# Patient Record
Sex: FEMALE | Race: BLACK OR AFRICAN AMERICAN | NOT HISPANIC OR LATINO | Employment: OTHER | URBAN - METROPOLITAN AREA
[De-identification: names, ages, dates, MRNs, and addresses within clinical notes are randomized per-mention and may not be internally consistent; named-entity substitution may affect disease eponyms.]

---

## 2019-02-15 ENCOUNTER — LAB REQUISITION (OUTPATIENT)
Dept: LAB | Facility: HOSPITAL | Age: 74
End: 2019-02-15

## 2019-02-15 DIAGNOSIS — Z00.00 ROUTINE GENERAL MEDICAL EXAMINATION AT A HEALTH CARE FACILITY: ICD-10-CM

## 2019-02-15 LAB
AMORPH URATE CRY URNS QL MICRO: ABNORMAL /HPF
ANION GAP SERPL CALCULATED.3IONS-SCNC: 2 MMOL/L (ref 3–11)
BACTERIA UR QL AUTO: ABNORMAL /HPF
BASOPHILS # BLD AUTO: 0.02 10*3/MM3 (ref 0–0.2)
BASOPHILS NFR BLD AUTO: 0.2 % (ref 0–1)
BILIRUB UR QL STRIP: NEGATIVE
BUN BLD-MCNC: 16 MG/DL (ref 9–23)
BUN/CREAT SERPL: 18.2 (ref 7–25)
CALCIUM SPEC-SCNC: 9.3 MG/DL (ref 8.7–10.4)
CHLORIDE SERPL-SCNC: 104 MMOL/L (ref 99–109)
CLARITY UR: ABNORMAL
CO2 SERPL-SCNC: 31 MMOL/L (ref 20–31)
COLOR UR: YELLOW
CREAT BLD-MCNC: 0.88 MG/DL (ref 0.6–1.3)
DEPRECATED RDW RBC AUTO: 56.9 FL (ref 37–54)
EOSINOPHIL # BLD AUTO: 0.5 10*3/MM3 (ref 0–0.3)
EOSINOPHIL NFR BLD AUTO: 5.1 % (ref 0–3)
ERYTHROCYTE [DISTWIDTH] IN BLOOD BY AUTOMATED COUNT: 15.7 % (ref 11.3–14.5)
GFR SERPL CREATININE-BSD FRML MDRD: 62 ML/MIN/1.73
GFR SERPL CREATININE-BSD FRML MDRD: 76 ML/MIN/1.73
GLUCOSE BLD-MCNC: 87 MG/DL (ref 70–100)
GLUCOSE UR STRIP-MCNC: NEGATIVE MG/DL
HCT VFR BLD AUTO: 40.8 % (ref 34.5–44)
HGB BLD-MCNC: 12.8 G/DL (ref 11.5–15.5)
HGB UR QL STRIP.AUTO: ABNORMAL
HYALINE CASTS UR QL AUTO: ABNORMAL /LPF
IMM GRANULOCYTES # BLD AUTO: 0.07 10*3/MM3 (ref 0–0.05)
IMM GRANULOCYTES NFR BLD AUTO: 0.7 % (ref 0–0.6)
KETONES UR QL STRIP: NEGATIVE
LEUKOCYTE ESTERASE UR QL STRIP.AUTO: ABNORMAL
LYMPHOCYTES # BLD AUTO: 3.31 10*3/MM3 (ref 0.6–4.8)
LYMPHOCYTES NFR BLD AUTO: 33.8 % (ref 24–44)
MCH RBC QN AUTO: 30.9 PG (ref 27–31)
MCHC RBC AUTO-ENTMCNC: 31.4 G/DL (ref 32–36)
MCV RBC AUTO: 98.6 FL (ref 80–99)
MONOCYTES # BLD AUTO: 0.74 10*3/MM3 (ref 0–1)
MONOCYTES NFR BLD AUTO: 7.6 % (ref 0–12)
NEUTROPHILS # BLD AUTO: 5.22 10*3/MM3 (ref 1.5–8.3)
NEUTROPHILS NFR BLD AUTO: 53.3 % (ref 41–71)
NITRITE UR QL STRIP: POSITIVE
PH UR STRIP.AUTO: <=5 [PH] (ref 5–8)
PLATELET # BLD AUTO: 209 10*3/MM3 (ref 150–450)
PMV BLD AUTO: 11.1 FL (ref 6–12)
POTASSIUM BLD-SCNC: 4.9 MMOL/L (ref 3.5–5.5)
PROT UR QL STRIP: NEGATIVE
RBC # BLD AUTO: 4.14 10*6/MM3 (ref 3.89–5.14)
RBC # UR: ABNORMAL /HPF
REF LAB TEST METHOD: ABNORMAL
SODIUM BLD-SCNC: 137 MMOL/L (ref 132–146)
SP GR UR STRIP: 1.02 (ref 1–1.03)
SQUAMOUS #/AREA URNS HPF: ABNORMAL /HPF
UROBILINOGEN UR QL STRIP: ABNORMAL
WBC NRBC COR # BLD: 9.79 10*3/MM3 (ref 3.5–10.8)
WBC UR QL AUTO: ABNORMAL /HPF

## 2019-02-15 PROCEDURE — 81001 URINALYSIS AUTO W/SCOPE: CPT

## 2019-02-15 PROCEDURE — 85025 COMPLETE CBC W/AUTO DIFF WBC: CPT

## 2019-02-15 PROCEDURE — 80048 BASIC METABOLIC PNL TOTAL CA: CPT

## 2019-02-17 ENCOUNTER — LAB REQUISITION (OUTPATIENT)
Dept: LAB | Facility: HOSPITAL | Age: 74
End: 2019-02-17

## 2019-02-17 DIAGNOSIS — Z00.00 ROUTINE GENERAL MEDICAL EXAMINATION AT A HEALTH CARE FACILITY: ICD-10-CM

## 2019-02-17 LAB
BACTERIA UR QL AUTO: ABNORMAL /HPF
BILIRUB UR QL STRIP: NEGATIVE
CLARITY UR: ABNORMAL
COLOR UR: YELLOW
GLUCOSE UR STRIP-MCNC: NEGATIVE MG/DL
HGB UR QL STRIP.AUTO: ABNORMAL
HYALINE CASTS UR QL AUTO: ABNORMAL /LPF
KETONES UR QL STRIP: NEGATIVE
LEUKOCYTE ESTERASE UR QL STRIP.AUTO: ABNORMAL
MUCOUS THREADS URNS QL MICRO: ABNORMAL /HPF
NITRITE UR QL STRIP: NEGATIVE
PH UR STRIP.AUTO: <=5 [PH] (ref 5–8)
PROT UR QL STRIP: NEGATIVE
RBC # UR: ABNORMAL /HPF
REF LAB TEST METHOD: ABNORMAL
SP GR UR STRIP: 1.02 (ref 1–1.03)
SQUAMOUS #/AREA URNS HPF: ABNORMAL /HPF
UROBILINOGEN UR QL STRIP: ABNORMAL
WBC CLUMPS # UR AUTO: ABNORMAL /HPF
WBC UR QL AUTO: ABNORMAL /HPF

## 2019-02-17 PROCEDURE — 81001 URINALYSIS AUTO W/SCOPE: CPT

## 2020-03-15 ENCOUNTER — LAB REQUISITION (OUTPATIENT)
Dept: LAB | Facility: HOSPITAL | Age: 75
End: 2020-03-15

## 2020-03-15 DIAGNOSIS — Z00.00 ROUTINE GENERAL MEDICAL EXAMINATION AT A HEALTH CARE FACILITY: ICD-10-CM

## 2020-03-15 LAB
ANION GAP SERPL CALCULATED.3IONS-SCNC: 10 MMOL/L (ref 5–15)
BASOPHILS # BLD AUTO: 0.04 10*3/MM3 (ref 0–0.2)
BASOPHILS NFR BLD AUTO: 0.2 % (ref 0–1.5)
BUN BLD-MCNC: 12 MG/DL (ref 8–23)
BUN/CREAT SERPL: 16.7 (ref 7–25)
CALCIUM SPEC-SCNC: 8.8 MG/DL (ref 8.6–10.5)
CHLORIDE SERPL-SCNC: 102 MMOL/L (ref 98–107)
CO2 SERPL-SCNC: 29 MMOL/L (ref 22–29)
CREAT BLD-MCNC: 0.72 MG/DL (ref 0.57–1)
DEPRECATED RDW RBC AUTO: 58.5 FL (ref 37–54)
EOSINOPHIL # BLD AUTO: 0.31 10*3/MM3 (ref 0–0.4)
EOSINOPHIL NFR BLD AUTO: 1.9 % (ref 0.3–6.2)
ERYTHROCYTE [DISTWIDTH] IN BLOOD BY AUTOMATED COUNT: 16.5 % (ref 12.3–15.4)
GFR SERPL CREATININE-BSD FRML MDRD: 79 ML/MIN/1.73
GFR SERPL CREATININE-BSD FRML MDRD: 95 ML/MIN/1.73
GLUCOSE BLD-MCNC: 64 MG/DL (ref 65–99)
HCT VFR BLD AUTO: 39 % (ref 34–46.6)
HGB BLD-MCNC: 12.3 G/DL (ref 12–15.9)
IMM GRANULOCYTES # BLD AUTO: 0.08 10*3/MM3 (ref 0–0.05)
IMM GRANULOCYTES NFR BLD AUTO: 0.5 % (ref 0–0.5)
LYMPHOCYTES # BLD AUTO: 4.06 10*3/MM3 (ref 0.7–3.1)
LYMPHOCYTES NFR BLD AUTO: 24.8 % (ref 19.6–45.3)
MCH RBC QN AUTO: 30.3 PG (ref 26.6–33)
MCHC RBC AUTO-ENTMCNC: 31.5 G/DL (ref 31.5–35.7)
MCV RBC AUTO: 96.1 FL (ref 79–97)
MONOCYTES # BLD AUTO: 0.97 10*3/MM3 (ref 0.1–0.9)
MONOCYTES NFR BLD AUTO: 5.9 % (ref 5–12)
NEUTROPHILS # BLD AUTO: 10.92 10*3/MM3 (ref 1.7–7)
NEUTROPHILS NFR BLD AUTO: 66.7 % (ref 42.7–76)
NRBC BLD AUTO-RTO: 0 /100 WBC (ref 0–0.2)
PLATELET # BLD AUTO: 200 10*3/MM3 (ref 140–450)
PMV BLD AUTO: 10.3 FL (ref 6–12)
POTASSIUM BLD-SCNC: 4.1 MMOL/L (ref 3.5–5.2)
RBC # BLD AUTO: 4.06 10*6/MM3 (ref 3.77–5.28)
SODIUM BLD-SCNC: 141 MMOL/L (ref 136–145)
WBC NRBC COR # BLD: 16.38 10*3/MM3 (ref 3.4–10.8)

## 2020-03-15 PROCEDURE — 85025 COMPLETE CBC W/AUTO DIFF WBC: CPT

## 2020-03-15 PROCEDURE — 80048 BASIC METABOLIC PNL TOTAL CA: CPT

## 2022-04-03 ENCOUNTER — HOSPITAL ENCOUNTER (EMERGENCY)
Facility: HOSPITAL | Age: 77
Discharge: NURSING, REHAB OR LONG-TERM CARE | End: 2022-04-04
Attending: EMERGENCY MEDICINE | Admitting: EMERGENCY MEDICINE

## 2022-04-03 ENCOUNTER — APPOINTMENT (OUTPATIENT)
Dept: GENERAL RADIOLOGY | Facility: HOSPITAL | Age: 77
End: 2022-04-03

## 2022-04-03 DIAGNOSIS — R50.9 FEVER AND CHILLS: Primary | ICD-10-CM

## 2022-04-03 PROCEDURE — 71045 X-RAY EXAM CHEST 1 VIEW: CPT

## 2022-04-03 PROCEDURE — 99285 EMERGENCY DEPT VISIT HI MDM: CPT

## 2022-04-03 PROCEDURE — 93005 ELECTROCARDIOGRAM TRACING: CPT | Performed by: EMERGENCY MEDICINE

## 2022-04-03 RX ORDER — SODIUM CHLORIDE 0.9 % (FLUSH) 0.9 %
10 SYRINGE (ML) INJECTION AS NEEDED
Status: DISCONTINUED | OUTPATIENT
Start: 2022-04-03 | End: 2022-04-04 | Stop reason: HOSPADM

## 2022-04-04 VITALS
TEMPERATURE: 99.1 F | WEIGHT: 200 LBS | SYSTOLIC BLOOD PRESSURE: 112 MMHG | DIASTOLIC BLOOD PRESSURE: 65 MMHG | HEIGHT: 65 IN | BODY MASS INDEX: 33.32 KG/M2 | HEART RATE: 63 BPM | RESPIRATION RATE: 16 BRPM | OXYGEN SATURATION: 99 %

## 2022-04-04 LAB
ALBUMIN SERPL-MCNC: 2.4 G/DL (ref 3.5–5.2)
ALBUMIN/GLOB SERPL: 0.5 G/DL
ALP SERPL-CCNC: 81 U/L (ref 39–117)
ALT SERPL W P-5'-P-CCNC: 6 U/L (ref 1–33)
ANION GAP SERPL CALCULATED.3IONS-SCNC: 8 MMOL/L (ref 5–15)
AST SERPL-CCNC: 16 U/L (ref 1–32)
BASOPHILS # BLD AUTO: 0.04 10*3/MM3 (ref 0–0.2)
BASOPHILS NFR BLD AUTO: 0.4 % (ref 0–1.5)
BILIRUB SERPL-MCNC: 0.3 MG/DL (ref 0–1.2)
BUN SERPL-MCNC: 10 MG/DL (ref 8–23)
BUN/CREAT SERPL: 11 (ref 7–25)
CALCIUM SPEC-SCNC: 8.4 MG/DL (ref 8.6–10.5)
CHLORIDE SERPL-SCNC: 103 MMOL/L (ref 98–107)
CLUMPED PLATELETS: PRESENT
CO2 SERPL-SCNC: 27 MMOL/L (ref 22–29)
CREAT SERPL-MCNC: 0.91 MG/DL (ref 0.57–1)
D-LACTATE SERPL-SCNC: 1.9 MMOL/L (ref 0.5–2)
DEPRECATED RDW RBC AUTO: 59.9 FL (ref 37–54)
EGFRCR SERPLBLD CKD-EPI 2021: 64.3 ML/MIN/1.73
EOSINOPHIL # BLD AUTO: 0.13 10*3/MM3 (ref 0–0.4)
EOSINOPHIL NFR BLD AUTO: 1.4 % (ref 0.3–6.2)
ERYTHROCYTE [DISTWIDTH] IN BLOOD BY AUTOMATED COUNT: 17.9 % (ref 12.3–15.4)
FLUAV RNA RESP QL NAA+PROBE: NOT DETECTED
FLUBV RNA RESP QL NAA+PROBE: NOT DETECTED
GLOBULIN UR ELPH-MCNC: 4.5 GM/DL
GLUCOSE SERPL-MCNC: 95 MG/DL (ref 65–99)
HCT VFR BLD AUTO: 29.9 % (ref 34–46.6)
HGB BLD-MCNC: 9 G/DL (ref 12–15.9)
HOLD SPECIMEN: NORMAL
IMM GRANULOCYTES # BLD AUTO: 0.05 10*3/MM3 (ref 0–0.05)
IMM GRANULOCYTES NFR BLD AUTO: 0.6 % (ref 0–0.5)
LYMPHOCYTES # BLD AUTO: 1.93 10*3/MM3 (ref 0.7–3.1)
LYMPHOCYTES NFR BLD AUTO: 21.3 % (ref 19.6–45.3)
MAGNESIUM SERPL-MCNC: 1.8 MG/DL (ref 1.6–2.4)
MCH RBC QN AUTO: 27.4 PG (ref 26.6–33)
MCHC RBC AUTO-ENTMCNC: 30.1 G/DL (ref 31.5–35.7)
MCV RBC AUTO: 91.2 FL (ref 79–97)
MONOCYTES # BLD AUTO: 1.02 10*3/MM3 (ref 0.1–0.9)
MONOCYTES NFR BLD AUTO: 11.2 % (ref 5–12)
NEUTROPHILS NFR BLD AUTO: 5.9 10*3/MM3 (ref 1.7–7)
NEUTROPHILS NFR BLD AUTO: 65.1 % (ref 42.7–76)
NRBC BLD AUTO-RTO: 0 /100 WBC (ref 0–0.2)
PLATELET # BLD AUTO: 240 10*3/MM3 (ref 140–450)
PMV BLD AUTO: 9.7 FL (ref 6–12)
POTASSIUM SERPL-SCNC: 4.8 MMOL/L (ref 3.5–5.2)
PROT SERPL-MCNC: 6.9 G/DL (ref 6–8.5)
RBC # BLD AUTO: 3.28 10*6/MM3 (ref 3.77–5.28)
RBC MORPH BLD: NORMAL
SARS-COV-2 RNA RESP QL NAA+PROBE: NOT DETECTED
SODIUM SERPL-SCNC: 138 MMOL/L (ref 136–145)
TROPONIN T SERPL-MCNC: 0.03 NG/ML (ref 0–0.03)
WBC MORPH BLD: NORMAL
WBC NRBC COR # BLD: 9.07 10*3/MM3 (ref 3.4–10.8)
WHOLE BLOOD HOLD SPECIMEN: NORMAL
WHOLE BLOOD HOLD SPECIMEN: NORMAL

## 2022-04-04 PROCEDURE — 36415 COLL VENOUS BLD VENIPUNCTURE: CPT

## 2022-04-04 PROCEDURE — 87147 CULTURE TYPE IMMUNOLOGIC: CPT | Performed by: EMERGENCY MEDICINE

## 2022-04-04 PROCEDURE — 87185 SC STD ENZYME DETCJ PER NZM: CPT | Performed by: EMERGENCY MEDICINE

## 2022-04-04 PROCEDURE — 87040 BLOOD CULTURE FOR BACTERIA: CPT | Performed by: EMERGENCY MEDICINE

## 2022-04-04 PROCEDURE — 87150 DNA/RNA AMPLIFIED PROBE: CPT | Performed by: EMERGENCY MEDICINE

## 2022-04-04 PROCEDURE — 85025 COMPLETE CBC W/AUTO DIFF WBC: CPT | Performed by: EMERGENCY MEDICINE

## 2022-04-04 PROCEDURE — 84484 ASSAY OF TROPONIN QUANT: CPT | Performed by: EMERGENCY MEDICINE

## 2022-04-04 PROCEDURE — 85007 BL SMEAR W/DIFF WBC COUNT: CPT | Performed by: EMERGENCY MEDICINE

## 2022-04-04 PROCEDURE — 83735 ASSAY OF MAGNESIUM: CPT | Performed by: EMERGENCY MEDICINE

## 2022-04-04 PROCEDURE — 87077 CULTURE AEROBIC IDENTIFY: CPT | Performed by: EMERGENCY MEDICINE

## 2022-04-04 PROCEDURE — 80053 COMPREHEN METABOLIC PANEL: CPT | Performed by: EMERGENCY MEDICINE

## 2022-04-04 PROCEDURE — 83605 ASSAY OF LACTIC ACID: CPT | Performed by: EMERGENCY MEDICINE

## 2022-04-04 PROCEDURE — 96365 THER/PROPH/DIAG IV INF INIT: CPT

## 2022-04-04 PROCEDURE — 87636 SARSCOV2 & INF A&B AMP PRB: CPT | Performed by: EMERGENCY MEDICINE

## 2022-04-04 PROCEDURE — 25010000002 CEFTRIAXONE PER 250 MG: Performed by: EMERGENCY MEDICINE

## 2022-04-04 PROCEDURE — 87186 SC STD MICRODIL/AGAR DIL: CPT | Performed by: EMERGENCY MEDICINE

## 2022-04-04 PROCEDURE — 87076 CULTURE ANAEROBE IDENT EACH: CPT | Performed by: EMERGENCY MEDICINE

## 2022-04-04 RX ORDER — ACETAMINOPHEN 500 MG
1000 TABLET ORAL ONCE
Status: DISCONTINUED | OUTPATIENT
Start: 2022-04-04 | End: 2022-04-04

## 2022-04-04 RX ORDER — ACETAMINOPHEN 650 MG/1
650 SUPPOSITORY RECTAL ONCE
Status: COMPLETED | OUTPATIENT
Start: 2022-04-04 | End: 2022-04-04

## 2022-04-04 RX ORDER — CEFDINIR 250 MG/5ML
250 POWDER, FOR SUSPENSION ORAL 2 TIMES DAILY
Qty: 70 ML | Refills: 0 | Status: SHIPPED | OUTPATIENT
Start: 2022-04-04 | End: 2022-04-08 | Stop reason: HOSPADM

## 2022-04-04 RX ADMIN — SODIUM CHLORIDE 1000 ML: 9 INJECTION, SOLUTION INTRAVENOUS at 01:41

## 2022-04-04 RX ADMIN — ACETAMINOPHEN 650 MG: 650 SUPPOSITORY RECTAL at 00:49

## 2022-04-04 RX ADMIN — SODIUM CHLORIDE 2 G: 900 INJECTION INTRAVENOUS at 01:41

## 2022-04-05 ENCOUNTER — HOSPITAL ENCOUNTER (INPATIENT)
Facility: HOSPITAL | Age: 77
LOS: 2 days | Discharge: NURSING, REHAB OR LONG-TERM CARE | End: 2022-04-08
Attending: EMERGENCY MEDICINE | Admitting: INTERNAL MEDICINE

## 2022-04-05 ENCOUNTER — APPOINTMENT (OUTPATIENT)
Dept: CT IMAGING | Facility: HOSPITAL | Age: 77
End: 2022-04-05

## 2022-04-05 ENCOUNTER — APPOINTMENT (OUTPATIENT)
Dept: GENERAL RADIOLOGY | Facility: HOSPITAL | Age: 77
End: 2022-04-05

## 2022-04-05 ENCOUNTER — TELEPHONE (OUTPATIENT)
Dept: EMERGENCY DEPT | Facility: HOSPITAL | Age: 77
End: 2022-04-05

## 2022-04-05 DIAGNOSIS — R78.81 POSITIVE BLOOD CULTURE: Primary | ICD-10-CM

## 2022-04-05 DIAGNOSIS — N30.90 CYSTITIS: ICD-10-CM

## 2022-04-05 DIAGNOSIS — R13.11 ORAL PHASE DYSPHAGIA: ICD-10-CM

## 2022-04-05 DIAGNOSIS — R93.89 ABNORMAL CHEST X-RAY: ICD-10-CM

## 2022-04-05 PROBLEM — E78.5 HYPERLIPIDEMIA: Status: ACTIVE | Noted: 2022-04-05

## 2022-04-05 PROBLEM — I10 ESSENTIAL HYPERTENSION: Status: ACTIVE | Noted: 2022-04-05

## 2022-04-05 PROBLEM — D64.9 ANEMIA: Status: ACTIVE | Noted: 2022-04-05

## 2022-04-05 PROBLEM — F03.90 DEMENTIA (HCC): Status: ACTIVE | Noted: 2022-04-05

## 2022-04-05 PROBLEM — N39.0 ACUTE UTI (URINARY TRACT INFECTION): Status: ACTIVE | Noted: 2022-04-05

## 2022-04-05 PROBLEM — E83.51 HYPOCALCEMIA: Status: ACTIVE | Noted: 2022-04-05

## 2022-04-05 PROBLEM — S91.302A WOUND OF LEFT FOOT: Status: ACTIVE | Noted: 2022-04-05

## 2022-04-05 PROBLEM — J18.9 CAP (COMMUNITY ACQUIRED PNEUMONIA): Status: ACTIVE | Noted: 2022-04-05

## 2022-04-05 PROBLEM — E03.9 HYPOTHYROIDISM: Status: ACTIVE | Noted: 2022-04-05

## 2022-04-05 LAB
ALBUMIN SERPL-MCNC: 2.5 G/DL (ref 3.5–5.2)
ALBUMIN/GLOB SERPL: 0.6 G/DL
ALP SERPL-CCNC: 79 U/L (ref 39–117)
ALT SERPL W P-5'-P-CCNC: 5 U/L (ref 1–33)
ANION GAP SERPL CALCULATED.3IONS-SCNC: 6 MMOL/L (ref 5–15)
AST SERPL-CCNC: 13 U/L (ref 1–32)
BACTERIA BLD CULT: ABNORMAL
BACTERIA UR QL AUTO: ABNORMAL /HPF
BASOPHILS # BLD AUTO: 0.03 10*3/MM3 (ref 0–0.2)
BASOPHILS NFR BLD AUTO: 0.4 % (ref 0–1.5)
BILIRUB SERPL-MCNC: 0.2 MG/DL (ref 0–1.2)
BILIRUB UR QL STRIP: NEGATIVE
BUN SERPL-MCNC: 11 MG/DL (ref 8–23)
BUN/CREAT SERPL: 11.6 (ref 7–25)
CALCIUM SPEC-SCNC: 8.1 MG/DL (ref 8.6–10.5)
CHLORIDE SERPL-SCNC: 101 MMOL/L (ref 98–107)
CLARITY UR: ABNORMAL
CO2 SERPL-SCNC: 28 MMOL/L (ref 22–29)
COLOR UR: YELLOW
CREAT SERPL-MCNC: 0.95 MG/DL (ref 0.57–1)
D-LACTATE SERPL-SCNC: 1.6 MMOL/L (ref 0.5–2)
DEPRECATED RDW RBC AUTO: 61.4 FL (ref 37–54)
EGFRCR SERPLBLD CKD-EPI 2021: 61.1 ML/MIN/1.73
EOSINOPHIL # BLD AUTO: 0.73 10*3/MM3 (ref 0–0.4)
EOSINOPHIL NFR BLD AUTO: 10.9 % (ref 0.3–6.2)
ERYTHROCYTE [DISTWIDTH] IN BLOOD BY AUTOMATED COUNT: 17.7 % (ref 12.3–15.4)
FLUAV SUBTYP SPEC NAA+PROBE: NOT DETECTED
FLUBV RNA ISLT QL NAA+PROBE: NOT DETECTED
GLOBULIN UR ELPH-MCNC: 4.4 GM/DL
GLUCOSE SERPL-MCNC: 108 MG/DL (ref 65–99)
GLUCOSE UR STRIP-MCNC: NEGATIVE MG/DL
HCT VFR BLD AUTO: 29.2 % (ref 34–46.6)
HGB BLD-MCNC: 8.7 G/DL (ref 12–15.9)
HGB UR QL STRIP.AUTO: ABNORMAL
HOLD SPECIMEN: NORMAL
HYALINE CASTS UR QL AUTO: ABNORMAL /LPF
IMM GRANULOCYTES # BLD AUTO: 0.02 10*3/MM3 (ref 0–0.05)
IMM GRANULOCYTES NFR BLD AUTO: 0.3 % (ref 0–0.5)
INR PPP: 1.14 (ref 0.84–1.13)
KETONES UR QL STRIP: NEGATIVE
LEUKOCYTE ESTERASE UR QL STRIP.AUTO: ABNORMAL
LYMPHOCYTES # BLD AUTO: 2.98 10*3/MM3 (ref 0.7–3.1)
LYMPHOCYTES NFR BLD AUTO: 44.7 % (ref 19.6–45.3)
MCH RBC QN AUTO: 27.9 PG (ref 26.6–33)
MCHC RBC AUTO-ENTMCNC: 29.8 G/DL (ref 31.5–35.7)
MCV RBC AUTO: 93.6 FL (ref 79–97)
MONOCYTES # BLD AUTO: 0.7 10*3/MM3 (ref 0.1–0.9)
MONOCYTES NFR BLD AUTO: 10.5 % (ref 5–12)
NEUTROPHILS NFR BLD AUTO: 2.21 10*3/MM3 (ref 1.7–7)
NEUTROPHILS NFR BLD AUTO: 33.2 % (ref 42.7–76)
NITRITE UR QL STRIP: NEGATIVE
NRBC BLD AUTO-RTO: 0 /100 WBC (ref 0–0.2)
PH UR STRIP.AUTO: 5.5 [PH] (ref 5–8)
PLATELET # BLD AUTO: 212 10*3/MM3 (ref 140–450)
PMV BLD AUTO: 9.2 FL (ref 6–12)
POTASSIUM SERPL-SCNC: 4.2 MMOL/L (ref 3.5–5.2)
PROCALCITONIN SERPL-MCNC: <0.2 NG/ML (ref 0–0.25)
PROT SERPL-MCNC: 6.9 G/DL (ref 6–8.5)
PROT UR QL STRIP: ABNORMAL
PROTHROMBIN TIME: 14.6 SECONDS (ref 11.4–14.4)
RBC # BLD AUTO: 3.12 10*6/MM3 (ref 3.77–5.28)
RBC # UR STRIP: ABNORMAL /HPF
REF LAB TEST METHOD: ABNORMAL
SARS-COV-2 RNA PNL SPEC NAA+PROBE: NOT DETECTED
SODIUM SERPL-SCNC: 135 MMOL/L (ref 136–145)
SP GR UR STRIP: 1.02 (ref 1–1.03)
SQUAMOUS #/AREA URNS HPF: ABNORMAL /HPF
UROBILINOGEN UR QL STRIP: ABNORMAL
WBC # UR STRIP: ABNORMAL /HPF
WBC NRBC COR # BLD: 6.67 10*3/MM3 (ref 3.4–10.8)
WHOLE BLOOD HOLD SPECIMEN: NORMAL
WHOLE BLOOD HOLD SPECIMEN: NORMAL

## 2022-04-05 PROCEDURE — 99284 EMERGENCY DEPT VISIT MOD MDM: CPT

## 2022-04-05 PROCEDURE — 82728 ASSAY OF FERRITIN: CPT | Performed by: PHYSICIAN ASSISTANT

## 2022-04-05 PROCEDURE — 25010000002 CEFTRIAXONE PER 250 MG: Performed by: EMERGENCY MEDICINE

## 2022-04-05 PROCEDURE — 81001 URINALYSIS AUTO W/SCOPE: CPT | Performed by: EMERGENCY MEDICINE

## 2022-04-05 PROCEDURE — 85045 AUTOMATED RETICULOCYTE COUNT: CPT | Performed by: PHYSICIAN ASSISTANT

## 2022-04-05 PROCEDURE — 99223 1ST HOSP IP/OBS HIGH 75: CPT | Performed by: INTERNAL MEDICINE

## 2022-04-05 PROCEDURE — 83605 ASSAY OF LACTIC ACID: CPT | Performed by: EMERGENCY MEDICINE

## 2022-04-05 PROCEDURE — 85610 PROTHROMBIN TIME: CPT | Performed by: EMERGENCY MEDICINE

## 2022-04-05 PROCEDURE — G0378 HOSPITAL OBSERVATION PER HR: HCPCS

## 2022-04-05 PROCEDURE — 71260 CT THORAX DX C+: CPT

## 2022-04-05 PROCEDURE — 84439 ASSAY OF FREE THYROXINE: CPT | Performed by: PHYSICIAN ASSISTANT

## 2022-04-05 PROCEDURE — 87040 BLOOD CULTURE FOR BACTERIA: CPT | Performed by: EMERGENCY MEDICINE

## 2022-04-05 PROCEDURE — 84466 ASSAY OF TRANSFERRIN: CPT | Performed by: PHYSICIAN ASSISTANT

## 2022-04-05 PROCEDURE — 74177 CT ABD & PELVIS W/CONTRAST: CPT

## 2022-04-05 PROCEDURE — 25010000002 VANCOMYCIN 10 G RECONSTITUTED SOLUTION: Performed by: EMERGENCY MEDICINE

## 2022-04-05 PROCEDURE — 82607 VITAMIN B-12: CPT | Performed by: PHYSICIAN ASSISTANT

## 2022-04-05 PROCEDURE — 82746 ASSAY OF FOLIC ACID SERUM: CPT | Performed by: PHYSICIAN ASSISTANT

## 2022-04-05 PROCEDURE — 85025 COMPLETE CBC W/AUTO DIFF WBC: CPT | Performed by: EMERGENCY MEDICINE

## 2022-04-05 PROCEDURE — 80053 COMPREHEN METABOLIC PANEL: CPT | Performed by: EMERGENCY MEDICINE

## 2022-04-05 PROCEDURE — 25010000002 IOPAMIDOL 61 % SOLUTION: Performed by: INTERNAL MEDICINE

## 2022-04-05 PROCEDURE — 87636 SARSCOV2 & INF A&B AMP PRB: CPT | Performed by: EMERGENCY MEDICINE

## 2022-04-05 PROCEDURE — 84443 ASSAY THYROID STIM HORMONE: CPT | Performed by: PHYSICIAN ASSISTANT

## 2022-04-05 PROCEDURE — P9612 CATHETERIZE FOR URINE SPEC: HCPCS

## 2022-04-05 PROCEDURE — 83036 HEMOGLOBIN GLYCOSYLATED A1C: CPT | Performed by: PHYSICIAN ASSISTANT

## 2022-04-05 PROCEDURE — 71045 X-RAY EXAM CHEST 1 VIEW: CPT

## 2022-04-05 PROCEDURE — 83970 ASSAY OF PARATHORMONE: CPT | Performed by: PHYSICIAN ASSISTANT

## 2022-04-05 PROCEDURE — 84145 PROCALCITONIN (PCT): CPT | Performed by: EMERGENCY MEDICINE

## 2022-04-05 PROCEDURE — 83540 ASSAY OF IRON: CPT | Performed by: PHYSICIAN ASSISTANT

## 2022-04-05 PROCEDURE — 87086 URINE CULTURE/COLONY COUNT: CPT | Performed by: EMERGENCY MEDICINE

## 2022-04-05 RX ORDER — ACETAMINOPHEN 500 MG
500 TABLET ORAL EVERY 6 HOURS PRN
COMMUNITY

## 2022-04-05 RX ORDER — ASPIRIN 81 MG/1
81 TABLET, CHEWABLE ORAL EVERY MORNING
COMMUNITY

## 2022-04-05 RX ORDER — ERGOCALCIFEROL (VITAMIN D2) 200 MCG/ML
DROPS ORAL DAILY
COMMUNITY
End: 2022-07-17

## 2022-04-05 RX ORDER — MIRTAZAPINE 7.5 MG/1
7.5 TABLET, FILM COATED ORAL NIGHTLY
COMMUNITY

## 2022-04-05 RX ORDER — DOCUSATE SODIUM 100 MG/1
100 CAPSULE, LIQUID FILLED ORAL 2 TIMES DAILY
COMMUNITY

## 2022-04-05 RX ORDER — RISPERIDONE 0.25 MG/1
0.25 TABLET ORAL 2 TIMES DAILY
COMMUNITY

## 2022-04-05 RX ORDER — SULFAMETHOXAZOLE AND TRIMETHOPRIM 800; 160 MG/1; MG/1
TABLET ORAL 2 TIMES DAILY
COMMUNITY
End: 2022-04-08 | Stop reason: HOSPADM

## 2022-04-05 RX ORDER — ATORVASTATIN CALCIUM 40 MG/1
40 TABLET, FILM COATED ORAL EVERY MORNING
COMMUNITY

## 2022-04-05 RX ORDER — LEVOTHYROXINE SODIUM 0.05 MG/1
50 TABLET ORAL DAILY
COMMUNITY

## 2022-04-05 RX ORDER — NYSTATIN 100000 U/G
1 CREAM TOPICAL 2 TIMES DAILY
COMMUNITY
End: 2022-07-17

## 2022-04-05 RX ORDER — METOPROLOL TARTRATE 50 MG/1
50 TABLET, FILM COATED ORAL 2 TIMES DAILY
COMMUNITY

## 2022-04-05 RX ORDER — TRAMADOL HYDROCHLORIDE 50 MG/1
50 TABLET ORAL EVERY 6 HOURS PRN
COMMUNITY

## 2022-04-05 RX ORDER — SODIUM CHLORIDE 0.9 % (FLUSH) 0.9 %
10 SYRINGE (ML) INJECTION AS NEEDED
Status: DISCONTINUED | OUTPATIENT
Start: 2022-04-05 | End: 2022-04-08 | Stop reason: HOSPADM

## 2022-04-05 RX ADMIN — SODIUM CHLORIDE 2 G: 900 INJECTION INTRAVENOUS at 19:27

## 2022-04-05 RX ADMIN — VANCOMYCIN HYDROCHLORIDE 1750 MG: 10 INJECTION, POWDER, LYOPHILIZED, FOR SOLUTION INTRAVENOUS at 20:32

## 2022-04-05 RX ADMIN — IOPAMIDOL 100 ML: 612 INJECTION, SOLUTION INTRAVENOUS at 22:18

## 2022-04-05 RX ADMIN — SODIUM CHLORIDE 500 ML: 9 INJECTION, SOLUTION INTRAVENOUS at 19:27

## 2022-04-05 NOTE — TELEPHONE ENCOUNTER
I spoke to the nurse Yakelin at [facility] and she advised me the patient is on Augmentin for a UTI and she does not have a fever or cough.  I did advise her of the positive blood culture results.  She will take this information of pass this along to the attending physician there at [facility] otherwise she advised me the patient is doing pretty well but she is aware of my concern for sepsis bacteremia and will pass this information along to the attending physician in charge of the patient.

## 2022-04-06 ENCOUNTER — APPOINTMENT (OUTPATIENT)
Dept: GENERAL RADIOLOGY | Facility: HOSPITAL | Age: 77
End: 2022-04-06

## 2022-04-06 LAB
ANION GAP SERPL CALCULATED.3IONS-SCNC: 8 MMOL/L (ref 5–15)
BACTERIA SPEC AEROBE CULT: NO GROWTH
BASOPHILS # BLD AUTO: 0.04 10*3/MM3 (ref 0–0.2)
BASOPHILS NFR BLD AUTO: 0.6 % (ref 0–1.5)
BUN SERPL-MCNC: 9 MG/DL (ref 8–23)
BUN/CREAT SERPL: 10.8 (ref 7–25)
CA-I SERPL ISE-MCNC: 1.23 MMOL/L (ref 1.12–1.32)
CALCIUM SPEC-SCNC: 8.1 MG/DL (ref 8.6–10.5)
CHLORIDE SERPL-SCNC: 102 MMOL/L (ref 98–107)
CO2 SERPL-SCNC: 24 MMOL/L (ref 22–29)
CREAT SERPL-MCNC: 0.83 MG/DL (ref 0.57–1)
DEPRECATED RDW RBC AUTO: 61.7 FL (ref 37–54)
EGFRCR SERPLBLD CKD-EPI 2021: 71.8 ML/MIN/1.73
EOSINOPHIL # BLD AUTO: 0.47 10*3/MM3 (ref 0–0.4)
EOSINOPHIL NFR BLD AUTO: 7.4 % (ref 0.3–6.2)
ERYTHROCYTE [DISTWIDTH] IN BLOOD BY AUTOMATED COUNT: 17.8 % (ref 12.3–15.4)
FERRITIN SERPL-MCNC: 232 NG/ML (ref 13–150)
FOLATE SERPL-MCNC: 3.23 NG/ML (ref 4.78–24.2)
GLUCOSE SERPL-MCNC: 85 MG/DL (ref 65–99)
HBA1C MFR BLD: 4.9 % (ref 4.8–5.6)
HCT VFR BLD AUTO: 32.1 % (ref 34–46.6)
HGB BLD-MCNC: 9.4 G/DL (ref 12–15.9)
IMM GRANULOCYTES # BLD AUTO: 0.03 10*3/MM3 (ref 0–0.05)
IMM GRANULOCYTES NFR BLD AUTO: 0.5 % (ref 0–0.5)
IRON 24H UR-MRATE: 19 MCG/DL (ref 37–145)
IRON SATN MFR SERPL: 10 % (ref 20–50)
LYMPHOCYTES # BLD AUTO: 2.22 10*3/MM3 (ref 0.7–3.1)
LYMPHOCYTES NFR BLD AUTO: 35 % (ref 19.6–45.3)
MCH RBC QN AUTO: 27.6 PG (ref 26.6–33)
MCHC RBC AUTO-ENTMCNC: 29.3 G/DL (ref 31.5–35.7)
MCV RBC AUTO: 94.1 FL (ref 79–97)
MONOCYTES # BLD AUTO: 0.65 10*3/MM3 (ref 0.1–0.9)
MONOCYTES NFR BLD AUTO: 10.2 % (ref 5–12)
MRSA DNA SPEC QL NAA+PROBE: NEGATIVE
NEUTROPHILS NFR BLD AUTO: 2.94 10*3/MM3 (ref 1.7–7)
NEUTROPHILS NFR BLD AUTO: 46.3 % (ref 42.7–76)
NRBC BLD AUTO-RTO: 0 /100 WBC (ref 0–0.2)
PLATELET # BLD AUTO: 213 10*3/MM3 (ref 140–450)
PMV BLD AUTO: 9 FL (ref 6–12)
POTASSIUM SERPL-SCNC: 4.2 MMOL/L (ref 3.5–5.2)
PTH-INTACT SERPL-MCNC: 36 PG/ML (ref 15–65)
RBC # BLD AUTO: 3.41 10*6/MM3 (ref 3.77–5.28)
RETICS # AUTO: 0.03 10*6/MM3 (ref 0.02–0.13)
RETICS/RBC NFR AUTO: 1.02 % (ref 0.7–1.9)
SODIUM SERPL-SCNC: 134 MMOL/L (ref 136–145)
T4 FREE SERPL-MCNC: 1.06 NG/DL (ref 0.93–1.7)
TIBC SERPL-MCNC: 192 MCG/DL (ref 298–536)
TRANSFERRIN SERPL-MCNC: 129 MG/DL (ref 200–360)
TSH SERPL DL<=0.05 MIU/L-ACNC: 3.15 UIU/ML (ref 0.27–4.2)
TSH SERPL DL<=0.05 MIU/L-ACNC: 4.16 UIU/ML (ref 0.27–4.2)
VIT B12 BLD-MCNC: 565 PG/ML (ref 211–946)
WBC NRBC COR # BLD: 6.35 10*3/MM3 (ref 3.4–10.8)

## 2022-04-06 PROCEDURE — 80048 BASIC METABOLIC PNL TOTAL CA: CPT | Performed by: PHYSICIAN ASSISTANT

## 2022-04-06 PROCEDURE — 82330 ASSAY OF CALCIUM: CPT | Performed by: PHYSICIAN ASSISTANT

## 2022-04-06 PROCEDURE — 25010000002 PIPERACILLIN SOD-TAZOBACTAM PER 1 G: Performed by: PHYSICIAN ASSISTANT

## 2022-04-06 PROCEDURE — 92610 EVALUATE SWALLOWING FUNCTION: CPT

## 2022-04-06 PROCEDURE — 99233 SBSQ HOSP IP/OBS HIGH 50: CPT | Performed by: INTERNAL MEDICINE

## 2022-04-06 PROCEDURE — 85025 COMPLETE CBC W/AUTO DIFF WBC: CPT | Performed by: PHYSICIAN ASSISTANT

## 2022-04-06 PROCEDURE — 74230 X-RAY XM SWLNG FUNCJ C+: CPT

## 2022-04-06 PROCEDURE — 92611 MOTION FLUOROSCOPY/SWALLOW: CPT

## 2022-04-06 PROCEDURE — 87641 MR-STAPH DNA AMP PROBE: CPT

## 2022-04-06 PROCEDURE — 84443 ASSAY THYROID STIM HORMONE: CPT | Performed by: INTERNAL MEDICINE

## 2022-04-06 RX ORDER — POLYETHYLENE GLYCOL 3350 17 G/17G
17 POWDER, FOR SOLUTION ORAL DAILY PRN
Status: DISCONTINUED | OUTPATIENT
Start: 2022-04-06 | End: 2022-04-08 | Stop reason: HOSPADM

## 2022-04-06 RX ORDER — MIRTAZAPINE 15 MG/1
7.5 TABLET, FILM COATED ORAL NIGHTLY
Status: DISCONTINUED | OUTPATIENT
Start: 2022-04-06 | End: 2022-04-08 | Stop reason: HOSPADM

## 2022-04-06 RX ORDER — ACETAMINOPHEN 500 MG
500 TABLET ORAL EVERY 6 HOURS PRN
Status: DISCONTINUED | OUTPATIENT
Start: 2022-04-06 | End: 2022-04-08 | Stop reason: HOSPADM

## 2022-04-06 RX ORDER — BISACODYL 5 MG/1
5 TABLET, DELAYED RELEASE ORAL DAILY PRN
Status: DISCONTINUED | OUTPATIENT
Start: 2022-04-06 | End: 2022-04-08 | Stop reason: HOSPADM

## 2022-04-06 RX ORDER — ATORVASTATIN CALCIUM 40 MG/1
40 TABLET, FILM COATED ORAL DAILY
Status: DISCONTINUED | OUTPATIENT
Start: 2022-04-06 | End: 2022-04-08 | Stop reason: HOSPADM

## 2022-04-06 RX ORDER — AMOXICILLIN 250 MG
2 CAPSULE ORAL 2 TIMES DAILY
Status: DISCONTINUED | OUTPATIENT
Start: 2022-04-06 | End: 2022-04-08 | Stop reason: HOSPADM

## 2022-04-06 RX ORDER — METOPROLOL TARTRATE 50 MG/1
50 TABLET, FILM COATED ORAL 2 TIMES DAILY
Status: DISCONTINUED | OUTPATIENT
Start: 2022-04-06 | End: 2022-04-07

## 2022-04-06 RX ORDER — ONDANSETRON 2 MG/ML
4 INJECTION INTRAMUSCULAR; INTRAVENOUS EVERY 6 HOURS PRN
Status: DISCONTINUED | OUTPATIENT
Start: 2022-04-06 | End: 2022-04-08 | Stop reason: HOSPADM

## 2022-04-06 RX ORDER — RISPERIDONE 0.25 MG/1
0.25 TABLET ORAL 2 TIMES DAILY
Status: DISCONTINUED | OUTPATIENT
Start: 2022-04-06 | End: 2022-04-08 | Stop reason: HOSPADM

## 2022-04-06 RX ORDER — BISACODYL 10 MG
10 SUPPOSITORY, RECTAL RECTAL DAILY PRN
Status: DISCONTINUED | OUTPATIENT
Start: 2022-04-06 | End: 2022-04-08 | Stop reason: HOSPADM

## 2022-04-06 RX ORDER — CHOLECALCIFEROL (VITAMIN D3) 125 MCG
5 CAPSULE ORAL NIGHTLY PRN
Status: DISCONTINUED | OUTPATIENT
Start: 2022-04-06 | End: 2022-04-08 | Stop reason: HOSPADM

## 2022-04-06 RX ORDER — TRAMADOL HYDROCHLORIDE 50 MG/1
50 TABLET ORAL EVERY 6 HOURS PRN
Status: DISCONTINUED | OUTPATIENT
Start: 2022-04-06 | End: 2022-04-08 | Stop reason: HOSPADM

## 2022-04-06 RX ORDER — SODIUM CHLORIDE 9 MG/ML
50 INJECTION, SOLUTION INTRAVENOUS CONTINUOUS
Status: DISCONTINUED | OUTPATIENT
Start: 2022-04-06 | End: 2022-04-08 | Stop reason: HOSPADM

## 2022-04-06 RX ORDER — ASPIRIN 81 MG/1
81 TABLET, CHEWABLE ORAL DAILY
Status: DISCONTINUED | OUTPATIENT
Start: 2022-04-06 | End: 2022-04-08 | Stop reason: HOSPADM

## 2022-04-06 RX ORDER — LEVOTHYROXINE SODIUM 0.05 MG/1
50 TABLET ORAL
Status: DISCONTINUED | OUTPATIENT
Start: 2022-04-06 | End: 2022-04-08 | Stop reason: HOSPADM

## 2022-04-06 RX ORDER — SODIUM CHLORIDE 0.9 % (FLUSH) 0.9 %
10 SYRINGE (ML) INJECTION EVERY 12 HOURS SCHEDULED
Status: DISCONTINUED | OUTPATIENT
Start: 2022-04-06 | End: 2022-04-08 | Stop reason: HOSPADM

## 2022-04-06 RX ORDER — SODIUM CHLORIDE 0.9 % (FLUSH) 0.9 %
10 SYRINGE (ML) INJECTION AS NEEDED
Status: DISCONTINUED | OUTPATIENT
Start: 2022-04-06 | End: 2022-04-08 | Stop reason: HOSPADM

## 2022-04-06 RX ADMIN — SENNOSIDES AND DOCUSATE SODIUM 2 TABLET: 50; 8.6 TABLET ORAL at 21:31

## 2022-04-06 RX ADMIN — ATORVASTATIN CALCIUM 40 MG: 40 TABLET, FILM COATED ORAL at 09:28

## 2022-04-06 RX ADMIN — MIRTAZAPINE 7.5 MG: 15 TABLET, FILM COATED ORAL at 22:22

## 2022-04-06 RX ADMIN — RISPERIDONE 0.25 MG: 0.25 TABLET ORAL at 21:31

## 2022-04-06 RX ADMIN — Medication 10 ML: at 21:32

## 2022-04-06 RX ADMIN — TAZOBACTAM SODIUM AND PIPERACILLIN SODIUM 3.38 G: 375; 3 INJECTION, SOLUTION INTRAVENOUS at 15:05

## 2022-04-06 RX ADMIN — BARIUM SULFATE 20 ML: 400 PASTE ORAL at 14:01

## 2022-04-06 RX ADMIN — Medication 10 ML: at 13:22

## 2022-04-06 RX ADMIN — METOPROLOL TARTRATE 50 MG: 50 TABLET, FILM COATED ORAL at 09:28

## 2022-04-06 RX ADMIN — MIRTAZAPINE 7.5 MG: 15 TABLET, FILM COATED ORAL at 01:20

## 2022-04-06 RX ADMIN — TAZOBACTAM SODIUM AND PIPERACILLIN SODIUM 3.38 G: 375; 3 INJECTION, SOLUTION INTRAVENOUS at 01:21

## 2022-04-06 RX ADMIN — RISPERIDONE 0.25 MG: 0.25 TABLET ORAL at 09:28

## 2022-04-06 RX ADMIN — SODIUM CHLORIDE 50 ML/HR: 9 INJECTION, SOLUTION INTRAVENOUS at 01:21

## 2022-04-06 RX ADMIN — Medication 10 ML: at 01:21

## 2022-04-06 RX ADMIN — BARIUM SULFATE 100 ML: 0.81 POWDER, FOR SUSPENSION ORAL at 14:01

## 2022-04-06 RX ADMIN — ASPIRIN 81 MG 81 MG: 81 TABLET ORAL at 09:28

## 2022-04-06 RX ADMIN — TAZOBACTAM SODIUM AND PIPERACILLIN SODIUM 3.38 G: 375; 3 INJECTION, SOLUTION INTRAVENOUS at 06:07

## 2022-04-06 RX ADMIN — TAZOBACTAM SODIUM AND PIPERACILLIN SODIUM 3.38 G: 375; 3 INJECTION, SOLUTION INTRAVENOUS at 21:31

## 2022-04-06 RX ADMIN — SENNOSIDES AND DOCUSATE SODIUM 2 TABLET: 50; 8.6 TABLET ORAL at 09:28

## 2022-04-06 RX ADMIN — LEVOTHYROXINE SODIUM 50 MCG: 50 TABLET ORAL at 06:07

## 2022-04-06 RX ADMIN — METOPROLOL TARTRATE 50 MG: 50 TABLET, FILM COATED ORAL at 21:32

## 2022-04-07 ENCOUNTER — TELEPHONE (OUTPATIENT)
Dept: EMERGENCY DEPT | Facility: HOSPITAL | Age: 77
End: 2022-04-07

## 2022-04-07 LAB
ALBUMIN SERPL-MCNC: 2.4 G/DL (ref 3.5–5.2)
ALBUMIN/GLOB SERPL: 0.4 G/DL
ALP SERPL-CCNC: 72 U/L (ref 39–117)
ALT SERPL W P-5'-P-CCNC: 5 U/L (ref 1–33)
ANION GAP SERPL CALCULATED.3IONS-SCNC: 24 MMOL/L (ref 5–15)
AST SERPL-CCNC: 16 U/L (ref 1–32)
BASOPHILS # BLD AUTO: 0.02 10*3/MM3 (ref 0–0.2)
BASOPHILS NFR BLD AUTO: 0.5 % (ref 0–1.5)
BILIRUB SERPL-MCNC: 0.3 MG/DL (ref 0–1.2)
BUN SERPL-MCNC: 5 MG/DL (ref 8–23)
BUN/CREAT SERPL: 7.2 (ref 7–25)
BURR CELLS BLD QL SMEAR: NORMAL
CALCIUM SPEC-SCNC: 8 MG/DL (ref 8.6–10.5)
CHLORIDE SERPL-SCNC: 98 MMOL/L (ref 98–107)
CO2 SERPL-SCNC: 19 MMOL/L (ref 22–29)
CREAT SERPL-MCNC: 0.69 MG/DL (ref 0.57–1)
D-LACTATE SERPL-SCNC: 1.5 MMOL/L (ref 0.5–2)
DEPRECATED RDW RBC AUTO: 60.1 FL (ref 37–54)
EGFRCR SERPLBLD CKD-EPI 2021: 88.4 ML/MIN/1.73
EOSINOPHIL # BLD AUTO: 0.26 10*3/MM3 (ref 0–0.4)
EOSINOPHIL NFR BLD AUTO: 6 % (ref 0.3–6.2)
ERYTHROCYTE [DISTWIDTH] IN BLOOD BY AUTOMATED COUNT: 17.5 % (ref 12.3–15.4)
GLOBULIN UR ELPH-MCNC: 5.7 GM/DL
GLUCOSE SERPL-MCNC: 156 MG/DL (ref 65–99)
HCT VFR BLD AUTO: 32.8 % (ref 34–46.6)
HGB BLD-MCNC: 9.4 G/DL (ref 12–15.9)
IMM GRANULOCYTES # BLD AUTO: 0.01 10*3/MM3 (ref 0–0.05)
IMM GRANULOCYTES NFR BLD AUTO: 0.2 % (ref 0–0.5)
LYMPHOCYTES # BLD AUTO: 1.8 10*3/MM3 (ref 0.7–3.1)
LYMPHOCYTES NFR BLD AUTO: 41.7 % (ref 19.6–45.3)
MCH RBC QN AUTO: 26.5 PG (ref 26.6–33)
MCHC RBC AUTO-ENTMCNC: 28.7 G/DL (ref 31.5–35.7)
MCV RBC AUTO: 92.4 FL (ref 79–97)
MONOCYTES # BLD AUTO: 0.48 10*3/MM3 (ref 0.1–0.9)
MONOCYTES NFR BLD AUTO: 11.1 % (ref 5–12)
NEUTROPHILS NFR BLD AUTO: 1.75 10*3/MM3 (ref 1.7–7)
NEUTROPHILS NFR BLD AUTO: 40.5 % (ref 42.7–76)
NRBC BLD AUTO-RTO: 0 /100 WBC (ref 0–0.2)
PLAT MORPH BLD: NORMAL
PLATELET # BLD AUTO: 220 10*3/MM3 (ref 140–450)
PMV BLD AUTO: 9.3 FL (ref 6–12)
POTASSIUM SERPL-SCNC: 3.6 MMOL/L (ref 3.5–5.2)
PROCALCITONIN SERPL-MCNC: <0.2 NG/ML (ref 0–0.25)
PROT SERPL-MCNC: 8.1 G/DL (ref 6–8.5)
RBC # BLD AUTO: 3.55 10*6/MM3 (ref 3.77–5.28)
SODIUM SERPL-SCNC: 141 MMOL/L (ref 136–145)
WBC MORPH BLD: NORMAL
WBC NRBC COR # BLD: 4.32 10*3/MM3 (ref 3.4–10.8)

## 2022-04-07 PROCEDURE — 83605 ASSAY OF LACTIC ACID: CPT | Performed by: INTERNAL MEDICINE

## 2022-04-07 PROCEDURE — 25010000002 PIPERACILLIN SOD-TAZOBACTAM PER 1 G: Performed by: PHYSICIAN ASSISTANT

## 2022-04-07 PROCEDURE — 87899 AGENT NOS ASSAY W/OPTIC: CPT | Performed by: INTERNAL MEDICINE

## 2022-04-07 PROCEDURE — 85025 COMPLETE CBC W/AUTO DIFF WBC: CPT | Performed by: PHYSICIAN ASSISTANT

## 2022-04-07 PROCEDURE — 85007 BL SMEAR W/DIFF WBC COUNT: CPT | Performed by: PHYSICIAN ASSISTANT

## 2022-04-07 PROCEDURE — 84145 PROCALCITONIN (PCT): CPT | Performed by: INTERNAL MEDICINE

## 2022-04-07 PROCEDURE — 99232 SBSQ HOSP IP/OBS MODERATE 35: CPT | Performed by: INTERNAL MEDICINE

## 2022-04-07 PROCEDURE — 80053 COMPREHEN METABOLIC PANEL: CPT | Performed by: INTERNAL MEDICINE

## 2022-04-07 RX ORDER — METOPROLOL TARTRATE 100 MG/1
100 TABLET ORAL 2 TIMES DAILY
Status: DISCONTINUED | OUTPATIENT
Start: 2022-04-07 | End: 2022-04-08 | Stop reason: HOSPADM

## 2022-04-07 RX ORDER — HYDRALAZINE HYDROCHLORIDE 20 MG/ML
10 INJECTION INTRAMUSCULAR; INTRAVENOUS EVERY 6 HOURS PRN
Status: DISCONTINUED | OUTPATIENT
Start: 2022-04-07 | End: 2022-04-08 | Stop reason: HOSPADM

## 2022-04-07 RX ORDER — FOLIC ACID 1 MG/1
1 TABLET ORAL DAILY
Status: DISCONTINUED | OUTPATIENT
Start: 2022-04-07 | End: 2022-04-08 | Stop reason: HOSPADM

## 2022-04-07 RX ADMIN — RISPERIDONE 0.25 MG: 0.25 TABLET ORAL at 21:55

## 2022-04-07 RX ADMIN — TAZOBACTAM SODIUM AND PIPERACILLIN SODIUM 3.38 G: 375; 3 INJECTION, SOLUTION INTRAVENOUS at 21:55

## 2022-04-07 RX ADMIN — TAZOBACTAM SODIUM AND PIPERACILLIN SODIUM 3.38 G: 375; 3 INJECTION, SOLUTION INTRAVENOUS at 13:00

## 2022-04-07 RX ADMIN — LEVOTHYROXINE SODIUM 50 MCG: 50 TABLET ORAL at 05:57

## 2022-04-07 RX ADMIN — FOLIC ACID 1 MG: 1 TABLET ORAL at 12:11

## 2022-04-07 RX ADMIN — METOPROLOL TARTRATE 50 MG: 50 TABLET, FILM COATED ORAL at 08:27

## 2022-04-07 RX ADMIN — METOPROLOL TARTRATE 100 MG: 100 TABLET, FILM COATED ORAL at 22:10

## 2022-04-07 RX ADMIN — RISPERIDONE 0.25 MG: 0.25 TABLET ORAL at 08:27

## 2022-04-07 RX ADMIN — TAZOBACTAM SODIUM AND PIPERACILLIN SODIUM 3.38 G: 375; 3 INJECTION, SOLUTION INTRAVENOUS at 05:57

## 2022-04-07 RX ADMIN — ASPIRIN 81 MG 81 MG: 81 TABLET ORAL at 08:27

## 2022-04-07 RX ADMIN — ATORVASTATIN CALCIUM 40 MG: 40 TABLET, FILM COATED ORAL at 08:27

## 2022-04-07 RX ADMIN — Medication 10 ML: at 21:57

## 2022-04-07 RX ADMIN — MIRTAZAPINE 7.5 MG: 15 TABLET, FILM COATED ORAL at 21:55

## 2022-04-07 RX ADMIN — TRAMADOL HYDROCHLORIDE 50 MG: 50 TABLET, COATED ORAL at 22:02

## 2022-04-08 VITALS
SYSTOLIC BLOOD PRESSURE: 146 MMHG | RESPIRATION RATE: 17 BRPM | HEIGHT: 66 IN | WEIGHT: 209.9 LBS | DIASTOLIC BLOOD PRESSURE: 62 MMHG | TEMPERATURE: 98.3 F | HEART RATE: 65 BPM | BODY MASS INDEX: 33.73 KG/M2 | OXYGEN SATURATION: 96 %

## 2022-04-08 LAB
ANION GAP SERPL CALCULATED.3IONS-SCNC: 7 MMOL/L (ref 5–15)
BASOPHILS # BLD AUTO: 0.05 10*3/MM3 (ref 0–0.2)
BASOPHILS NFR BLD AUTO: 1.2 % (ref 0–1.5)
BUN SERPL-MCNC: 4 MG/DL (ref 8–23)
BUN/CREAT SERPL: 6.2 (ref 7–25)
CALCIUM SPEC-SCNC: 8.8 MG/DL (ref 8.6–10.5)
CHLORIDE SERPL-SCNC: 104 MMOL/L (ref 98–107)
CO2 SERPL-SCNC: 27 MMOL/L (ref 22–29)
CREAT SERPL-MCNC: 0.65 MG/DL (ref 0.57–1)
DACRYOCYTES BLD QL SMEAR: NORMAL
DEPRECATED RDW RBC AUTO: 55.6 FL (ref 37–54)
EGFRCR SERPLBLD CKD-EPI 2021: 89.7 ML/MIN/1.73
EOSINOPHIL # BLD AUTO: 0.52 10*3/MM3 (ref 0–0.4)
EOSINOPHIL NFR BLD AUTO: 12.6 % (ref 0.3–6.2)
ERYTHROCYTE [DISTWIDTH] IN BLOOD BY AUTOMATED COUNT: 17.6 % (ref 12.3–15.4)
GLUCOSE SERPL-MCNC: 124 MG/DL (ref 65–99)
HCT VFR BLD AUTO: 33.5 % (ref 34–46.6)
HGB BLD-MCNC: 10.6 G/DL (ref 12–15.9)
IMM GRANULOCYTES # BLD AUTO: 0.01 10*3/MM3 (ref 0–0.05)
IMM GRANULOCYTES NFR BLD AUTO: 0.2 % (ref 0–0.5)
L PNEUMO1 AG UR QL IA: NEGATIVE
LYMPHOCYTES # BLD AUTO: 2.07 10*3/MM3 (ref 0.7–3.1)
LYMPHOCYTES NFR BLD AUTO: 50.1 % (ref 19.6–45.3)
MCH RBC QN AUTO: 27.5 PG (ref 26.6–33)
MCHC RBC AUTO-ENTMCNC: 31.6 G/DL (ref 31.5–35.7)
MCV RBC AUTO: 87 FL (ref 79–97)
MONOCYTES # BLD AUTO: 0.37 10*3/MM3 (ref 0.1–0.9)
MONOCYTES NFR BLD AUTO: 9 % (ref 5–12)
NEUTROPHILS NFR BLD AUTO: 1.11 10*3/MM3 (ref 1.7–7)
NEUTROPHILS NFR BLD AUTO: 26.9 % (ref 42.7–76)
NRBC BLD AUTO-RTO: 0 /100 WBC (ref 0–0.2)
PLAT MORPH BLD: NORMAL
PLATELET # BLD AUTO: 203 10*3/MM3 (ref 140–450)
PMV BLD AUTO: 9.9 FL (ref 6–12)
POTASSIUM SERPL-SCNC: 3.9 MMOL/L (ref 3.5–5.2)
QT INTERVAL: 368 MS
QTC INTERVAL: 421 MS
RBC # BLD AUTO: 3.85 10*6/MM3 (ref 3.77–5.28)
S PNEUM AG SPEC QL LA: NEGATIVE
SODIUM SERPL-SCNC: 138 MMOL/L (ref 136–145)
WBC MORPH BLD: NORMAL
WBC NRBC COR # BLD: 4.13 10*3/MM3 (ref 3.4–10.8)

## 2022-04-08 PROCEDURE — 99239 HOSP IP/OBS DSCHRG MGMT >30: CPT | Performed by: INTERNAL MEDICINE

## 2022-04-08 PROCEDURE — 85025 COMPLETE CBC W/AUTO DIFF WBC: CPT | Performed by: PHYSICIAN ASSISTANT

## 2022-04-08 PROCEDURE — 80048 BASIC METABOLIC PNL TOTAL CA: CPT | Performed by: PHYSICIAN ASSISTANT

## 2022-04-08 PROCEDURE — 85007 BL SMEAR W/DIFF WBC COUNT: CPT | Performed by: PHYSICIAN ASSISTANT

## 2022-04-08 PROCEDURE — 25010000002 PIPERACILLIN SOD-TAZOBACTAM PER 1 G: Performed by: PHYSICIAN ASSISTANT

## 2022-04-08 RX ORDER — CEFUROXIME AXETIL 250 MG/1
500 TABLET ORAL EVERY 12 HOURS SCHEDULED
Status: DISCONTINUED | OUTPATIENT
Start: 2022-04-08 | End: 2022-04-08 | Stop reason: HOSPADM

## 2022-04-08 RX ORDER — GRANULES FOR ORAL 3 G/1
3 POWDER ORAL ONCE
Status: COMPLETED | OUTPATIENT
Start: 2022-04-08 | End: 2022-04-08

## 2022-04-08 RX ORDER — GRANULES FOR ORAL 3 G/1
3 POWDER ORAL ONCE
Qty: 3 G | Refills: 0
Start: 2022-04-08 | End: 2022-04-08 | Stop reason: HOSPADM

## 2022-04-08 RX ORDER — FOLIC ACID 1 MG/1
1 TABLET ORAL DAILY
Start: 2022-04-09

## 2022-04-08 RX ORDER — CEFUROXIME AXETIL 500 MG/1
500 TABLET ORAL 2 TIMES DAILY
Qty: 8 TABLET | Refills: 0
Start: 2022-04-08 | End: 2022-04-12

## 2022-04-08 RX ADMIN — TAZOBACTAM SODIUM AND PIPERACILLIN SODIUM 3.38 G: 375; 3 INJECTION, SOLUTION INTRAVENOUS at 05:55

## 2022-04-08 RX ADMIN — ATORVASTATIN CALCIUM 40 MG: 40 TABLET, FILM COATED ORAL at 09:14

## 2022-04-08 RX ADMIN — CEFUROXIME AXETIL 500 MG: 250 TABLET ORAL at 14:30

## 2022-04-08 RX ADMIN — ASPIRIN 81 MG 81 MG: 81 TABLET ORAL at 09:14

## 2022-04-08 RX ADMIN — SODIUM CHLORIDE 50 ML/HR: 9 INJECTION, SOLUTION INTRAVENOUS at 02:32

## 2022-04-08 RX ADMIN — FOLIC ACID 1 MG: 1 TABLET ORAL at 09:14

## 2022-04-08 RX ADMIN — RISPERIDONE 0.25 MG: 0.25 TABLET ORAL at 09:14

## 2022-04-08 RX ADMIN — LEVOTHYROXINE SODIUM 50 MCG: 50 TABLET ORAL at 05:56

## 2022-04-08 RX ADMIN — GRANULES FOR ORAL SOLUTION 3 G: 3 POWDER ORAL at 14:30

## 2022-04-10 LAB
BACTERIA SPEC AEROBE CULT: NORMAL
BACTERIA SPEC AEROBE CULT: NORMAL

## 2022-04-13 LAB
BACTERIA SPEC AEROBE CULT: ABNORMAL
GRAM STN SPEC: ABNORMAL
ISOLATED FROM: ABNORMAL
ISOLATED FROM: ABNORMAL

## 2022-07-17 ENCOUNTER — APPOINTMENT (OUTPATIENT)
Dept: GENERAL RADIOLOGY | Facility: HOSPITAL | Age: 77
End: 2022-07-17

## 2022-07-17 ENCOUNTER — HOSPITAL ENCOUNTER (OUTPATIENT)
Facility: HOSPITAL | Age: 77
Setting detail: OBSERVATION
Discharge: NURSING, REHAB OR LONG-TERM CARE | End: 2022-07-19
Attending: EMERGENCY MEDICINE | Admitting: INTERNAL MEDICINE

## 2022-07-17 ENCOUNTER — APPOINTMENT (OUTPATIENT)
Dept: CT IMAGING | Facility: HOSPITAL | Age: 77
End: 2022-07-17

## 2022-07-17 DIAGNOSIS — R13.10 DYSPHAGIA, UNSPECIFIED TYPE: ICD-10-CM

## 2022-07-17 DIAGNOSIS — Z74.09 IMPAIRED FUNCTIONAL MOBILITY, BALANCE, GAIT, AND ENDURANCE: ICD-10-CM

## 2022-07-17 DIAGNOSIS — N39.0 URINARY TRACT INFECTION WITHOUT HEMATURIA, SITE UNSPECIFIED: Primary | ICD-10-CM

## 2022-07-17 PROBLEM — R41.82 ALTERED MENTAL STATUS: Status: ACTIVE | Noted: 2022-07-17

## 2022-07-17 LAB
ALBUMIN SERPL-MCNC: 3 G/DL (ref 3.5–5.2)
ALBUMIN/GLOB SERPL: 0.6 G/DL
ALP SERPL-CCNC: 95 U/L (ref 39–117)
ALT SERPL W P-5'-P-CCNC: 16 U/L (ref 1–33)
ANION GAP SERPL CALCULATED.3IONS-SCNC: 6 MMOL/L (ref 5–15)
AST SERPL-CCNC: 23 U/L (ref 1–32)
BACTERIA UR QL AUTO: ABNORMAL /HPF
BASOPHILS # BLD AUTO: 0.04 10*3/MM3 (ref 0–0.2)
BASOPHILS NFR BLD AUTO: 0.5 % (ref 0–1.5)
BILIRUB SERPL-MCNC: 0.2 MG/DL (ref 0–1.2)
BILIRUB UR QL STRIP: NEGATIVE
BUN SERPL-MCNC: 15 MG/DL (ref 8–23)
BUN/CREAT SERPL: 18.1 (ref 7–25)
CALCIUM SPEC-SCNC: 9.3 MG/DL (ref 8.6–10.5)
CHLORIDE SERPL-SCNC: 105 MMOL/L (ref 98–107)
CLARITY UR: ABNORMAL
CO2 SERPL-SCNC: 29 MMOL/L (ref 22–29)
COLOR UR: YELLOW
CREAT SERPL-MCNC: 0.83 MG/DL (ref 0.57–1)
D-LACTATE SERPL-SCNC: 1.1 MMOL/L (ref 0.5–2)
DEPRECATED RDW RBC AUTO: 63.7 FL (ref 37–54)
EGFRCR SERPLBLD CKD-EPI 2021: 71.8 ML/MIN/1.73
EOSINOPHIL # BLD AUTO: 0.41 10*3/MM3 (ref 0–0.4)
EOSINOPHIL NFR BLD AUTO: 5.4 % (ref 0.3–6.2)
ERYTHROCYTE [DISTWIDTH] IN BLOOD BY AUTOMATED COUNT: 18.7 % (ref 12.3–15.4)
GLOBULIN UR ELPH-MCNC: 5.3 GM/DL
GLUCOSE BLDC GLUCOMTR-MCNC: 87 MG/DL (ref 70–130)
GLUCOSE SERPL-MCNC: 103 MG/DL (ref 65–99)
GLUCOSE UR STRIP-MCNC: NEGATIVE MG/DL
HCT VFR BLD AUTO: 31.1 % (ref 34–46.6)
HGB BLD-MCNC: 9.7 G/DL (ref 12–15.9)
HGB UR QL STRIP.AUTO: ABNORMAL
HOLD SPECIMEN: NORMAL
HYALINE CASTS UR QL AUTO: ABNORMAL /LPF
IMM GRANULOCYTES # BLD AUTO: 0.03 10*3/MM3 (ref 0–0.05)
IMM GRANULOCYTES NFR BLD AUTO: 0.4 % (ref 0–0.5)
KETONES UR QL STRIP: NEGATIVE
LEUKOCYTE ESTERASE UR QL STRIP.AUTO: ABNORMAL
LYMPHOCYTES # BLD AUTO: 3.38 10*3/MM3 (ref 0.7–3.1)
LYMPHOCYTES NFR BLD AUTO: 44.5 % (ref 19.6–45.3)
MAGNESIUM SERPL-MCNC: 1.8 MG/DL (ref 1.6–2.4)
MCH RBC QN AUTO: 28.9 PG (ref 26.6–33)
MCHC RBC AUTO-ENTMCNC: 31.2 G/DL (ref 31.5–35.7)
MCV RBC AUTO: 92.6 FL (ref 79–97)
MONOCYTES # BLD AUTO: 0.59 10*3/MM3 (ref 0.1–0.9)
MONOCYTES NFR BLD AUTO: 7.8 % (ref 5–12)
NEUTROPHILS NFR BLD AUTO: 3.14 10*3/MM3 (ref 1.7–7)
NEUTROPHILS NFR BLD AUTO: 41.4 % (ref 42.7–76)
NITRITE UR QL STRIP: NEGATIVE
NRBC BLD AUTO-RTO: 0 /100 WBC (ref 0–0.2)
PH UR STRIP.AUTO: 5.5 [PH] (ref 5–8)
PLATELET # BLD AUTO: 245 10*3/MM3 (ref 140–450)
PMV BLD AUTO: 9.2 FL (ref 6–12)
POTASSIUM SERPL-SCNC: 4.4 MMOL/L (ref 3.5–5.2)
PROCALCITONIN SERPL-MCNC: 0.03 NG/ML (ref 0–0.25)
PROT SERPL-MCNC: 8.3 G/DL (ref 6–8.5)
PROT UR QL STRIP: ABNORMAL
QT INTERVAL: 404 MS
QTC INTERVAL: 416 MS
RBC # BLD AUTO: 3.36 10*6/MM3 (ref 3.77–5.28)
RBC # UR STRIP: ABNORMAL /HPF
REF LAB TEST METHOD: ABNORMAL
SODIUM SERPL-SCNC: 140 MMOL/L (ref 136–145)
SP GR UR STRIP: 1.02 (ref 1–1.03)
SQUAMOUS #/AREA URNS HPF: ABNORMAL /HPF
TRICHOMONAS #/AREA URNS HPF: ABNORMAL /HPF
TROPONIN T SERPL-MCNC: 0.02 NG/ML (ref 0–0.03)
TSH SERPL DL<=0.05 MIU/L-ACNC: 3.18 UIU/ML (ref 0.27–4.2)
UROBILINOGEN UR QL STRIP: ABNORMAL
VIT B12 BLD-MCNC: 602 PG/ML (ref 211–946)
WBC # UR STRIP: ABNORMAL /HPF
WBC NRBC COR # BLD: 7.59 10*3/MM3 (ref 3.4–10.8)
WHOLE BLOOD HOLD COAG: NORMAL
WHOLE BLOOD HOLD SPECIMEN: NORMAL

## 2022-07-17 PROCEDURE — G0378 HOSPITAL OBSERVATION PER HR: HCPCS

## 2022-07-17 PROCEDURE — P9612 CATHETERIZE FOR URINE SPEC: HCPCS

## 2022-07-17 PROCEDURE — 84484 ASSAY OF TROPONIN QUANT: CPT | Performed by: EMERGENCY MEDICINE

## 2022-07-17 PROCEDURE — 94640 AIRWAY INHALATION TREATMENT: CPT

## 2022-07-17 PROCEDURE — 96365 THER/PROPH/DIAG IV INF INIT: CPT

## 2022-07-17 PROCEDURE — 99220 PR INITIAL OBSERVATION CARE/DAY 70 MINUTES: CPT | Performed by: INTERNAL MEDICINE

## 2022-07-17 PROCEDURE — 93005 ELECTROCARDIOGRAM TRACING: CPT | Performed by: EMERGENCY MEDICINE

## 2022-07-17 PROCEDURE — 25010000002 ENOXAPARIN PER 10 MG: Performed by: INTERNAL MEDICINE

## 2022-07-17 PROCEDURE — 85025 COMPLETE CBC W/AUTO DIFF WBC: CPT | Performed by: EMERGENCY MEDICINE

## 2022-07-17 PROCEDURE — 71045 X-RAY EXAM CHEST 1 VIEW: CPT

## 2022-07-17 PROCEDURE — 84145 PROCALCITONIN (PCT): CPT | Performed by: INTERNAL MEDICINE

## 2022-07-17 PROCEDURE — 82607 VITAMIN B-12: CPT | Performed by: INTERNAL MEDICINE

## 2022-07-17 PROCEDURE — 83735 ASSAY OF MAGNESIUM: CPT | Performed by: EMERGENCY MEDICINE

## 2022-07-17 PROCEDURE — 87040 BLOOD CULTURE FOR BACTERIA: CPT | Performed by: NURSE PRACTITIONER

## 2022-07-17 PROCEDURE — 87449 NOS EACH ORGANISM AG IA: CPT | Performed by: INTERNAL MEDICINE

## 2022-07-17 PROCEDURE — 94799 UNLISTED PULMONARY SVC/PX: CPT

## 2022-07-17 PROCEDURE — 82962 GLUCOSE BLOOD TEST: CPT

## 2022-07-17 PROCEDURE — 80053 COMPREHEN METABOLIC PANEL: CPT | Performed by: EMERGENCY MEDICINE

## 2022-07-17 PROCEDURE — 36415 COLL VENOUS BLD VENIPUNCTURE: CPT

## 2022-07-17 PROCEDURE — 87899 AGENT NOS ASSAY W/OPTIC: CPT | Performed by: INTERNAL MEDICINE

## 2022-07-17 PROCEDURE — 92610 EVALUATE SWALLOWING FUNCTION: CPT

## 2022-07-17 PROCEDURE — 99284 EMERGENCY DEPT VISIT MOD MDM: CPT

## 2022-07-17 PROCEDURE — 25010000002 CEFTRIAXONE PER 250 MG: Performed by: NURSE PRACTITIONER

## 2022-07-17 PROCEDURE — 96372 THER/PROPH/DIAG INJ SC/IM: CPT

## 2022-07-17 PROCEDURE — 83605 ASSAY OF LACTIC ACID: CPT | Performed by: NURSE PRACTITIONER

## 2022-07-17 PROCEDURE — 70450 CT HEAD/BRAIN W/O DYE: CPT

## 2022-07-17 PROCEDURE — 81001 URINALYSIS AUTO W/SCOPE: CPT | Performed by: NURSE PRACTITIONER

## 2022-07-17 PROCEDURE — 84443 ASSAY THYROID STIM HORMONE: CPT | Performed by: INTERNAL MEDICINE

## 2022-07-17 RX ORDER — BISACODYL 10 MG
10 SUPPOSITORY, RECTAL RECTAL DAILY PRN
Status: DISCONTINUED | OUTPATIENT
Start: 2022-07-17 | End: 2022-07-19 | Stop reason: HOSPADM

## 2022-07-17 RX ORDER — SODIUM CHLORIDE 0.9 % (FLUSH) 0.9 %
10 SYRINGE (ML) INJECTION AS NEEDED
Status: DISCONTINUED | OUTPATIENT
Start: 2022-07-17 | End: 2022-07-19 | Stop reason: HOSPADM

## 2022-07-17 RX ORDER — ENOXAPARIN SODIUM 100 MG/ML
40 INJECTION SUBCUTANEOUS DAILY
Status: DISCONTINUED | OUTPATIENT
Start: 2022-07-17 | End: 2022-07-19 | Stop reason: HOSPADM

## 2022-07-17 RX ORDER — RISPERIDONE 0.25 MG/1
0.25 TABLET ORAL 2 TIMES DAILY
Status: DISCONTINUED | OUTPATIENT
Start: 2022-07-17 | End: 2022-07-19 | Stop reason: HOSPADM

## 2022-07-17 RX ORDER — HYDROCODONE BITARTRATE AND ACETAMINOPHEN 5; 325 MG/1; MG/1
1 TABLET ORAL EVERY 6 HOURS PRN
COMMUNITY

## 2022-07-17 RX ORDER — LEVOTHYROXINE SODIUM 0.05 MG/1
50 TABLET ORAL
Status: DISCONTINUED | OUTPATIENT
Start: 2022-07-17 | End: 2022-07-19 | Stop reason: HOSPADM

## 2022-07-17 RX ORDER — METOPROLOL TARTRATE 50 MG/1
50 TABLET, FILM COATED ORAL 2 TIMES DAILY
Status: DISCONTINUED | OUTPATIENT
Start: 2022-07-17 | End: 2022-07-19 | Stop reason: HOSPADM

## 2022-07-17 RX ORDER — AMOXICILLIN 250 MG
2 CAPSULE ORAL 2 TIMES DAILY
Status: DISCONTINUED | OUTPATIENT
Start: 2022-07-17 | End: 2022-07-19 | Stop reason: HOSPADM

## 2022-07-17 RX ORDER — BISACODYL 5 MG/1
5 TABLET, DELAYED RELEASE ORAL DAILY PRN
Status: DISCONTINUED | OUTPATIENT
Start: 2022-07-17 | End: 2022-07-19 | Stop reason: HOSPADM

## 2022-07-17 RX ORDER — SODIUM CHLORIDE 0.9 % (FLUSH) 0.9 %
10 SYRINGE (ML) INJECTION EVERY 12 HOURS SCHEDULED
Status: DISCONTINUED | OUTPATIENT
Start: 2022-07-17 | End: 2022-07-19 | Stop reason: HOSPADM

## 2022-07-17 RX ORDER — IPRATROPIUM BROMIDE AND ALBUTEROL SULFATE 2.5; .5 MG/3ML; MG/3ML
3 SOLUTION RESPIRATORY (INHALATION)
Status: DISCONTINUED | OUTPATIENT
Start: 2022-07-17 | End: 2022-07-19 | Stop reason: HOSPADM

## 2022-07-17 RX ORDER — ASPIRIN 81 MG/1
81 TABLET, CHEWABLE ORAL DAILY
Status: DISCONTINUED | OUTPATIENT
Start: 2022-07-17 | End: 2022-07-19 | Stop reason: HOSPADM

## 2022-07-17 RX ORDER — SODIUM CHLORIDE 9 MG/ML
75 INJECTION, SOLUTION INTRAVENOUS CONTINUOUS
Status: ACTIVE | OUTPATIENT
Start: 2022-07-17 | End: 2022-07-18

## 2022-07-17 RX ORDER — DOXYCYCLINE 100 MG/1
100 CAPSULE ORAL EVERY 12 HOURS SCHEDULED
Status: DISCONTINUED | OUTPATIENT
Start: 2022-07-17 | End: 2022-07-18

## 2022-07-17 RX ORDER — ONDANSETRON 4 MG/1
4 TABLET, FILM COATED ORAL EVERY 6 HOURS PRN
Status: DISCONTINUED | OUTPATIENT
Start: 2022-07-17 | End: 2022-07-19 | Stop reason: HOSPADM

## 2022-07-17 RX ORDER — MIRTAZAPINE 15 MG/1
7.5 TABLET, FILM COATED ORAL NIGHTLY
Status: DISCONTINUED | OUTPATIENT
Start: 2022-07-17 | End: 2022-07-19 | Stop reason: HOSPADM

## 2022-07-17 RX ORDER — POLYETHYLENE GLYCOL 3350 17 G/17G
17 POWDER, FOR SOLUTION ORAL DAILY PRN
Status: DISCONTINUED | OUTPATIENT
Start: 2022-07-17 | End: 2022-07-19 | Stop reason: HOSPADM

## 2022-07-17 RX ORDER — ONDANSETRON 2 MG/ML
4 INJECTION INTRAMUSCULAR; INTRAVENOUS EVERY 6 HOURS PRN
Status: DISCONTINUED | OUTPATIENT
Start: 2022-07-17 | End: 2022-07-19 | Stop reason: HOSPADM

## 2022-07-17 RX ORDER — HYDROCODONE BITARTRATE AND ACETAMINOPHEN 5; 325 MG/1; MG/1
1 TABLET ORAL EVERY 6 HOURS PRN
Status: DISCONTINUED | OUTPATIENT
Start: 2022-07-17 | End: 2022-07-19 | Stop reason: HOSPADM

## 2022-07-17 RX ORDER — ATORVASTATIN CALCIUM 40 MG/1
40 TABLET, FILM COATED ORAL DAILY
Status: DISCONTINUED | OUTPATIENT
Start: 2022-07-17 | End: 2022-07-19 | Stop reason: HOSPADM

## 2022-07-17 RX ADMIN — SODIUM CHLORIDE 1 G: 900 INJECTION INTRAVENOUS at 10:39

## 2022-07-17 RX ADMIN — MIRTAZAPINE 7.5 MG: 15 TABLET, FILM COATED ORAL at 21:23

## 2022-07-17 RX ADMIN — RISPERIDONE 0.25 MG: 0.25 TABLET ORAL at 13:41

## 2022-07-17 RX ADMIN — LEVOTHYROXINE SODIUM 50 MCG: 50 TABLET ORAL at 13:40

## 2022-07-17 RX ADMIN — ASPIRIN 81 MG CHEWABLE TABLET 81 MG: 81 TABLET CHEWABLE at 13:40

## 2022-07-17 RX ADMIN — SODIUM CHLORIDE 500 ML: 9 INJECTION, SOLUTION INTRAVENOUS at 09:30

## 2022-07-17 RX ADMIN — SENNOSIDES AND DOCUSATE SODIUM 2 TABLET: 50; 8.6 TABLET ORAL at 21:23

## 2022-07-17 RX ADMIN — DOXYCYCLINE 100 MG: 100 CAPSULE ORAL at 21:23

## 2022-07-17 RX ADMIN — Medication 10 ML: at 21:23

## 2022-07-17 RX ADMIN — ATORVASTATIN CALCIUM 40 MG: 40 TABLET, FILM COATED ORAL at 13:40

## 2022-07-17 RX ADMIN — Medication 10 ML: at 13:42

## 2022-07-17 RX ADMIN — IPRATROPIUM BROMIDE AND ALBUTEROL SULFATE 3 ML: .5; 3 SOLUTION RESPIRATORY (INHALATION) at 19:19

## 2022-07-17 RX ADMIN — METOPROLOL TARTRATE 50 MG: 50 TABLET, FILM COATED ORAL at 13:40

## 2022-07-17 RX ADMIN — SODIUM CHLORIDE 75 ML/HR: 9 INJECTION, SOLUTION INTRAVENOUS at 13:39

## 2022-07-17 RX ADMIN — ENOXAPARIN SODIUM 40 MG: 40 INJECTION SUBCUTANEOUS at 13:41

## 2022-07-17 RX ADMIN — DOXYCYCLINE 100 MG: 100 CAPSULE ORAL at 13:40

## 2022-07-17 RX ADMIN — METOPROLOL TARTRATE 50 MG: 50 TABLET, FILM COATED ORAL at 21:23

## 2022-07-17 RX ADMIN — RISPERIDONE 0.25 MG: 0.25 TABLET ORAL at 21:28

## 2022-07-17 NOTE — THERAPY EVALUATION
Acute Care - Speech Language Pathology   Swallow Initial Evaluation [facility]   Clinical Swallow Evaluation       Patient Name: Mónica Carrillo  : 1945  MRN: [id number]  Today's Date: 2022               Admit Date: 2022    Visit Dx:     ICD-10-CM ICD-9-CM   1. Urinary tract infection without hematuria, site unspecified  N39.0 599.0   2. Dysphagia, unspecified type  R13.10 787.20     Patient Active Problem List   Diagnosis   • Positive blood culture   • Acute UTI (urinary tract infection)   • Anemia   • Hypocalcemia   • Wound of left foot   • Essential hypertension   • Hypothyroidism   • Hyperlipidemia   • Dementia (HCC)   • CAP (community acquired pneumonia)   • Altered mental status     Past Medical History:   Diagnosis Date   • Dementia (HCC)    • Hypertension    • Mood disorder (HCC)    • Thyroid disease      No past surgical history on file.    SLP Recommendation and Plan  SLP Swallowing Diagnosis: R/O pharyngeal dysphagia (22)  SLP Diet Recommendation: puree, thin liquids (22)  Recommended Precautions and Strategies: upright posture during/after eating, small bites of food and sips of liquid (22)  SLP Rec. for Method of Medication Administration: meds whole, as tolerated (22)     Monitor for Signs of Aspiration: yes, notify SLP if any concerns (22)  Recommended Diagnostics: VFSS (MBS) (22)  Swallow Criteria for Skilled Therapeutic Interventions Met: demonstrates skilled criteria (22)  Anticipated Discharge Disposition (SLP): skilled nursing facility, anticipate therapy at next level of care (22)  Rehab Potential/Prognosis, Swallowing: adequate, monitor progress closely (22)     Predicted Duration Therapy Intervention (Days): until discharge (22)                                  Plan of Care Reviewed With: patient, other (see comments)  Progress:  (eval)      SWALLOW EVALUATION  (last 72 hours)     SLP Adult Swallow Evaluation     Row Name 07/17/22 1500                   Rehab Evaluation    Document Type evaluation  -AV        Subjective Information no complaints  -AV        Patient Observations alert;cooperative  -AV        Patient/Family/Caregiver Comments/Observations no family present at this time  -AV        Patient Effort good  -AV                  General Information    Patient Profile Reviewed yes  -AV        Pertinent History Of Current Problem dementia, came from [facility], ? level1 diet and thins  -AV        Current Method of Nutrition NPO  -AV        Precautions/Limitations, Vision WFL;for purposes of eval  -AV        Precautions/Limitations, Hearing WFL;for purposes of eval  -AV        Prior Level of Function-Communication unknown  baseline dementia  -AV        Prior Level of Function-Swallowing other (see comments)  ? Level 1 and thins per SNF report  -AV        Plans/Goals Discussed with patient  -AV        Barriers to Rehab medically complex;cognitive status  -AV        Patient's Goals for Discharge patient could not state  -AV                  Pain    Additional Documentation Pain Scale: FACES Pre/Post-Treatment (Group)  -AV                  Pain Scale: FACES Pre/Post-Treatment    Pain: FACES Scale, Pretreatment 0-->no hurt  -AV        Posttreatment Pain Rating 0-->no hurt  -AV                  Oral Motor Structure and Function    Dentition Assessment edentulous, does not have dentures  -AV        Secretion Management WNL/WFL  -AV        Mucosal Quality moist, healthy  -AV                  General Eating/Swallowing Observations    Respiratory Support Currently in Use room air  -AV        Eating/Swallowing Skills fed by SLP  -AV        Positioning During Eating upright in bed  -AV        Utensils Used spoon;straw  -AV        Consistencies Trialed regular textures;pureed;thin liquids  -AV                  Clinical Swallow Eval    Oral Prep Phase WFL  -AV        Oral Transit  WFL  -AV        Oral Residue WFL  -AV        Pharyngeal Phase no overt signs/symptoms of pharyngeal impairment  -AV        Esophageal Phase unremarkable  -AV        Clinical Swallow Evaluation Summary patient does not have dentures. No overt s/s with all trials, no oral residue noted. RN states in papers from SNF, looks like ? Level 1 diet with thin liquids?.  RN asked to start with puree rather than solids.  Rec puree and thins, MBS to further assess.  -AV                  SLP Evaluation Clinical Impression    SLP Swallowing Diagnosis R/O pharyngeal dysphagia  -AV        Functional Impact risk of aspiration/pneumonia  -AV        Rehab Potential/Prognosis, Swallowing adequate, monitor progress closely  -AV        Swallow Criteria for Skilled Therapeutic Interventions Met demonstrates skilled criteria  -AV                  Recommendations    Predicted Duration Therapy Intervention (Days) until discharge  -AV        SLP Diet Recommendation puree;thin liquids  -AV        Recommended Diagnostics VFSS (MBS)  -AV        Recommended Precautions and Strategies upright posture during/after eating;small bites of food and sips of liquid  -AV        Oral Care Recommendations Oral Care BID/PRN  -AV        SLP Rec. for Method of Medication Administration meds whole;as tolerated  -AV        Monitor for Signs of Aspiration yes;notify SLP if any concerns  -AV        Anticipated Discharge Disposition (SLP) skilled nursing facility;anticipate therapy at next level of care  -AV              User Key  (r) = Recorded By, (t) = Taken By, (c) = Cosigned By    Initials Name Effective Dates    AV Charissa Etienne, MS CCC-SLP 06/16/21 -                 EDUCATION  The patient has been educated in the following areas:   Dysphagia (Swallowing Impairment) Oral Care/Hydration.     Patient was not wearing a face mask and did not exhibit coughing during this therapy encounter.  Procedure performed was not aerosolizing, involved close contact  (within 6 feet for at least 15 minutes or longer), and did not involve contact with infectious secretions or specimens.  Therapist used appropriate personal protective equipment including gloves, standard procedure mask and eye protection.  Appropriate PPE was worn during the entire therapy session.  Hand hygiene was completed before and after therapy session.            Time Calculation:    Time Calculation- SLP     Row Name 07/17/22 1523             Time Calculation- SLP    SLP Start Time 1500  -AV      SLP Received On 07/17/22  -AV              Untimed Charges    SLP Eval/Re-eval  ST Eval Oral Pharyng Swallow - 60667  -AV      [id number] Eval Oral Pharyng Swallow Minutes 25  -AV              Total Minutes    Untimed Charges Total Minutes 25  -AV       Total Minutes 25  -AV            User Key  (r) = Recorded By, (t) = Taken By, (c) = Cosigned By    Initials Name Provider Type    AV Charissa Etienne MS CCC-SLP Speech and Language Pathologist                Therapy Charges for Today     Code Description Service Date Service Provider Modifiers Qty    [id number]  ST EVAL ORAL PHARYNG SWALLOW 2 7/17/2022 Charissa Etienne MS CCC-SLP GN 1               MS RONNIE Peterson  7/17/2022

## 2022-07-17 NOTE — PLAN OF CARE
Problem: Fall Injury Risk  Goal: Absence of Fall and Fall-Related Injury  Outcome: Ongoing, Progressing  Intervention: Promote Injury-Free Environment  Recent Flowsheet Documentation  Taken 7/17/2022 1400 by Fatmata Bennett RN  Safety Promotion/Fall Prevention:   safety round/check completed   assistive device/personal items within reach   clutter free environment maintained  Taken 7/17/2022 1120 by Fatmata Bennett RN  Safety Promotion/Fall Prevention:   safety round/check completed   assistive device/personal items within reach   clutter free environment maintained     Problem: Skin Injury Risk Increased  Goal: Skin Health and Integrity  Outcome: Ongoing, Progressing  Intervention: Optimize Skin Protection  Recent Flowsheet Documentation  Taken 7/17/2022 1400 by Fatmata Bennett RN  Head of Bed (HOB) Positioning: HOB at 20 degrees  Taken 7/17/2022 1120 by Fatmata Bennett RN  Pressure Reduction Techniques:   weight shift assistance provided   heels elevated off bed  Head of Bed (HOB) Positioning: HOB at 20 degrees  Pressure Reduction Devices:   heel offloading device utilized   positioning supports utilized   pressure-redistributing mattress utilized     Problem: Adult Inpatient Plan of Care  Goal: Plan of Care Review  Outcome: Ongoing, Progressing  Flowsheets (Taken 7/17/2022 1932)  Outcome Evaluation: Patient alert, oriented x3, Vital signs stable.Continuous O2 in progress at 1.5 L/min, respiration unlabored and symmetrical. Pureed diet tolerated, no swallowing issues noted. Repositioned Q2H, for Wound Consult.  Goal: Patient-Specific Goal (Individualized)  Outcome: Ongoing, Progressing  Goal: Absence of Hospital-Acquired Illness or Injury  Outcome: Ongoing, Progressing  Intervention: Identify and Manage Fall Risk  Recent Flowsheet Documentation  Taken 7/17/2022 1400 by Fatmata Bennett RN  Safety Promotion/Fall Prevention:   safety round/check completed   assistive device/personal items within reach    clutter free environment maintained  Taken 7/17/2022 1120 by Fatmata Bennett RN  Safety Promotion/Fall Prevention:   safety round/check completed   assistive device/personal items within reach   clutter free environment maintained  Intervention: Prevent Skin Injury  Recent Flowsheet Documentation  Taken 7/17/2022 1400 by Fatmata Bennett RN  Body Position:   turned   left  Taken 7/17/2022 1120 by Fatmata Bennett RN  Body Position: supine  Intervention: Prevent and Manage VTE (Venous Thromboembolism) Risk  Recent Flowsheet Documentation  Taken 7/17/2022 1120 by Fatmata Bennett RN  Activity Management: (extremities contracted)   bedrest   unable to complete activity (see comments)  VTE Prevention/Management: (lovenox) other (see comments)  Goal: Optimal Comfort and Wellbeing  Outcome: Ongoing, Progressing  Intervention: Provide Person-Centered Care  Recent Flowsheet Documentation  Taken 7/17/2022 1400 by Fatmata Bennett RN  Trust Relationship/Rapport: care explained  Taken 7/17/2022 1120 by Fatmata Bennett RN  Trust Relationship/Rapport: care explained  Goal: Readiness for Transition of Care  Outcome: Ongoing, Progressing  Intervention: Mutually Develop Transition Plan  Recent Flowsheet Documentation  Taken 7/17/2022 1453 by Fatmata Bennett RN  Equipment Currently Used at Home: (Resides in long term care facility) other (see comments)  Transportation Anticipated: agency  Transportation Concerns: none  Patient/Family Anticipated Services at Transition:   Patient/Family Anticipates Transition to: long-term care facility     Problem: Pain Chronic (Persistent) (Comorbidity Management)  Goal: Acceptable Pain Control and Functional Ability  Outcome: Ongoing, Progressing  Intervention: Optimize Psychosocial Wellbeing  Recent Flowsheet Documentation  Taken 7/17/2022 1400 by Fatmata Bennett RN  Diversional Activities: television  Taken 7/17/2022 1120 by Fatmata Bennett RN  Diversional Activities:  television   Goal Outcome Evaluation:              Outcome Evaluation: Patient alert, oriented x3, Vital signs stable.Continuous O2 in progress at 1.5 L/min, respiration unlabored and symmetrical. Pureed diet tolerated, no swallowing issues noted. Repositioned Q2H, for Wound Consult.

## 2022-07-17 NOTE — H&P
[facility] Services  HISTORY AND PHYSICAL    Patient Name: Mónica Carrillo  : 1945  MRN: [id number]  Primary Care Physician: Molina Domínguez MD  Date of admission: 2022    Subjective   Subjective     Chief Complaint:  Brought from [facility]    HPI:  Mónica Carrillo is a 77 y.o. female with past medical history of essential hypertension, dyslipidemia hypothyroidism, advanced Alzheimer dementia, dyslipidemia who is a resident of a nursing home with total care presented to the hospital today brought for concern of altered mental status.  Patient is demented and poor historian.  Son at bedside and did not know much about the history.  As per ER signout, nursing staff at the nursing home found the patient to be unresponsive this morning so called 911.  EMS arrived and brought the patient to the hospital and on route, they applied ammonia capsule inhalant and the patient responded and woke up.  At the time of the encounter she is confused and her son at bedside reported that that around her baseline.    Work-up in ER is concerning for urinary tract infection.  X-ray is concerning for bilateral pneumonia.  Patient will be admitted to the hospital service for further treatment    Review of Systems   Endocrine: Positive for heat intolerance.      Unable to obtain reliable review of systems because of her altered mental status    Personal History     Past Medical History:   Diagnosis Date   • Dementia (HCC)    • Hypertension    • Mood disorder (HCC)    • Thyroid disease      No past surgical history on file.    Family History:  family history includes No Known Problems in her father and mother. Otherwise pertinent FHx was reviewed and unremarkable.     Social History:    Social History     Social History Narrative   • Not on file     Medications:  Available home medication information reviewed.  (Not in a hospital admission)    No Known Allergies    Objective   Objective      Vital Signs:   Temp:  [98.7 °F (37.1 °C)] 98.7 °F (37.1 °C)  Heart Rate:  [57-66] 57  Resp:  [16-18] 18  BP: (119-129)/(63-64) 129/64       Physical Exam   General: Pleasantly confused and minimally conversant  Head: Atraumatic and normocephalic, without obvious abnormality  Eyes:   Conjunctivae and sclerae normal, no Icterus. No pallor  Ears:  Ears appear intact with no abnormalities noted  Throat: No oral lesions, no thrush, oral mucosa moist  Neck: Supple, trachea midline, no thyromegaly  Back:   No kyphoscoliosis present. No tenderness to palpation,   no sacral edema  Lungs: Clear to auscultation bilaterally, left lower lobe coarse crackles  Heart:  Normal S1 and S2, no murmur, no gallop, No JVD, no lower extremity swelling  Abdomen:  Soft, no tenderness, no organomegaly, normal bowel sounds  Normal bowel sounds, no masses, no organomegaly. Soft, nontender, nondistended, no guarding, no rebound tenderness.  Extremities: Bilateral hand contractures, right hand palmar aspect is completely macerated with foul-smelling necrotic skin  Skin: No bleeding, bruising or rash, normal skin turgor and elasticity  Neurologic: Cranial nerves appear intact with no evidence of facial asymmetry, normal motor and sensory functions in all 4 extremities  Psych: Alert and oriented x 3, normal mood    Result Review:  I have personally reviewed the results from the time of this admission to 7/17/2022 10:44 EDT and agree with these findings:  [x]  Laboratory list / accordion  []  Microbiology  [x]  Radiology  []  EKG/Telemetry   []  Cardiology/Vascular   []  Pathology  []  Old records    LAB RESULTS:      Lab 07/17/22  0839   WBC 7.59   HEMOGLOBIN 9.7*   HEMATOCRIT 31.1*   PLATELETS 245   NEUTROS ABS 3.14   IMMATURE GRANS (ABS) 0.03   LYMPHS ABS 3.38*   MONOS ABS 0.59   EOS ABS 0.41*   MCV 92.6   LACTATE 1.1         Lab 07/17/22  0839   SODIUM 140   POTASSIUM 4.4   CHLORIDE 105   CO2 29.0   ANION GAP 6.0   BUN 15   CREATININE 0.83    EGFR 71.8   GLUCOSE 103*   CALCIUM 9.3   MAGNESIUM 1.8         Lab 07/17/22  0839   TOTAL PROTEIN 8.3   ALBUMIN 3.00*   GLOBULIN 5.3   ALT (SGPT) 16   AST (SGOT) 23   BILIRUBIN 0.2   ALK PHOS 95         Lab 07/17/22  0839   TROPONIN T 0.017       UA    Urinalysis 4/5/22 4/5/22 6/23/22 6/23/22 7/17/22 1927 1927 2127 2127    Squamous Epithelial Cells, UA 13-20 (A)   13-20 (A)    Specific Cascade, UA  1.025 1.013  1.016   Ketones, UA  Negative Negative  Negative   Blood, UA  Moderate (2+) (A) Small (1+) (A)  Moderate (2+) (A)   Leukocytes, UA  Large (3+) (A) Trace (A)  Large (3+) (A)   Nitrite, UA  Negative Negative  Negative   RBC, UA Unable to determine due to loaded field (A)   3-6 (A)    WBC, UA Too Numerous to Count (A)   13-20 (A)    Bacteria, UA Trace   Trace    (A) Abnormal value            Microbiology Results (last 10 days)     ** No results found for the last 240 hours. **        CT Head Without Contrast    Result Date: 7/17/2022  CT HEAD WO CONTRAST-  Date of Exam: 7/17/2022 8:48 AM  Indication: ams.  Altered mental status.  Comparison: None available.  Technique:  Without contrast, contiguous axial CT images of the head were obtained from skull base to vertex.  Coronal and sagittal reconstructions were performed.  Automated exposure control and iterative reconstruction methods were used.  FINDINGS There is a large region of primarily white matter hypodensity in the left anterior frontal region. This may be due to chronic ischemic change. There are no prior studies available for comparison. Vasogenic edema from neoplasm could have a similar appearance. This could be further evaluated by MRI with and without contrast, if clinically warranted. There is periventricular white matter hypodensity most commonly secondary to chronic small vessel ischemic change. There is mild parenchymal volume loss. The ventricles are appropriate in size and configuration for degree of volume loss and patient age. There is no  evidence of mass effect, midline shift, acute hemorrhage. No abnormal fluid collections are identified. Atherosclerotic vascular calcification is present. There is opacification of the left maxillary sinus and associated periosteal thickening. There is an air-fluid level in the left sphenoid sinus. Correlate clinically for acute sinusitis. The mastoid air cells and middle ear appear clear. No acute calvarial defects are seen. The orbits are unremarkable.      Impression:  1. Large region of white matter hypodensity in the anterior left frontal region may be due to chronic ischemic change. Vasogenic edema from neoplasm could potentially also cause this appearance. There are no prior studies available for comparison. Further evaluation by MRI with and without contrast media value, if clinically warranted. 2. Parenchymal volume loss and probable chronic small vessel ischemic change. 3. Small air-fluid level in the left sphenoid sinus may indicate acute sinusitis. Correlate clinically. There is complete opacification of the visualized left maxillary sinus, of unknown chronicity. Brain MRI is more sensitive to evaluate for acute or subacute infarcts and to evaluate for intracranial metastatic disease.  This report was finalized on 7/17/2022 9:14 AM by Thanh Rae MD.      XR Chest 1 View    Result Date: 7/17/2022  DATE OF EXAM: 7/17/2022 8:29 AM  PROCEDURE: XR CHEST 1 VW-  INDICATIONS: Weak/Dizzy/AMS triage protocol  COMPARISON: 4/5/2022  TECHNIQUE: Single radiographic view of the chest was obtained.  FINDINGS: There are linear opacities in the right mid and upper lung which may be due to scarring or atelectasis. There is left basilar opacity which is obscured by the overlying enlarged heart border, and may be due to pneumonia or atelectasis. Question small amount of left pleural fluid. No pneumothorax is seen. Cardiomegaly is present. There is pulmonary vascular congestion. Aortic vascular calcification is present.       Impression:  1. Cardiomegaly with pulmonary vascular congestion. 2. Linear areas of consolidation in the right upper lung may be due to scarring or atelectasis. 3. left basilar opacities mostly obscured by overlying left heart border may be due to pneumonia or atelectasis. Question small left effusion.  This report was finalized on 7/17/2022 8:54 AM by Thanh Rae MD.            Assessment & Plan   Assessment & Plan     Active Hospital Problems    Diagnosis  POA   • Altered mental status [R41.82]  Yes       Summary :  Mónica Carrillo is a 77 y.o. female with past medical history of essential hypertension, dyslipidemia hypothyroidism, advanced Alzheimer dementia, dyslipidemia who is a resident of a nursing home with total care presented to the hospital today brought for concern of altered mental status and was found to have UTI and pneumonia    Assessment and plan:  Acute metabolic encephalopathy  Anterior left frontal hypodensity concerning for old stroke or brain tumor  Urine tract infection, present on admission  Possible aspiration pneumonia  Acute sinusitis  · Patient presented from the nursing home with altered mental status.  Nursing staff could not wake the patient up to call 911.  She responded to ammonia capsule inhaler on route to the hospital.  · In ER she is oriented to her son but otherwise confused  · Work-up in the ER revealed UTI and bilateral infiltrates concerning for pneumonia and her chest x-ray, possible aspiration  · I will start her on IV Rocephin and obtain cultures and pneumonia work-up  · CT scan head showed generalized brain atrophy and large anterior left frontal hypodensity concerning for old stroke or brain tumor.  Discussed with her daughter/the next of kin the need to pursue further work-up including MRI brain with and without contrast to further identify that brain lesion but she reported that she and the rest of her family members are not interested to proceed with any further  work-up given her poor quality of life.  He want to treat the current infection and transition back to the nursing home.  · For now will manage conservatively with IV antibiotics and treat her UTI and pneumonia and obtain speech eval and hold off MRI brain till I hear from the family  · Patient is DNR and DNI better her daughter    Right hand macerated skin of the palmar aspect  · Wound care consult      Essential hypertension  Hyperlipidemia  · Continue Metoprolol.   · Continue statin     Hypothyroidism  · Check TSH   · Continue synthroid     Anemia  · Anemia of chronic disease  · Indication for blood transfusion, continue to monitor CBC     Alzheimer's Dementia  · Check TSH and B12 level  · Continue Remeron, Risperidone     DVT prophylaxis: Heparin      CODE STATUS: DNR and DNI  There are no questions and answers to display.         Adithya Pace MD  07/17/22

## 2022-07-17 NOTE — PLAN OF CARE
Goal Outcome Evaluation:  Plan of Care Reviewed With: patient, other (see comments)        Progress:  (eval)       SLP evaluation completed. Will address dysphagia. Please see note for further details and recommendations.

## 2022-07-18 LAB
ALBUMIN SERPL-MCNC: 3.1 G/DL (ref 3.5–5.2)
ALBUMIN/GLOB SERPL: 0.6 G/DL
ALP SERPL-CCNC: 117 U/L (ref 39–117)
ALT SERPL W P-5'-P-CCNC: 29 U/L (ref 1–33)
ANION GAP SERPL CALCULATED.3IONS-SCNC: 9 MMOL/L (ref 5–15)
AST SERPL-CCNC: 39 U/L (ref 1–32)
BASOPHILS # BLD AUTO: 0.03 10*3/MM3 (ref 0–0.2)
BASOPHILS NFR BLD AUTO: 0.4 % (ref 0–1.5)
BILIRUB SERPL-MCNC: 0.3 MG/DL (ref 0–1.2)
BUN SERPL-MCNC: 10 MG/DL (ref 8–23)
BUN/CREAT SERPL: 14.5 (ref 7–25)
CALCIUM SPEC-SCNC: 9.3 MG/DL (ref 8.6–10.5)
CHLORIDE SERPL-SCNC: 105 MMOL/L (ref 98–107)
CO2 SERPL-SCNC: 28 MMOL/L (ref 22–29)
CREAT SERPL-MCNC: 0.69 MG/DL (ref 0.57–1)
CRP SERPL-MCNC: 1.48 MG/DL (ref 0–0.5)
DEPRECATED RDW RBC AUTO: 60.6 FL (ref 37–54)
EGFRCR SERPLBLD CKD-EPI 2021: 88.4 ML/MIN/1.73
EOSINOPHIL # BLD AUTO: 0.4 10*3/MM3 (ref 0–0.4)
EOSINOPHIL NFR BLD AUTO: 4.8 % (ref 0.3–6.2)
ERYTHROCYTE [DISTWIDTH] IN BLOOD BY AUTOMATED COUNT: 18.3 % (ref 12.3–15.4)
GLOBULIN UR ELPH-MCNC: 5.3 GM/DL
GLUCOSE SERPL-MCNC: 118 MG/DL (ref 65–99)
HCT VFR BLD AUTO: 31.6 % (ref 34–46.6)
HGB BLD-MCNC: 10 G/DL (ref 12–15.9)
IMM GRANULOCYTES # BLD AUTO: 0.04 10*3/MM3 (ref 0–0.05)
IMM GRANULOCYTES NFR BLD AUTO: 0.5 % (ref 0–0.5)
L PNEUMO1 AG UR QL IA: NEGATIVE
LYMPHOCYTES # BLD AUTO: 2.59 10*3/MM3 (ref 0.7–3.1)
LYMPHOCYTES NFR BLD AUTO: 31 % (ref 19.6–45.3)
MAGNESIUM SERPL-MCNC: 1.6 MG/DL (ref 1.6–2.4)
MCH RBC QN AUTO: 28.5 PG (ref 26.6–33)
MCHC RBC AUTO-ENTMCNC: 31.6 G/DL (ref 31.5–35.7)
MCV RBC AUTO: 90 FL (ref 79–97)
MONOCYTES # BLD AUTO: 0.56 10*3/MM3 (ref 0.1–0.9)
MONOCYTES NFR BLD AUTO: 6.7 % (ref 5–12)
NEUTROPHILS NFR BLD AUTO: 4.74 10*3/MM3 (ref 1.7–7)
NEUTROPHILS NFR BLD AUTO: 56.6 % (ref 42.7–76)
NRBC BLD AUTO-RTO: 0 /100 WBC (ref 0–0.2)
PHOSPHATE SERPL-MCNC: 3 MG/DL (ref 2.5–4.5)
PLAT MORPH BLD: NORMAL
PLATELET # BLD AUTO: 235 10*3/MM3 (ref 140–450)
PMV BLD AUTO: 10 FL (ref 6–12)
POTASSIUM SERPL-SCNC: 4.6 MMOL/L (ref 3.5–5.2)
PROCALCITONIN SERPL-MCNC: 0.04 NG/ML (ref 0–0.25)
PROT SERPL-MCNC: 8.4 G/DL (ref 6–8.5)
RBC # BLD AUTO: 3.51 10*6/MM3 (ref 3.77–5.28)
RBC MORPH BLD: NORMAL
S PNEUM AG SPEC QL LA: NEGATIVE
SARS-COV-2 RNA PNL SPEC NAA+PROBE: NOT DETECTED
SODIUM SERPL-SCNC: 142 MMOL/L (ref 136–145)
WBC MORPH BLD: NORMAL
WBC NRBC COR # BLD: 8.36 10*3/MM3 (ref 3.4–10.8)

## 2022-07-18 PROCEDURE — 97162 PT EVAL MOD COMPLEX 30 MIN: CPT

## 2022-07-18 PROCEDURE — 94799 UNLISTED PULMONARY SVC/PX: CPT

## 2022-07-18 PROCEDURE — 85007 BL SMEAR W/DIFF WBC COUNT: CPT | Performed by: INTERNAL MEDICINE

## 2022-07-18 PROCEDURE — G0378 HOSPITAL OBSERVATION PER HR: HCPCS

## 2022-07-18 PROCEDURE — 86140 C-REACTIVE PROTEIN: CPT | Performed by: INTERNAL MEDICINE

## 2022-07-18 PROCEDURE — 94664 DEMO&/EVAL PT USE INHALER: CPT

## 2022-07-18 PROCEDURE — 84145 PROCALCITONIN (PCT): CPT | Performed by: INTERNAL MEDICINE

## 2022-07-18 PROCEDURE — 99225 PR SBSQ OBSERVATION CARE/DAY 25 MINUTES: CPT | Performed by: INTERNAL MEDICINE

## 2022-07-18 PROCEDURE — 97166 OT EVAL MOD COMPLEX 45 MIN: CPT

## 2022-07-18 PROCEDURE — 80053 COMPREHEN METABOLIC PANEL: CPT | Performed by: INTERNAL MEDICINE

## 2022-07-18 PROCEDURE — 85025 COMPLETE CBC W/AUTO DIFF WBC: CPT | Performed by: INTERNAL MEDICINE

## 2022-07-18 PROCEDURE — 96372 THER/PROPH/DIAG INJ SC/IM: CPT

## 2022-07-18 PROCEDURE — 25010000002 ENOXAPARIN PER 10 MG: Performed by: INTERNAL MEDICINE

## 2022-07-18 PROCEDURE — U0004 COV-19 TEST NON-CDC HGH THRU: HCPCS | Performed by: INTERNAL MEDICINE

## 2022-07-18 PROCEDURE — 84100 ASSAY OF PHOSPHORUS: CPT | Performed by: INTERNAL MEDICINE

## 2022-07-18 PROCEDURE — 83735 ASSAY OF MAGNESIUM: CPT | Performed by: INTERNAL MEDICINE

## 2022-07-18 PROCEDURE — U0005 INFEC AGEN DETEC AMPLI PROBE: HCPCS | Performed by: INTERNAL MEDICINE

## 2022-07-18 RX ORDER — CEFDINIR 300 MG/1
300 CAPSULE ORAL EVERY 12 HOURS SCHEDULED
Status: DISCONTINUED | OUTPATIENT
Start: 2022-07-18 | End: 2022-07-19 | Stop reason: HOSPADM

## 2022-07-18 RX ADMIN — CEFDINIR 300 MG: 300 CAPSULE ORAL at 14:32

## 2022-07-18 RX ADMIN — ASPIRIN 81 MG CHEWABLE TABLET 81 MG: 81 TABLET CHEWABLE at 09:12

## 2022-07-18 RX ADMIN — RISPERIDONE 0.25 MG: 0.25 TABLET ORAL at 20:59

## 2022-07-18 RX ADMIN — METOPROLOL TARTRATE 50 MG: 50 TABLET, FILM COATED ORAL at 20:59

## 2022-07-18 RX ADMIN — METOPROLOL TARTRATE 50 MG: 50 TABLET, FILM COATED ORAL at 09:13

## 2022-07-18 RX ADMIN — IPRATROPIUM BROMIDE AND ALBUTEROL SULFATE 3 ML: .5; 3 SOLUTION RESPIRATORY (INHALATION) at 11:50

## 2022-07-18 RX ADMIN — RISPERIDONE 0.25 MG: 0.25 TABLET ORAL at 09:12

## 2022-07-18 RX ADMIN — ENOXAPARIN SODIUM 40 MG: 40 INJECTION SUBCUTANEOUS at 09:13

## 2022-07-18 RX ADMIN — LEVOTHYROXINE SODIUM 50 MCG: 50 TABLET ORAL at 06:07

## 2022-07-18 RX ADMIN — IPRATROPIUM BROMIDE AND ALBUTEROL SULFATE 3 ML: .5; 3 SOLUTION RESPIRATORY (INHALATION) at 15:38

## 2022-07-18 RX ADMIN — CEFDINIR 300 MG: 300 CAPSULE ORAL at 20:59

## 2022-07-18 RX ADMIN — IPRATROPIUM BROMIDE AND ALBUTEROL SULFATE 3 ML: .5; 3 SOLUTION RESPIRATORY (INHALATION) at 08:06

## 2022-07-18 RX ADMIN — MIRTAZAPINE 7.5 MG: 15 TABLET, FILM COATED ORAL at 20:59

## 2022-07-18 RX ADMIN — ATORVASTATIN CALCIUM 40 MG: 40 TABLET, FILM COATED ORAL at 09:12

## 2022-07-18 RX ADMIN — DOXYCYCLINE 100 MG: 100 CAPSULE ORAL at 09:12

## 2022-07-18 RX ADMIN — Medication 10 ML: at 09:13

## 2022-07-18 RX ADMIN — SENNOSIDES AND DOCUSATE SODIUM 2 TABLET: 50; 8.6 TABLET ORAL at 20:59

## 2022-07-18 RX ADMIN — SODIUM CHLORIDE 75 ML/HR: 9 INJECTION, SOLUTION INTRAVENOUS at 06:13

## 2022-07-18 RX ADMIN — Medication 10 ML: at 20:59

## 2022-07-18 RX ADMIN — IPRATROPIUM BROMIDE AND ALBUTEROL SULFATE 3 ML: .5; 3 SOLUTION RESPIRATORY (INHALATION) at 19:48

## 2022-07-18 RX ADMIN — SENNOSIDES AND DOCUSATE SODIUM 2 TABLET: 50; 8.6 TABLET ORAL at 09:12

## 2022-07-18 NOTE — PLAN OF CARE
Pt resting in bed at this time; VSS; alert to self and place only; no needs voiced at this time

## 2022-07-18 NOTE — PLAN OF CARE
Goal Outcome Evaluation:  Plan of Care Reviewed With: patient           Outcome Evaluation: PT EVAL COMPLETED. Pt presents with generalized weakness and debility, decreased ROM, and dependence with functional mobility. Pt required dependence for rolling side to side, mech lift to recliner, then BLE therex PROM in all planes. VSS on RA. Pt will benefit from 3x/wk IPPT services. Rec d/c back to SNF.

## 2022-07-18 NOTE — PROGRESS NOTES
[facility]  PROGRESS NOTE    Patient Name: Mónica Carrillo  : 1945  MRN: [id number]    Date of Admission: 2022  Primary Care Physician: Molina Domínguez MD    Subjective   Subjective     CC:  Brought from nursing home with altered mental status    HPI:  Ms. Carrillo is 77.  She has hypertension dyslipidemia hypothyroidism advanced Alzheimer's dementia.  Came with altered mental status.  Poor historian.  Was found unresponsive at the nursing home.  Woke up to ammonia capsule.  Family was not interested in any further management for the brain tumor just wanted to treat the infection and get her back to the nursing home.  DNR/DNI.  Treating for UTI and pneumonia.  I have discussed with the patient who is conversant and pleasantly confused tolerating p.o.  She denies any pain.  Denies any shortness of breath.    ROS:  Not reliable given patient's dementia however she denies dysuria reports she ambulates    Objective   Objective     Vital Signs:   Temp:  [97.2 °F (36.2 °C)-97.8 °F (36.6 °C)] 97.7 °F (36.5 °C)  Heart Rate:  [54-67] 64  Resp:  [16-18] 18  BP: (119-155)/(63-85) 152/69  Flow (L/min):  [1.5] 1.5     Physical Exam:  General: Pleasantly confused   Lungs: Clear to auscultation bilaterally, left lower lobe coarse crackles  Heart:  Normal S1 and S2, no murmur, no gallop, No JVD, no lower extremity swelling  Abdomen:  Normal bowel sounds, no masses, no organomegaly.   Extremities: Bilateral hand contractures, right hand palmar aspect is completely macerated with foul-smelling necrotic skin  Skin: No bleeding, bruising or rash, normal skin turgor and elasticity  Neurologic: Cranial nerves appear intact with no evidence of facial asymmetry, normal motor and sensory functions in all 4 extremities  Psych: Alert and disoriented, normal mood    Results Reviewed:  LAB RESULTS:      Lab 22  0839   WBC 7.59   HEMOGLOBIN 9.7*   HEMATOCRIT 31.1*   PLATELETS 245    NEUTROS ABS 3.14   IMMATURE GRANS (ABS) 0.03   LYMPHS ABS 3.38*   MONOS ABS 0.59   EOS ABS 0.41*   MCV 92.6   PROCALCITONIN 0.03   LACTATE 1.1         Lab 07/17/22  0839   SODIUM 140   POTASSIUM 4.4   CHLORIDE 105   CO2 29.0   ANION GAP 6.0   BUN 15   CREATININE 0.83   EGFR 71.8   GLUCOSE 103*   CALCIUM 9.3   MAGNESIUM 1.8   TSH 3.180         Lab 07/17/22  0839   TOTAL PROTEIN 8.3   ALBUMIN 3.00*   GLOBULIN 5.3   ALT (SGPT) 16   AST (SGOT) 23   BILIRUBIN 0.2   ALK PHOS 95         Lab 07/17/22  0839   TROPONIN T 0.017             Lab 07/17/22  0839   VITAMIN B 12 602         Brief Urine Lab Results  (Last result in the past 365 days)      Color   Clarity   Blood   Leuk Est   Nitrite   Protein   CREAT   Urine HCG        07/17/22 0929 Yellow   Turbid   Moderate (2+)   Large (3+)   Negative   100 mg/dL (2+)                 Microbiology Results Abnormal     Procedure Component Value - Date/Time    S. Pneumo Ag Urine or CSF - Urine, Urine, Clean Catch [[id number]]  (Normal) Collected: 07/17/22 1801    Lab Status: Final result Specimen: Urine, Clean Catch Updated: 07/18/22 0924     Strep Pneumo Ag Negative    Legionella Antigen, Urine - Urine, Urine, Clean Catch [[id number]]  (Normal) Collected: 07/17/22 1801    Lab Status: Final result Specimen: Urine, Clean Catch Updated: 07/18/22 0924     LEGIONELLA ANTIGEN, URINE Negative          CT Head Without Contrast    Result Date: 7/17/2022  CT HEAD WO CONTRAST-  Date of Exam: 7/17/2022 8:48 AM  Indication: ams.  Altered mental status.  Comparison: None available.  Technique:  Without contrast, contiguous axial CT images of the head were obtained from skull base to vertex.  Coronal and sagittal reconstructions were performed.  Automated exposure control and iterative reconstruction methods were used.  FINDINGS There is a large region of primarily white matter hypodensity in the left anterior frontal region. This may be due to chronic ischemic change. There are no prior studies  available for comparison. Vasogenic edema from neoplasm could have a similar appearance. This could be further evaluated by MRI with and without contrast, if clinically warranted. There is periventricular white matter hypodensity most commonly secondary to chronic small vessel ischemic change. There is mild parenchymal volume loss. The ventricles are appropriate in size and configuration for degree of volume loss and patient age. There is no evidence of mass effect, midline shift, acute hemorrhage. No abnormal fluid collections are identified. Atherosclerotic vascular calcification is present. There is opacification of the left maxillary sinus and associated periosteal thickening. There is an air-fluid level in the left sphenoid sinus. Correlate clinically for acute sinusitis. The mastoid air cells and middle ear appear clear. No acute calvarial defects are seen. The orbits are unremarkable.      Impression:  1. Large region of white matter hypodensity in the anterior left frontal region may be due to chronic ischemic change. Vasogenic edema from neoplasm could potentially also cause this appearance. There are no prior studies available for comparison. Further evaluation by MRI with and without contrast media value, if clinically warranted. 2. Parenchymal volume loss and probable chronic small vessel ischemic change. 3. Small air-fluid level in the left sphenoid sinus may indicate acute sinusitis. Correlate clinically. There is complete opacification of the visualized left maxillary sinus, of unknown chronicity. Brain MRI is more sensitive to evaluate for acute or subacute infarcts and to evaluate for intracranial metastatic disease.  This report was finalized on 7/17/2022 9:14 AM by Thanh Rae MD.      XR Chest 1 View    Result Date: 7/17/2022  DATE OF EXAM: 7/17/2022 8:29 AM  PROCEDURE: XR CHEST 1 VW-  INDICATIONS: Weak/Dizzy/AMS triage protocol  COMPARISON: 4/5/2022  TECHNIQUE: Single radiographic view of the  chest was obtained.  FINDINGS: There are linear opacities in the right mid and upper lung which may be due to scarring or atelectasis. There is left basilar opacity which is obscured by the overlying enlarged heart border, and may be due to pneumonia or atelectasis. Question small amount of left pleural fluid. No pneumothorax is seen. Cardiomegaly is present. There is pulmonary vascular congestion. Aortic vascular calcification is present.      Impression:  1. Cardiomegaly with pulmonary vascular congestion. 2. Linear areas of consolidation in the right upper lung may be due to scarring or atelectasis. 3. left basilar opacities mostly obscured by overlying left heart border may be due to pneumonia or atelectasis. Question small left effusion.  This report was finalized on 7/17/2022 8:54 AM by Thanh Rae MD.            I have reviewed the medications:  Scheduled Meds:aspirin, 81 mg, Oral, Daily  atorvastatin, 40 mg, Oral, Daily  cefTRIAXone, 2 g, Intravenous, Q24H  doxycycline, 100 mg, Oral, Q12H  enoxaparin, 40 mg, Subcutaneous, Daily  ipratropium-albuterol, 3 mL, Nebulization, 4x Daily - RT  levothyroxine, 50 mcg, Oral, Q AM  metoprolol tartrate, 50 mg, Oral, BID  mirtazapine, 7.5 mg, Oral, Nightly  risperiDONE, 0.25 mg, Oral, BID  senna-docusate sodium, 2 tablet, Oral, BID  sodium chloride, 10 mL, Intravenous, Q12H      Continuous Infusions:sodium chloride, 75 mL/hr, Last Rate: 75 mL/hr (07/18/22 0613)      PRN Meds:.senna-docusate sodium **AND** polyethylene glycol **AND** bisacodyl **AND** bisacodyl  •  HYDROcodone-acetaminophen  •  ondansetron **OR** ondansetron  •  sodium chloride  •  [COMPLETED] Insert peripheral IV **AND** sodium chloride  •  sodium chloride    Assessment & Plan   Assessment & Plan     Active Hospital Problems    Diagnosis  POA   • Altered mental status [R41.82]  Yes      Resolved Hospital Problems   No resolved problems to display.        Brief Hospital Course to date:  Mónica Carrillo  is a 77 y.o. female who came from the nursing home with altered mental status seems resolved to her baseline now.  Able to tolerate p.o.  Discussed with case management about possible transition back to the nursing home today.  She stated she would work on the transport.    Altered mental status   -resolved  UTI/possible pneumonia   -no dysuria no dyspnea or cough reported by the patient is on antibiotics IV can transition to oral as is tolerating p.o. and is not septic.  Transition to cefdinir  Alzheimer's dementia   -seems at her baseline    Anemia   -seems she has iron deficiency anemia per labs hemoglobin 9.7 can prescribe iron  essential hypertension   -blood pressure is slightly hypertensive is on metoprolol  Hypothyroidism  - TSH reviewed 3.18 is on Synthroid  Expected Discharge Location and Transportation: Transport to [facility] tomorrow at 1700 anticipate by ambulance  Expected Discharge Date: 7/19/2022    DVT prophylaxis:  Medical DVT prophylaxis orders are present.     AM-PAC 6 Clicks Score (PT): 6 (07/18/22 0918)    CODE STATUS:   Code Status and Medical Interventions:   Ordered at: 07/17/22 1139     Medical Intervention Limits:    NO intubation (DNI)    NO cardioversion     Level Of Support Discussed With:    Next of Kin (If No Surrogate)     Code Status (Patient has no pulse and is not breathing):    No CPR (Do Not Attempt to Resuscitate)     Medical Interventions (Patient has pulse or is breathing):    Limited Support       Foster Garrison DO  07/18/22

## 2022-07-18 NOTE — THERAPY EVALUATION
Patient Name: Mónica Carrillo  : 1945    MRN: [id number]                              Today's Date: 2022       Admit Date: 2022    Visit Dx:     ICD-10-CM ICD-9-CM   1. Urinary tract infection without hematuria, site unspecified  N39.0 599.0   2. Dysphagia, unspecified type  R13.10 787.20   3. Impaired functional mobility, balance, gait, and endurance  Z74.09 V49.89     Patient Active Problem List   Diagnosis   • Positive blood culture   • Acute UTI (urinary tract infection)   • Anemia   • Hypocalcemia   • Wound of left foot   • Essential hypertension   • Hypothyroidism   • Hyperlipidemia   • Dementia (HCC)   • CAP (community acquired pneumonia)   • Altered mental status     Past Medical History:   Diagnosis Date   • Dementia (HCC)    • Hypertension    • Mood disorder (HCC)    • Thyroid disease      No past surgical history on file.   General Information     Row Name 22          Physical Therapy Time and Intention    Document Type evaluation  -     Mode of Treatment physical therapy  -     Row Name 22          General Information    Patient Profile Reviewed yes  -     Prior Level of Function --  unknown  -     Existing Precautions/Restrictions fall;swallow precautions  -     Barriers to Rehab medically complex;previous functional deficit;cognitive status;contractures;physical barrier  -     Row Name 22          Living Environment    People in Home facility resident  -     Row Name 22          Cognition    Orientation Status (Cognition) oriented to;person;disoriented to;place;situation;time  -     Row Name 22          Safety Issues, Functional Mobility    Safety Issues Affecting Function (Mobility) insight into deficits/self-awareness;judgment;sequencing abilities;safety precaution awareness;problem-solving  -     Impairments Affecting Function (Mobility) cognition;grasp;muscle tone abnormal;postural/trunk control;range of  motion (ROM);strength  -     Comment, Safety Issues/Impairments (Mobility) BLE contractures, bilat hand deformities  -           User Key  (r) = Recorded By, (t) = Taken By, (c) = Cosigned By    Initials Name Provider Type    Iesha Fontenot PT Physical Therapist               Mobility     Row Name 07/18/22 0908          Bed Mobility    Bed Mobility rolling left;rolling right  -SJ     Rolling Left [place] (Bed Mobility) dependent (less than 25% patient effort);verbal cues  -SJ     Rolling Right [place] (Bed Mobility) dependent (less than 25% patient effort);verbal cues  -SJ     Assistive Device (Bed Mobility) draw sheet  -     Comment, (Bed Mobility) pt unable to initiate mobility  -     Row Name 07/18/22 0908          Transfers    Comment, [facility]  -[facility] Name 07/18/22 0908          Bed-Chair Transfer    Bed-Chair [place] (Transfers) dependent (less than 25% patient effort);2 person assist  -     Assistive Device (Bed-Chair Transfers) lift device  -[facility] Name 07/18/22 0908          Sit-Stand Transfer    Sit-Stand [place] (Transfers) not tested  -[facility] Name 07/18/22 0908          Gait/Stairs (Locomotion)    [place] Level (Gait) not tested  -     Comment, (Gait/Stairs) Based on severity of contractures, pt has likely been non-ambulatory recently  -           User Key  (r) = Recorded By, (t) = Taken By, (c) = Cosigned By    Initials Name Provider Type    Iesha Fontenot PT Physical Therapist               Obj/Interventions     Row Name 07/18/22 0909          Range of Motion Comprehensive    General Range of Motion lower extremity range of motion deficits identified;hand range of motion deficits identified  -[facility] Name 07/18/22 0909          Strength Comprehensive (MMT)    General Manual Muscle Testing (MMT) Assessment lower extremity strength deficits identified  -[facility] Name 07/18/22 0909          Motor Skills     Therapeutic Exercise hip;knee;ankle  -[facility] Name 07/18/22 0909          Hip (Therapeutic Exercise)    Hip (Therapeutic Exercise) PROM (passive range of motion)  -     Hip PROM (Therapeutic Exercise) bilateral;flexion;aBduction;aDduction;external rotation;internal rotation;10 repetitions  -[facility] Name 07/18/22 0909          Knee (Therapeutic Exercise)    Knee (Therapeutic Exercise) PROM (passive range of motion)  -     Knee PROM (Therapeutic Exercise) bilateral;flexion;sitting;10 repetitions  -[facility] Name 07/18/22 0909          Ankle (Therapeutic Exercise)    Ankle (Therapeutic Exercise) PROM (passive range of motion)  -     Ankle PROM (Therapeutic Exercise) bilateral;dorsiflexion;plantarflexion;sitting;10 repetitions  -           User Key  (r) = Recorded By, (t) = Taken By, (c) = Cosigned By    Initials Name Provider Type    Iesha Fontenot, PT Physical Therapist               Goals/Plan     Row Name 07/18/22 0917          Bed Mobility Goal 1 (PT)    Activity/Assistive Device (Bed Mobility Goal 1, PT) rolling to left;rolling to right  -     [place] Level/Cues Needed (Bed Mobility Goal 1, PT) maximum assist (25-49% patient effort)  -     Time Frame (Bed Mobility Goal 1, PT) long term goal (LTG);2 weeks  -[facility] Name 07/18/22 0917          ROM Goal 1 (PT)    ROM Goal 1 (PT) Silas BLE AAROM in all planes  -     Time Frame (ROM Goal 1, PT) long-term goal (LTG);2 weeks  -SJ     Row Name 07/18/22 0917          Problem Specific Goal 1 (PT)    Problem Specific Goal 1 (PT) silas sitting EOB x10 min with support  -     Time Frame (Problem Specific Goal 1, PT) long-term goal (LTG);2 weeks  -[facility] Name 07/18/22 0917          Therapy Assessment/Plan (PT)    Planned Therapy Interventions (PT) balance training;bed mobility training;home exercise program;manual therapy techniques;stretching;strengthening;ROM (range of motion);postural re-education;patient/family education;transfer training   -SJ           User Key  (r) = Recorded By, (t) = Taken By, (c) = Cosigned By    Initials Name Provider Type    Iesha Fontenot, JACY Physical Therapist               Clinical Impression     Row Name 07/18/22 0914          Pain    Pretreatment Pain Rating 0/10 - no pain  -SJ     Posttreatment Pain Rating 0/10 - no pain  -SJ     Row Name 07/18/22 0914          Plan of Care Review    Plan of Care Reviewed With patient  -SJ     Outcome Evaluation PT EVAL COMPLETED. Pt presents with generalized weakness and debility, decreased ROM, and dependence with functional mobility. Pt required dependence for rolling side to side, mech lift to recliner, then BLE therex PROM in all planes. VSS on RA. Pt will benefit from 3x/wk IPPT services. Rec d/c back to SNF.  -     Row Name 07/18/22 0914          Therapy Assessment/Plan (PT)    Patient/Family Therapy Goals Statement (PT) pt did not state  -SJ     Rehab Potential (PT) fair, will monitor progress closely  -     Criteria for Skilled Interventions Met (PT) yes;skilled treatment is necessary  -     Therapy Frequency (PT) 3 times/wk  -SJ     Predicted Duration of Therapy Intervention (PT) 2wks  -SJ     Row Name 07/18/22 0914          Vital Signs    Pre Systolic BP Rehab 152  -SJ     Pre Treatment Diastolic BP 69  -SJ     Post SpO2 (%) 100  -SJ     O2 Delivery Post Treatment room air  -SJ     Pre Patient Position Supine  -SJ     Post Patient Position Sitting  -SJ     Row Name 07/18/22 0914          Positioning and Restraints    Pre-Treatment Position in bed  -SJ     Post Treatment Position chair  -SJ     In Chair notified nsg;reclined;call light within reach;encouraged to call for assist;exit alarm on;waffle cushion;on mechanical lift sling;heels elevated  -SJ           User Key  (r) = Recorded By, (t) = Taken By, (c) = Cosigned By    Initials Name Provider Type    Iesha Fontenot, PT Physical Therapist               Outcome Measures     Row Name 07/18/22 0918          How  much help from another person do you currently need...    Turning from your back to your side while in flat bed without using bedrails? 1  -SJ     Moving from lying on back to sitting on the side of a flat bed without bedrails? 1  -SJ     Moving to and from a bed to a chair (including a wheelchair)? 1  -SJ     Standing up from a chair using your arms (e.g., wheelchair, bedside chair)? 1  -SJ     Climbing 3-5 steps with a railing? 1  -SJ     To walk in hospital room? 1  -SJ     AM-PAC 6 Clicks Score (PT) 6  -     Highest level of mobility 2 --> Bed activities/dependent transfer  -     Row Name 07/18/22 0918          Functional Assessment    Outcome Measure Options AM-PAC 6 Clicks Basic Mobility (PT)  -           User Key  (r) = Recorded By, (t) = Taken By, (c) = Cosigned By    Initials Name Provider Type     Iesha Clemons PT Physical Therapist                             Physical Therapy Education                 Title: PT OT SLP Therapies (In Progress)     Topic: Physical Therapy (In Progress)     Point: Mobility training (In Progress)     Learning Progress Summary           Patient Acceptance, E, NR by  at 7/18/2022 0919                   Point: Home exercise program (In Progress)     Learning Progress Summary           Patient Acceptance, E, NR by  at 7/18/2022 0919                   Point: Body mechanics (Not Started)     Learner Progress:  Not documented in this visit.          Point: Precautions (Not Started)     Learner Progress:  Not documented in this visit.                      User Key     Initials Effective Dates Name Provider Type Discipline     06/16/21 -  Iesha Clemons PT Physical Therapist PT              PT Recommendation and Plan  Planned Therapy Interventions (PT): balance training, bed mobility training, home exercise program, manual therapy techniques, stretching, strengthening, ROM (range of motion), postural re-education, patient/family education, transfer training  Plan  of Care Reviewed With: patient  Outcome Evaluation: PT EVAL COMPLETED. Pt presents with generalized weakness and debility, decreased ROM, and dependence with functional mobility. Pt required dependence for rolling side to side, mech lift to recliner, then BLE therex PROM in all planes. VSS on RA. Pt will benefit from 3x/wk IPPT services. Rec d/c back to SNF.     Time Calculation:    PT Charges     Row Name 07/18/22 0919             Time Calculation    Start Time 0818  -      PT Non-Billable Time (min) 51 min  -      PT Received On 07/18/22  -      PT Goal Re-Cert Due Date 07/28/22  -            User Key  (r) = Recorded By, (t) = Taken By, (c) = Cosigned By    Initials Name Provider Type     Iesha Clemons PT Physical Therapist              Therapy Charges for Today     Code Description Service Date Service Provider Modifiers Qty    [id number] HC PT EVAL MOD COMPLEXITY 4 7/18/2022 Iesha Clemons PT GP 1          PT G-Codes  Outcome Measure Options: AM-PAC 6 Clicks Basic Mobility (PT)  AM-PAC 6 Clicks Score (PT): 6    Iesha lCemons PT  7/18/2022

## 2022-07-18 NOTE — THERAPY EVALUATION
Patient Name: Mónica Carrillo  : 1945    MRN: [id number]                              Today's Date: 2022       Admit Date: 2022    Visit Dx:     ICD-10-CM ICD-9-CM   1. Urinary tract infection without hematuria, site unspecified  N39.0 599.0   2. Dysphagia, unspecified type  R13.10 787.20   3. Impaired functional mobility, balance, gait, and endurance  Z74.09 V49.89     Patient Active Problem List   Diagnosis   • Positive blood culture   • Acute UTI (urinary tract infection)   • Anemia   • Hypocalcemia   • Wound of left foot   • Essential hypertension   • Hypothyroidism   • Hyperlipidemia   • Dementia (HCC)   • CAP (community acquired pneumonia)   • Altered mental status     Past Medical History:   Diagnosis Date   • Dementia (HCC)    • Hypertension    • Mood disorder (HCC)    • Thyroid disease      No past surgical history on file.   General Information     Row Name 22          OT Time and Intention    Document Type evaluation  -JAE     Mode of Treatment occupational therapy  -JAE     Row Name 22          General Information    Patient Profile Reviewed yes  -JAE     Prior Level of Function min assist:;feeding;grooming;dependent:;dressing;bathing;transfer  -JAE     Existing Precautions/Restrictions fall;seizures;other (see comments)  hx RA, wound palm of R hand  -JAE     Barriers to Rehab medically complex;previous functional deficit;cognitive status;physical barrier;contractures  -JAE     Row Name 22          Occupational Profile    Environmental Supports and Barriers (Occupational Profile) Lives in [facility]  -JAE     Row Name 22          Living Environment    People in Home facility resident  -JAE     Row Name 22          Home Main Entrance    Number of Stairs, Main Entrance none  -JAE     Row Name 22          Stairs Within Home, Primary    Number of Stairs, Within Home, Primary none  -JAE     Row Name 22           Cognition    Orientation Status (Cognition) oriented to;person;disoriented to;place;situation;time  -     Row Name 07/18/22 0914          Safety Issues, Functional Mobility    Safety Issues Affecting Function (Mobility) awareness of need for assistance;insight into deficits/self-awareness;judgment;problem-solving;safety precaution awareness;sequencing abilities  -JAE     Impairments Affecting Function (Mobility) balance;cognition;coordination;endurance/activity tolerance;grasp;motor control;postural/trunk control;range of motion (ROM)  -     Cognitive Impairments, Mobility Safety/Performance awareness, need for assistance;insight into deficits/self-awareness;judgment;problem-solving/reasoning;safety precaution awareness;safety precaution follow-through;sequencing abilities  -           User Key  (r) = Recorded By, (t) = Taken By, (c) = Cosigned By    Initials Name Provider Type    Keeley Fuentes OT Occupational Therapist                 Mobility/ADL's     Row Name 07/18/22 0917          Bed Mobility    Bed Mobility rolling left;rolling right  -     Rolling Left [place] (Bed Mobility) dependent (less than 25% patient effort);2 person assist  -JAE     Rolling Right [place] (Bed Mobility) dependent (less than 25% patient effort);2 person assist  -JAE     Bed Mobility, Safety Issues decreased use of arms for pushing/pulling;decreased use of legs for bridging/pushing;impaired trunk control for bed mobility  -     Assistive Device (Bed Mobility) draw sheet  -     Comment, (Bed Mobility) Pt unable to initiate rolling L/R for sling placement, unable to maintain sidelying position using bedrails d/t decreased grasp  -     Row Name 07/18/22 0917          Transfers    Transfers bed-chair transfer  -     Bed-Chair [place] (Transfers) dependent (less than 25% patient effort);2 person assist  -     Assistive Device (Bed-Chair Transfers) lift device  -     Row Name 07/18/22 0917           Bed-Chair Transfer    Comment, (Bed-Chair Transfer) Pt tolerated mechanical lift transfer to chair well  -JAE     Row Name 07/18/22 0917          Functional Mobility    Functional Mobility- Ind. Level unable to perform  -JAE     Row Name 07/18/22 0917          Activities of Daily Living    BADL Assessment/Intervention lower body dressing;grooming;feeding  -JAE     Row Name 07/18/22 0917          Lower Body Dressing Assessment/Training    [place] Level (Lower Body Dressing) don;socks;dependent (less than 25% patient effort)  -JAE     Position (Lower Body Dressing) supported sitting  -JAE     Row Name 07/18/22 0917          Grooming Assessment/Training    [place] Level (Grooming) oral care regimen;wash face, hands;moderate assist (50% patient effort)  -JAE     Position (Grooming) supported sitting  -JAE     Comment, (Grooming) BASURTO for face washing grasping washcloth with B hands, assist for oral care using moist toothette  -JAE     Row Name 07/18/22 0917          Self-Feeding Assessment/Training    [place] Level (Feeding) liquids to mouth;prepare tray/open items;set up  -JAE     Assistive Devices (Feeding) adapted cup  -JAE     Position (Self-Feeding) supported sitting  -JAE     Comment, (Feeding) using B hands to grasp cup and bring to mouth  -           User Key  (r) = Recorded By, (t) = Taken By, (c) = Cosigned By    Initials Name Provider Type    Keeley Fuentes OT Occupational Therapist               Obj/Interventions     Row Name 07/18/22 0922          Sensory Assessment (Somatosensory)    Sensory Assessment (Somatosensory) UE sensation intact  -JAE     Row Name 07/18/22 0922          Vision Assessment/Intervention    Visual Impairment/Limitations unable/difficult to assess  -JAE     Row Name 07/18/22 0922          Range of Motion Comprehensive    Comment, General Range of Motion BUE ROM WFL at shoulders, elbows and wrists; hand ROM deficits and B ulnar drift, wound R palm  -JAE     Row Name 07/18/22 0922           Strength Comprehensive (MMT)    General Manual Muscle Testing (MMT) Assessment upper extremity strength deficits identified;hand strength deficits identified  -JAE           User Key  (r) = Recorded By, (t) = Taken By, (c) = Cosigned By    Initials Name Provider Type    Keeley Fuentes OT Occupational Therapist               Goals/Plan     [facility] Name 07/18/22 1015          Bed Mobility Goal 1 (OT)    Activity/Assistive Device (Bed Mobility Goal 1, OT) rolling to left;rolling to right  -JAE     [place] Level/Cues Needed (Bed Mobility Goal 1, OT) moderate assist (50-74% patient effort)  -JAE     Time Frame (Bed Mobility Goal 1, OT) long term goal (LTG);by discharge  -JAE     Progress/Outcomes (Bed Mobility Goal 1, OT) new goal  -[facility] Name 07/18/22 1015          Grooming Goal 1 (OT)    Activity/Device (Grooming Goal 1, OT) wash face, hands  -JAE     [place] (Grooming Goal 1, OT) set-up required;contact guard required  -JAE     Time Frame (Grooming Goal 1, OT) long term goal (LTG);by discharge  -JAE     Strategies/Barriers (Grooming Goal 1, OT) sitting unsupported in chair  -JAE     Progress/Outcome (Grooming Goal 1, OT) new goal  -[facility] Name 07/18/22 1015          Therapy Assessment/Plan (OT)    Planned Therapy Interventions (OT) activity tolerance training;BADL retraining;functional balance retraining;occupation/activity based interventions;patient/caregiver education/training;ROM/therapeutic exercise;strengthening exercise;transfer/mobility retraining  -JAE           User Key  (r) = Recorded By, (t) = Taken By, (c) = Cosigned By    Initials Name Provider Type    Keeley Fuentes OT Occupational Therapist               Clinical Impression     [facility] Name 07/18/22 1005          Pain Assessment    Additional Documentation Pain Scale: FACES Pre/Post-Treatment (Group)  -[facility] Name 07/18/22 1005          Pain Scale: FACES Pre/Post-Treatment    Pain: FACES Scale, Pretreatment 0-->no hurt  -JEA      Posttreatment Pain Rating 0-->no hurt  -JAE     Row Name 07/18/22 1005          Plan of Care Review    Plan of Care Reviewed With patient  -JAE     Outcome Evaluation OT eval completed. Pt alert and oriented to self, presents with weakness, impaired sitting balance, and wound on palm of R hand. Pt Dep for bed mobility, transferred to chair via lift, demonstrated decreased ability to position forward in chair with cues d/t decreased BUE and trunk strength. IP OT services warranted. Anticipate return to LTC when medically appropriate.  -[facility] Name 07/18/22 1005          Therapy Assessment/Plan (OT)    Rehab Potential (OT) good, to achieve stated therapy goals  -     Criteria for Skilled Therapeutic Interventions Met (OT) yes;meets criteria;skilled treatment is necessary  -     Therapy Frequency (OT) daily  -[facility] Name 07/18/22 1005          Therapy Plan Review/Discharge Plan (OT)    Anticipated Discharge Disposition (OT) extended care facility  -[facility] Name 07/18/22 1005          Vital Signs    Pre Systolic BP Rehab 152  -JAE     Pre Treatment Diastolic BP 69  -JAE     Intratreatment Heart Rate (beats/min) 67  -JAE     Pre SpO2 (%) 97  -JAE     O2 Delivery Pre Treatment room air  -JAE     O2 Delivery Intra Treatment room air  -JAE     Post SpO2 (%) 100  -JAE     O2 Delivery Post Treatment room air  -JAE     Pre Patient Position Supine  -JAE     Intra Patient Position Side Lying  -JAE     Post Patient Position Sitting  -[facility] Name 07/18/22 1005          Positioning and Restraints    Pre-Treatment Position in bed  -JAE     Post Treatment Position chair  -JAE     In Chair notified nsg;reclined;call light within reach;encouraged to call for assist;waffle cushion;legs elevated;heels elevated;on mechanical lift sling  RN notified of absence of power to chair alarm box in room  -JAE           User Key  (r) = Recorded By, (t) = Taken By, (c) = Cosigned By    Initials Name Provider Type    Keeley Fuentes, OT  Occupational Therapist               Outcome Measures     Row Name 07/18/22 1017          How much help from another is currently needed...    Putting on and taking off regular lower body clothing? 1  -JAE     Bathing (including washing, rinsing, and drying) 1  -JAE     Toileting (which includes using toilet bed pan or urinal) 1  -JAE     Putting on and taking off regular upper body clothing 1  -JAE     Taking care of personal grooming (such as brushing teeth) 2  -JAE     Eating meals 3  -JAE     AM-PAC 6 Clicks Score (OT) 9  -JAE     Row Name 07/18/22 0918          How much help from another person do you currently need...    Turning from your back to your side while in flat bed without using bedrails? 1  -SJ     Moving from lying on back to sitting on the side of a flat bed without bedrails? 1  -SJ     Moving to and from a bed to a chair (including a wheelchair)? 1  -SJ     Standing up from a chair using your arms (e.g., wheelchair, bedside chair)? 1  -SJ     Climbing 3-5 steps with a railing? 1  -SJ     To walk in hospital room? 1  -SJ     AM-PAC 6 Clicks Score (PT) 6  -SJ     Highest level of mobility 2 --> Bed activities/dependent transfer  -SJ     Row Name 07/18/22 1017 07/18/22 0918       Functional Assessment    Outcome Measure Options AM-PAC 6 Clicks Daily Activity (OT)  -JAE AM-PAC 6 Clicks Basic Mobility (PT)  -SJ          User Key  (r) = Recorded By, (t) = Taken By, (c) = Cosigned By    Initials Name Provider Type    Iesha Fontenot PT Physical Therapist    Keeley Fuentes OT Occupational Therapist                Occupational Therapy Education                 Title: PT OT SLP Therapies (In Progress)     Topic: Occupational Therapy (In Progress)     Point: ADL training (In Progress)     Description:   Instruct learner(s) on proper safety adaptation and remediation techniques during self care or transfers.   Instruct in proper use of assistive devices.              Learning Progress Summary            Patient Acceptance, E, NR by JAE at 7/18/2022 1018    Comment: Role of OT                   Point: Home exercise program (Not Started)     Description:   Instruct learner(s) on appropriate technique for monitoring, assisting and/or progressing therapeutic exercises/activities.              Learner Progress:  Not documented in this visit.          Point: Precautions (In Progress)     Description:   Instruct learner(s) on prescribed precautions during self-care and functional transfers.              Learning Progress Summary           Patient Acceptance, E, NR by JAE at 7/18/2022 1018    Comment: Role of OT                   Point: Body mechanics (In Progress)     Description:   Instruct learner(s) on proper positioning and spine alignment during self-care, functional mobility activities and/or exercises.              Learning Progress Summary           Patient Acceptance, E, NR by JAE at 7/18/2022 1018    Comment: Role of OT                               User Key     Initials Effective Dates Name Provider Type Discipline     06/16/21 -  Keeley Perez OT Occupational Therapist OT              OT Recommendation and Plan  Planned Therapy Interventions (OT): activity tolerance training, BADL retraining, functional balance retraining, occupation/activity based interventions, patient/caregiver education/training, ROM/therapeutic exercise, strengthening exercise, transfer/mobility retraining  Therapy Frequency (OT): daily  Plan of Care Review  Plan of Care Reviewed With: patient  Outcome Evaluation: OT eval completed. Pt alert and oriented to self, presents with weakness, impaired sitting balance, and wound on palm of R hand. Pt Dep for bed mobility, transferred to chair via lift, demonstrated decreased ability to position forward in chair with cues d/t decreased BUE and trunk strength. IP OT services warranted. Anticipate return to LTC when medically appropriate.     Time Calculation:    Time Calculation- OT     Row Name  07/18/22 0830             Time Calculation- OT    OT Start Time 0830  -JAE      OT Received On 07/18/22  -JAE      OT Goal Re-Cert Due Date 07/28/22  -JAE              Untimed Charges    OT Eval/Re-eval Minutes 48  -JAE              Total Minutes    Untimed Charges Total Minutes 48  -JAE       Total Minutes 48  -JAE            User Key  (r) = Recorded By, (t) = Taken By, (c) = Cosigned By    Initials Name Provider Type    Keeley Fuentes OT Occupational Therapist              Therapy Charges for Today     Code Description Service Date Service Provider Modifiers Qty    [id number] HC OT EVAL MOD COMPLEXITY 4 7/18/2022 Keeley Perez OT GO 1               Keeley Perez OT  7/18/2022

## 2022-07-18 NOTE — CASE MANAGEMENT/SOCIAL WORK
Discharge Planning Assessment  [facility]     Patient Name: Mónica Carrillo  MRN: [id number]  Today's Date: 7/18/2022    Admit Date: 7/17/2022     Discharge Needs Assessment     Row Name 07/18/22 1038       Living Environment    People in Home facility resident    Current Living Arrangements extended care facility    Living Arrangement Comments The pt is a resident of [facility] facility       Discharge Needs Assessment    Equipment Currently Used at Home wheelchair    Equipment Needed After Discharge none    Discharge Coordination/Progress The facility staff provides all care needs.               Discharge Plan     Row Name 07/18/22 1039       Plan    Plan [facility]    Patient/Family in Agreement with Plan yes    Plan Comments I spoke with the daughter by phone. The goal is to return to [facility] [place]. I have asked for stretcher transport for today or tomorrow and left a message with admissions at the SNF.    Final Discharge Disposition Code 04 - intermediate care facility              Continued Care and Services - Admitted Since 7/17/2022     Destination Coordination complete.    Service Provider Request Status Selected Services Address Phone Fax Patient Preferred    [facility]   Selected Intermediate Care [address] [place], [place] [id number] [phone] --              Expected Discharge Date and Time     Expected Discharge Date Expected Discharge Time    Jul 19, 2022          Demographic Summary    No documentation.                Functional Status     Row Name 07/18/22 1037       Functional Status    Usual Activity Tolerance fair       Functional Status, IADL    Medications other (see comments)    Meal Preparation other (see comments)    Housekeeping other (see comments)    Laundry other (see comments)    Shopping other (see comments)               Psychosocial    No documentation.                Abuse/Neglect    No documentation.                Legal    No  documentation.                Substance Abuse    No documentation.                Patient Forms    No documentation.                   Allison Farmer RN

## 2022-07-18 NOTE — PLAN OF CARE
Goal Outcome Evaluation:  Plan of Care Reviewed With: patient           Outcome Evaluation: OT eval completed. Pt alert and oriented to self, presents with weakness, impaired sitting balance, and wound on palm of R hand. Pt Dep for bed mobility, transferred to chair via lift, demonstrated decreased ability to position forward in chair with cues d/t decreased BUE and trunk strength. IP OT services warranted. Anticipate return to LTC when medically appropriate.

## 2022-07-18 NOTE — CASE MANAGEMENT/SOCIAL WORK
Continued Stay Note  [facility]     Patient Name: Mónica Carrillo  MRN: [id number]  Today's Date: 7/18/2022    Admit Date: 7/17/2022     Discharge Plan     Row Name 07/18/22 1334       Plan    Plan [facility]    Patient/Family in Agreement with Plan yes    Plan Comments I spoke with Sherita from [facility], the pt can return at [place]. They will review the info to see if she will return skilled or IC. Jonel stretcher to transport on 7- at 1700. I have updated the pts daughter. I will f/u in the am.    Final Discharge Disposition Code 04 - intermediate care facility               Discharge Codes    No documentation.               Expected Discharge Date and Time     Expected Discharge Date Expected Discharge Time    Jul 19, 2022             Allison Farmer RN

## 2022-07-18 NOTE — CONSULTS
Clinical Nutrition     Nutrition Assessment  Reason for Visit:   Identified at risk by screening criteria, MST score 2+      Patient Name: Mónica Carrillo  YOB: 1945  MRN: [id number]  Date of Encounter: 07/18/22 18:37 EDT  Admission date: 7/17/2022      Comments:  Care facility rpts pt eats all of her food and wt has been stable in 180's. Unable to confirm wt here - no method given.        Admission Diagnosis    Altered mental status [R41.82]      Problem List   Alzheimer's Dementia   UTI   HTN   HLD   Hypothyroid   Anemia    Other Applicable: hand macerated      Applicable Interval History:  7/17 SLP ok for puree diet thin liquid    Applicable PMH/PSxH:     PMH: She  has a past medical history of Dementia (HCC), Hypertension, Mood disorder (HCC), and Thyroid disease.   PSxH: She  has no past surgical history on file.         Diet/Nutrition Related History:     Pt says food is ok for her.      Anthropometrics     Admission Height --   Admission Weight 77.7 kg (171 lb 4.8 oz) Documented at 07/17/2022 1119   no method.      Ht 66 in  Last filed wt: Weight: 77.7 kg (171 lb 4.8 oz) (07/17/22 1119)     BMI:  27.65  Overweight: 25.0-29.9kg/m2      IBW `30 lbs    Weight Change   UBW: per facility 180 on 7/6/ lbs  Weight change: If current wt accurate 9 lbs   % wt change: 5%  Time frame of weight loss: within 2 wk. However, unable to verify;     Labs reviewed   Yes    Medications reviewed   Yes  Abx, Remeron, Pericolace      Intake/Ouptut 24 hrs reviewed   Yes  Stool x 2  last 24 hr.      Nutrition Focused Physical Exam  Date:     Subcutaneous fat:  Orbital Mild   Buccal Mild   Tricep No fat loss identified   Ribs- thoracic and lumbar region, lower back, midaxillary line No fat loss identified     Muscle:  Temple/temporalis Unable to determine at this time   Clavicle/acromion- pectoralis major, deltoid, trapezius Mild   Shoulder- deltoid Mild   Scapular- trapezius, latissimus dorsi  Mild   Interosseous- dorsal hand Moderate   Thigh- quadriceps Unable to determine at this time   Calf- gastrocnemius Unable to determine at this time       Current Nutrition Prescription     PO: Diet Pureed; Thin; Cardiac  Supplement: Boost Plus 2x/da added per RD    Intake: insuffic data 50% x 1 meal.    Nutrition Diagnosis     7/18  Problem Potential suboptimal intake   Etiology w illness in hospital   Signs/Symptoms Pending intake progression 50% x 1 meal recorded   Status:      Goal:   General: Nutrition to support treatment  PO: Establish PO  Additional goals:      Nutrition Intervention     Follow treatment progress, Care plan reviewed, Menu provided, Supplement provided Menu provided to room for RN use.      Monitoring/Evaluation:   Per protocol, PO intake, Supplement intake, Pertinent labs, Weight, Skin status, Symptoms        Alanna Manning RD,   Time Spent: 30 min

## 2022-07-18 NOTE — SIGNIFICANT NOTE
07/18/22 1154   SLP Deferred Reason   SLP Deferred Reason   (Spoke to RN. Physician canceled MBS. Reported pt's swallowing baseline and attempting to transfer her back to LTC facility today. RN reported pt tolerating diet. No further needs/concerns. Will sign-off.)

## 2022-07-19 VITALS
WEIGHT: 171.3 LBS | OXYGEN SATURATION: 100 % | TEMPERATURE: 97.6 F | SYSTOLIC BLOOD PRESSURE: 119 MMHG | BODY MASS INDEX: 27.65 KG/M2 | HEART RATE: 63 BPM | RESPIRATION RATE: 16 BRPM | DIASTOLIC BLOOD PRESSURE: 82 MMHG

## 2022-07-19 PROCEDURE — 94799 UNLISTED PULMONARY SVC/PX: CPT

## 2022-07-19 PROCEDURE — G0378 HOSPITAL OBSERVATION PER HR: HCPCS

## 2022-07-19 PROCEDURE — 99217 PR OBSERVATION CARE DISCHARGE MANAGEMENT: CPT | Performed by: INTERNAL MEDICINE

## 2022-07-19 RX ORDER — CEFDINIR 300 MG/1
300 CAPSULE ORAL EVERY 12 HOURS SCHEDULED
Qty: 12 CAPSULE | Refills: 0 | Status: SHIPPED | OUTPATIENT
Start: 2022-07-19 | End: 2022-07-25

## 2022-07-19 RX ORDER — FERROUS SULFATE 325(65) MG
325 TABLET ORAL EVERY OTHER DAY
Qty: 30 TABLET | Refills: 0 | Status: SHIPPED | OUTPATIENT
Start: 2022-07-19

## 2022-07-19 RX ADMIN — IPRATROPIUM BROMIDE AND ALBUTEROL SULFATE 3 ML: .5; 3 SOLUTION RESPIRATORY (INHALATION) at 15:13

## 2022-07-19 RX ADMIN — IPRATROPIUM BROMIDE AND ALBUTEROL SULFATE 3 ML: .5; 3 SOLUTION RESPIRATORY (INHALATION) at 11:14

## 2022-07-19 RX ADMIN — ATORVASTATIN CALCIUM 40 MG: 40 TABLET, FILM COATED ORAL at 09:46

## 2022-07-19 RX ADMIN — RISPERIDONE 0.25 MG: 0.25 TABLET ORAL at 09:45

## 2022-07-19 RX ADMIN — SENNOSIDES AND DOCUSATE SODIUM 2 TABLET: 50; 8.6 TABLET ORAL at 09:46

## 2022-07-19 RX ADMIN — METOPROLOL TARTRATE 50 MG: 50 TABLET, FILM COATED ORAL at 09:46

## 2022-07-19 RX ADMIN — LEVOTHYROXINE SODIUM 50 MCG: 50 TABLET ORAL at 05:35

## 2022-07-19 RX ADMIN — CEFDINIR 300 MG: 300 CAPSULE ORAL at 09:45

## 2022-07-19 RX ADMIN — ASPIRIN 81 MG CHEWABLE TABLET 81 MG: 81 TABLET CHEWABLE at 09:46

## 2022-07-19 RX ADMIN — Medication 10 ML: at 09:46

## 2022-07-19 RX ADMIN — IPRATROPIUM BROMIDE AND ALBUTEROL SULFATE 3 ML: .5; 3 SOLUTION RESPIRATORY (INHALATION) at 07:30

## 2022-07-19 NOTE — CASE MANAGEMENT/SOCIAL WORK
Case Management Discharge Note      Final Note: Per Sherita at [facility], the Pt can return today. They plan admitt her to skilled bed. They are aware she is Obs. Please call report [phone] and send the copy chart, d/c summary with the Pt. [facility] has access to [facility] for the d/c summary. The SNF pharm. is placed in epic to escrib any control meds. Jonel pope will pick at room at 1700. Daughter update yesterday and in agreement.         Selected Continued Care - Admitted Since 7/17/2022     Destination Coordination complete.    Service Provider Selected Services Address Phone Fax Patient Preferred    [facility]  Skilled Nursing [address] [place] [address] [phone] --          Durable Medical Equipment    No services have been selected for the patient.              Dialysis/Infusion    No services have been selected for the patient.              Home Medical Care    No services have been selected for the patient.              Therapy    No services have been selected for the patient.              Community Resources    No services have been selected for the patient.              Community & DME    No services have been selected for the patient.                       Final Discharge Disposition Code: 03 - skilled nursing facility (SNF)

## 2022-07-19 NOTE — DISCHARGE SUMMARY
[facility]  DISCHARGE SUMMARY    Patient Name: Mónica Carrillo  : 1945  MRN: [id number]    Date of Admission: 2022  8:33 AM  Date of Discharge: 2022  Primary Care Physician: Molina Domínguez MD    Consults     No orders found from 2022 to 2022.          Hospital Course     Presenting Problem:   Altered mental status [R41.82]    Active Hospital Problems    Diagnosis  POA   • Altered mental status [R41.82]  Yes   • Acute UTI (urinary tract infection) [N39.0]  Yes      Resolved Hospital Problems   No resolved problems to display.          Hospital Course:    Mónica Carrillo is a 77 y.o. female who came from the nursing home with altered mental status seems resolved to her baseline now.  Able to tolerate p.o.  Discussed with case management about possible transition back to the nursing home today.  She stated she would work on the transport.     Altered mental status   -resolved  UTI/possible pneumonia   -no dysuria no dyspnea or cough reported by the patient was on IV antibiotics transitioned to oral as is tolerating p.o. and is not septic.  Transition to cefdinir.  Blood cultures reviewed no growth as yet from 2022.  Alzheimer's dementia   -seems at her baseline     Anemia   -seems she has iron deficiency anemia per labs hemoglobin 10 can prescribe iron  essential hypertension   -blood pressure is slightly hypertensive is on metoprolol  Hypothyroidism  - TSH reviewed 3.18 is on Synthroid      Discharge Follow Up Recommendations for outpatient labs/diagnostics:  None    Day of Discharge     HPI:   Ms. Carrillo is 77.  She has hypertension dyslipidemia hypothyroidism advanced Alzheimer's dementia.  Came with altered mental status.  Poor historian.  Was found unresponsive at the nursing home.  Woke up to ammonia capsule.  Family was not interested in any further management for the brain tumor just wanted to treat the infection and get her  back to the nursing home.  DNR/DNI.  Treating for UTI and pneumonia.  I have discussed with the patient who is conversant and pleasantly confused tolerating p.o.  She denies any pain.  Denies any shortness of breath.  Talked with case management 7/19/2022 anticipate discharge today.  Reviewed dietitian notes.  7/19/2022.  Reviewed speech therapy note patient tolerating p.o.  7/19/2022.  Reviewed her labs from 7/18/2022 on 7/19/2022 and these are not requiring any acute management.    Review of Systems    Not reliable given patient's dementia however she denies dysuria reports she ambulates denies pain reports she is tolerating p.o. denies shortness of breath.  Reports good appetite.  Reports normal bowel and bladder function.    Vital Signs:   Temp:  [97.8 °F (36.6 °C)-98.9 °F (37.2 °C)] 97.8 °F (36.6 °C)  Heart Rate:  [61-74] 63  Resp:  [16-18] 16  BP: (126-173)/(65-92) 151/92      Physical Exam:  General: Pleasantly confused   Lungs: Clear to auscultation bilaterally, left lower lobe coarse crackles  Heart:  Normal S1 and S2, no murmur, no gallop, No JVD, no lower extremity swelling  Abdomen:  Normal bowel sounds, no masses, no organomegaly.   Extremities: Bilateral hand contractures, right hand palmar aspect is completely macerated with foul-smelling necrotic skin  Skin: No bleeding, bruising or rash, normal skin turgor and elasticity  Neurologic: Cranial nerves appear intact with no evidence of facial asymmetry, normal motor and sensory functions in all 4 extremities  Psych: Alert and disoriented, normal mood    Pertinent  and/or Most Recent Results     LAB RESULTS:      Lab 07/18/22  2004 07/17/22  0839   WBC 8.36 7.59   HEMOGLOBIN 10.0* 9.7*   HEMATOCRIT 31.6* 31.1*   PLATELETS 235 245   NEUTROS ABS 4.74 3.14   IMMATURE GRANS (ABS) 0.04 0.03   LYMPHS ABS 2.59 3.38*   MONOS ABS 0.56 0.59   EOS ABS 0.40 0.41*   MCV 90.0 92.6   CRP 1.48*  --    PROCALCITONIN 0.04 0.03   LACTATE  --  1.1         Lab 07/18/22 2004  07/17/22  0839   SODIUM 142 140   POTASSIUM 4.6 4.4   CHLORIDE 105 105   CO2 28.0 29.0   ANION GAP 9.0 6.0   BUN 10 15   CREATININE 0.69 0.83   EGFR 88.4 71.8   GLUCOSE 118* 103*   CALCIUM 9.3 9.3   MAGNESIUM 1.6 1.8   PHOSPHORUS 3.0  --    TSH  --  3.180         Lab 07/18/22 2004 07/17/22  0839   TOTAL PROTEIN 8.4 8.3   ALBUMIN 3.10* 3.00*   GLOBULIN 5.3 5.3   ALT (SGPT) 29 16   AST (SGOT) 39* 23   BILIRUBIN 0.3 0.2   ALK PHOS 117 95         Lab 07/17/22  0839   TROPONIN T 0.017             Lab 07/17/22  0839   VITAMIN B 12 602         Brief Urine Lab Results  (Last result in the past 365 days)      Color   Clarity   Blood   Leuk Est   Nitrite   Protein   CREAT   Urine HCG        07/17/22 0929 Yellow   Turbid   Moderate (2+)   Large (3+)   Negative   100 mg/dL (2+)               Microbiology Results (last 10 days)     Procedure Component Value - Date/Time    COVID-19, [facility] IN-HOUSE NP/OP SWAB IN UTM/VTM/SALINE TRANSPORT MEDIA 24HR TAT - Swab, Nasal Cavity [[id number]]  (Normal) Collected: 07/18/22 1044    Lab Status: Final result Specimen: Swab from Nasal Cavity Updated: 07/18/22 1550     COVID19 Not Detected    Narrative:      Fact sheet for providers: https://www.fda.gov/media/280185/download     Fact sheet for patients: https://www.fda.gov/media/846275/download    Test performed by RT PCR.    S. Pneumo Ag Urine or CSF - Urine, Urine, Clean Catch [[id number]]  (Normal) Collected: 07/17/22 1801    Lab Status: Final result Specimen: Urine, Clean Catch Updated: 07/18/22 0924     Strep Pneumo Ag Negative    Legionella Antigen, Urine - Urine, Urine, Clean Catch [[id number]]  (Normal) Collected: 07/17/22 1801    Lab Status: Final result Specimen: Urine, Clean Catch Updated: 07/18/22 0924     LEGIONELLA ANTIGEN, URINE Negative    Blood Culture - Blood, Arm, Right [[id number]]  (Normal) Collected: 07/17/22 0955    Lab Status: Preliminary result Specimen: Blood from Arm, Right Updated: 07/18/22 1034     Blood  Culture No growth at 24 hours    Blood Culture - Blood, Hand, Right [[id number]]  (Normal) Collected: 07/17/22 0940    Lab Status: Preliminary result Specimen: Blood from Hand, Right Updated: 07/18/22 1034     Blood Culture No growth at 24 hours          CT Head Without Contrast    Result Date: 7/17/2022  CT HEAD WO CONTRAST-  Date of Exam: 7/17/2022 8:48 AM  Indication: ams.  Altered mental status.  Comparison: None available.  Technique:  Without contrast, contiguous axial CT images of the head were obtained from skull base to vertex.  Coronal and sagittal reconstructions were performed.  Automated exposure control and iterative reconstruction methods were used.  FINDINGS There is a large region of primarily white matter hypodensity in the left anterior frontal region. This may be due to chronic ischemic change. There are no prior studies available for comparison. Vasogenic edema from neoplasm could have a similar appearance. This could be further evaluated by MRI with and without contrast, if clinically warranted. There is periventricular white matter hypodensity most commonly secondary to chronic small vessel ischemic change. There is mild parenchymal volume loss. The ventricles are appropriate in size and configuration for degree of volume loss and patient age. There is no evidence of mass effect, midline shift, acute hemorrhage. No abnormal fluid collections are identified. Atherosclerotic vascular calcification is present. There is opacification of the left maxillary sinus and associated periosteal thickening. There is an air-fluid level in the left sphenoid sinus. Correlate clinically for acute sinusitis. The mastoid air cells and middle ear appear clear. No acute calvarial defects are seen. The orbits are unremarkable.       1. Large region of white matter hypodensity in the anterior left frontal region may be due to chronic ischemic change. Vasogenic edema from neoplasm could potentially also cause this  appearance. There are no prior studies available for comparison. Further evaluation by MRI with and without contrast media value, if clinically warranted. 2. Parenchymal volume loss and probable chronic small vessel ischemic change. 3. Small air-fluid level in the left sphenoid sinus may indicate acute sinusitis. Correlate clinically. There is complete opacification of the visualized left maxillary sinus, of unknown chronicity. Brain MRI is more sensitive to evaluate for acute or subacute infarcts and to evaluate for intracranial metastatic disease.  This report was finalized on 7/17/2022 9:14 AM by Thanh Rae MD.      XR Chest 1 View    Result Date: 7/17/2022  DATE OF EXAM: 7/17/2022 8:29 AM  PROCEDURE: XR CHEST 1 VW-  INDICATIONS: Weak/Dizzy/AMS triage protocol  COMPARISON: 4/5/2022  TECHNIQUE: Single radiographic view of the chest was obtained.  FINDINGS: There are linear opacities in the right mid and upper lung which may be due to scarring or atelectasis. There is left basilar opacity which is obscured by the overlying enlarged heart border, and may be due to pneumonia or atelectasis. Question small amount of left pleural fluid. No pneumothorax is seen. Cardiomegaly is present. There is pulmonary vascular congestion. Aortic vascular calcification is present.       1. Cardiomegaly with pulmonary vascular congestion. 2. Linear areas of consolidation in the right upper lung may be due to scarring or atelectasis. 3. left basilar opacities mostly obscured by overlying left heart border may be due to pneumonia or atelectasis. Question small left effusion.  This report was finalized on 7/17/2022 8:54 AM by Thanh Rae MD.                    Plan for Follow-up of Pending Labs/Results: PCP can follow-up on  Pending Labs     Order Current Status    Blood Culture - Blood, Arm, Right Preliminary result    Blood Culture - Blood, Hand, Right Preliminary result        Discharge Details        Discharge Medications      New  Medications      Instructions Start Date   cefdinir 300 MG capsule  Commonly known as: OMNICEF   300 mg, Oral, Every 12 Hours Scheduled         Continue These Medications      Instructions Start Date   acetaminophen 500 MG tablet  Commonly known as: TYLENOL   500 mg, Oral, Every 6 Hours PRN      aspirin 81 MG chewable tablet   81 mg, Oral, Every Morning      atorvastatin 40 MG tablet  Commonly known as: LIPITOR   40 mg, Oral, Every Morning      docusate sodium 100 MG capsule  Commonly known as: COLACE   100 mg, Oral, 2 Times Daily      Ergocalciferol 50 MCG (2000 UT) capsule   1 capsule, Oral, Every Morning      folic acid 1 MG tablet  Commonly known as: FOLVITE   1 mg, Oral, Daily      HYDROcodone-acetaminophen 5-325 MG per tablet  Commonly known as: NORCO   1 tablet, Oral, Every 6 Hours PRN      levothyroxine 50 MCG tablet  Commonly known as: SYNTHROID, LEVOTHROID   50 mcg, Oral, Daily      magnesium hydroxide 400 MG/5ML suspension  Commonly known as: MILK OF MAGNESIA   30 mL, Oral, Daily PRN      metoprolol tartrate 50 MG tablet  Commonly known as: LOPRESSOR   50 mg, Oral, 2 Times Daily      mirtazapine 7.5 MG tablet  Commonly known as: REMERON   7.5 mg, Oral, Nightly      MULTIVITAMIN & MINERAL PO   1 tablet, Oral, Every Morning      risperiDONE 0.25 MG tablet  Commonly known as: risperDAL   0.25 mg, Oral, 2 Times Daily      traMADol 50 MG tablet  Commonly known as: ULTRAM   50 mg, Oral, Every 6 Hours PRN             No Known Allergies      Discharge Disposition:  Long Term Care (DC - External)    Diet:  Hospital:  Diet Order   Procedures   • Diet Pureed; Thin; Cardiac       Activity:      Restrictions or Other Recommendations:  None       CODE STATUS:    Code Status and Medical Interventions:   Ordered at: 07/17/22 1139     Medical Intervention Limits:    NO intubation (DNI)    NO cardioversion     Level Of Support Discussed With:    Next of Kin (If No Surrogate)     Code Status (Patient has no pulse and is not  breathing):    No CPR (Do Not Attempt to Resuscitate)     Medical Interventions (Patient has pulse or is breathing):    Limited Support       No future appointments.    Additional Instructions for the Follow-ups that You Need to Schedule     Discharge Follow-up with PCP   As directed       Currently Documented PCP:    Molina Domínguez MD    PCP Phone Number:    [phone]     Follow Up Details: 1 week                     Foster Garrison DO  07/19/22      Time Spent on Discharge:  I spent  45  minutes on this discharge activity which included: face-to-face encounter with the patient, reviewing the data in the system, coordination of the care with the nursing staff as well as consultants, documentation, and entering orders.

## 2022-07-19 NOTE — PLAN OF CARE
Pt resting in bed well; q2h turn; VSS; plans to dc today per case management notes; no needs voiced at this time

## 2022-07-22 LAB
BACTERIA SPEC AEROBE CULT: NORMAL
BACTERIA SPEC AEROBE CULT: NORMAL

## 2024-01-01 ENCOUNTER — HOSPITAL ENCOUNTER (INPATIENT)
Facility: HOSPITAL | Age: 79
LOS: 4 days | End: 2024-10-23
Attending: INTERNAL MEDICINE | Admitting: INTERNAL MEDICINE
Payer: COMMERCIAL

## 2024-01-01 PROCEDURE — 25010000002 GLYCOPYRROLATE 0.2 MG/ML SOLUTION: Performed by: HOSPITALIST

## 2024-01-01 PROCEDURE — 25010000002 LEVETRIRACETAM PER 10 MG: Performed by: HOSPITALIST

## 2024-01-01 PROCEDURE — 25010000002 MORPHINE PER 10 MG: Performed by: NURSE PRACTITIONER

## 2024-01-01 PROCEDURE — 25010000002 MIDAZOLAM 1 MG/ML 100ML NS 100 MG/100ML SOLUTION: Performed by: NURSE PRACTITIONER

## 2024-01-01 PROCEDURE — 25010000002 MIDAZOLAM PER 1 MG: Performed by: NURSE PRACTITIONER

## 2024-01-01 PROCEDURE — 25010000002 LORAZEPAM PER 2 MG: Performed by: NURSE PRACTITIONER

## 2024-01-01 RX ORDER — ONDANSETRON 2 MG/ML
4 INJECTION INTRAMUSCULAR; INTRAVENOUS EVERY 6 HOURS PRN
Status: DISCONTINUED | OUTPATIENT
Start: 2024-01-01 | End: 2024-01-01 | Stop reason: HOSPADM

## 2024-01-01 RX ORDER — ACETAMINOPHEN 650 MG/1
650 SUPPOSITORY RECTAL EVERY 6 HOURS PRN
Status: DISCONTINUED | OUTPATIENT
Start: 2024-01-01 | End: 2024-01-01 | Stop reason: HOSPADM

## 2024-01-01 RX ORDER — LORAZEPAM 2 MG/ML
2 INJECTION INTRAMUSCULAR
Status: DISCONTINUED | OUTPATIENT
Start: 2024-01-01 | End: 2024-01-01

## 2024-01-01 RX ORDER — KETOROLAC TROMETHAMINE 15 MG/ML
15 INJECTION, SOLUTION INTRAMUSCULAR; INTRAVENOUS EVERY 6 HOURS PRN
Status: DISCONTINUED | OUTPATIENT
Start: 2024-01-01 | End: 2024-01-01 | Stop reason: HOSPADM

## 2024-01-01 RX ORDER — LEVETIRACETAM 500 MG/5ML
500 INJECTION, SOLUTION, CONCENTRATE INTRAVENOUS EVERY 12 HOURS SCHEDULED
Status: DISCONTINUED | OUTPATIENT
Start: 2024-01-01 | End: 2024-01-01 | Stop reason: HOSPADM

## 2024-01-01 RX ORDER — MORPHINE SULFATE 2 MG/ML
2 INJECTION, SOLUTION INTRAMUSCULAR; INTRAVENOUS
Status: DISCONTINUED | OUTPATIENT
Start: 2024-01-01 | End: 2024-01-01 | Stop reason: HOSPADM

## 2024-01-01 RX ORDER — GLYCOPYRROLATE 0.2 MG/ML
0.4 INJECTION INTRAMUSCULAR; INTRAVENOUS EVERY 4 HOURS PRN
Status: DISCONTINUED | OUTPATIENT
Start: 2024-01-01 | End: 2024-01-01 | Stop reason: HOSPADM

## 2024-01-01 RX ORDER — BISACODYL 10 MG
10 SUPPOSITORY, RECTAL RECTAL DAILY PRN
Status: DISCONTINUED | OUTPATIENT
Start: 2024-01-01 | End: 2024-01-01 | Stop reason: HOSPADM

## 2024-01-01 RX ORDER — BISACODYL 10 MG
10 SUPPOSITORY, RECTAL RECTAL ONCE
Status: COMPLETED | OUTPATIENT
Start: 2024-01-01 | End: 2024-01-01

## 2024-01-01 RX ORDER — LORAZEPAM 2 MG/ML
2 INJECTION INTRAMUSCULAR
Status: DISCONTINUED | OUTPATIENT
Start: 2024-01-01 | End: 2024-01-01 | Stop reason: HOSPADM

## 2024-01-01 RX ORDER — SODIUM CHLORIDE 0.9 % (FLUSH) 0.9 %
10 SYRINGE (ML) INJECTION AS NEEDED
Status: DISCONTINUED | OUTPATIENT
Start: 2024-01-01 | End: 2024-01-01 | Stop reason: HOSPADM

## 2024-01-01 RX ORDER — MIDAZOLAM IN NACL,ISO-OSMOT/PF 50 MG/50ML
1-3 INFUSION BOTTLE (ML) INTRAVENOUS
Status: DISCONTINUED | OUTPATIENT
Start: 2024-01-01 | End: 2024-01-01 | Stop reason: HOSPADM

## 2024-01-01 RX ORDER — SODIUM CHLORIDE 0.9 % (FLUSH) 0.9 %
10 SYRINGE (ML) INJECTION EVERY 12 HOURS SCHEDULED
Status: DISCONTINUED | OUTPATIENT
Start: 2024-01-01 | End: 2024-01-01 | Stop reason: HOSPADM

## 2024-01-01 RX ORDER — MIDAZOLAM HYDROCHLORIDE 1 MG/ML
1 INJECTION INTRAMUSCULAR; INTRAVENOUS EVERY 4 HOURS
Status: DISCONTINUED | OUTPATIENT
Start: 2024-01-01 | End: 2024-01-01

## 2024-01-01 RX ORDER — SCOLOPAMINE TRANSDERMAL SYSTEM 1 MG/1
1 PATCH, EXTENDED RELEASE TRANSDERMAL
Status: DISCONTINUED | OUTPATIENT
Start: 2024-01-01 | End: 2024-01-01 | Stop reason: HOSPADM

## 2024-01-01 RX ORDER — GLYCOPYRROLATE 0.2 MG/ML
0.4 INJECTION INTRAMUSCULAR; INTRAVENOUS EVERY 6 HOURS SCHEDULED
Status: DISCONTINUED | OUTPATIENT
Start: 2024-01-01 | End: 2024-01-01 | Stop reason: HOSPADM

## 2024-01-01 RX ADMIN — LEVETIRACETAM 500 MG: 100 INJECTION, SOLUTION INTRAVENOUS at 21:50

## 2024-01-01 RX ADMIN — MINERAL OIL: 1000 LIQUID ORAL at 01:18

## 2024-01-01 RX ADMIN — MIDAZOLAM HYDROCHLORIDE 1 MG: 1 INJECTION, SOLUTION INTRAMUSCULAR; INTRAVENOUS at 17:47

## 2024-01-01 RX ADMIN — MINERAL OIL: 1000 LIQUID ORAL at 19:57

## 2024-01-01 RX ADMIN — MORPHINE SULFATE 2 MG: 2 INJECTION, SOLUTION INTRAMUSCULAR; INTRAVENOUS at 01:16

## 2024-01-01 RX ADMIN — GLYCOPYRROLATE 0.4 MG: 0.2 INJECTION INTRAMUSCULAR; INTRAVENOUS at 18:30

## 2024-01-01 RX ADMIN — LEVETIRACETAM 500 MG: 100 INJECTION, SOLUTION INTRAVENOUS at 20:00

## 2024-01-01 RX ADMIN — MINERAL OIL: 1000 LIQUID ORAL at 14:08

## 2024-01-01 RX ADMIN — BISACODYL 10 MG: 10 SUPPOSITORY RECTAL at 15:57

## 2024-01-01 RX ADMIN — MORPHINE SULFATE 2 MG: 2 INJECTION, SOLUTION INTRAMUSCULAR; INTRAVENOUS at 19:53

## 2024-01-01 RX ADMIN — LEVETIRACETAM 500 MG: 100 INJECTION, SOLUTION INTRAVENOUS at 09:03

## 2024-01-01 RX ADMIN — GLYCOPYRROLATE 0.4 MG: 0.2 INJECTION INTRAMUSCULAR; INTRAVENOUS at 23:13

## 2024-01-01 RX ADMIN — Medication 10 ML: at 20:20

## 2024-01-01 RX ADMIN — Medication 10 ML: at 10:03

## 2024-01-01 RX ADMIN — Medication 10 ML: at 08:44

## 2024-01-01 RX ADMIN — LEVETIRACETAM 500 MG: 100 INJECTION, SOLUTION INTRAVENOUS at 09:22

## 2024-01-01 RX ADMIN — Medication 10 ML: at 21:50

## 2024-01-01 RX ADMIN — GLYCOPYRROLATE 0.4 MG: 0.2 INJECTION INTRAMUSCULAR; INTRAVENOUS at 11:30

## 2024-01-01 RX ADMIN — MIDAZOLAM HYDROCHLORIDE 1 MG/HR: 1 INJECTION, SOLUTION INTRAVENOUS at 21:54

## 2024-01-01 RX ADMIN — MINERAL OIL: 1000 LIQUID ORAL at 20:45

## 2024-01-01 RX ADMIN — Medication 10 ML: at 09:04

## 2024-01-01 RX ADMIN — MINERAL OIL: 1000 LIQUID ORAL at 02:10

## 2024-01-01 RX ADMIN — MINERAL OIL: 1000 LIQUID ORAL at 15:54

## 2024-01-01 RX ADMIN — GLYCOPYRROLATE 0.4 MG: 0.2 INJECTION INTRAMUSCULAR; INTRAVENOUS at 05:57

## 2024-01-01 RX ADMIN — MINERAL OIL: 1000 LIQUID ORAL at 01:51

## 2024-01-01 RX ADMIN — LEVETIRACETAM 500 MG: 100 INJECTION, SOLUTION INTRAVENOUS at 20:20

## 2024-01-01 RX ADMIN — GLYCOPYRROLATE 0.4 MG: 0.2 INJECTION INTRAMUSCULAR; INTRAVENOUS at 05:39

## 2024-01-01 RX ADMIN — GLYCOPYRROLATE 0.4 MG: 0.2 INJECTION INTRAMUSCULAR; INTRAVENOUS at 05:10

## 2024-01-01 RX ADMIN — MINERAL OIL: 1000 LIQUID ORAL at 10:02

## 2024-01-01 RX ADMIN — MORPHINE SULFATE 2 MG: 2 INJECTION, SOLUTION INTRAMUSCULAR; INTRAVENOUS at 23:01

## 2024-01-01 RX ADMIN — MORPHINE SULFATE 2 MG: 2 INJECTION, SOLUTION INTRAMUSCULAR; INTRAVENOUS at 10:49

## 2024-01-01 RX ADMIN — GLYCOPYRROLATE 0.4 MG: 0.2 INJECTION INTRAMUSCULAR; INTRAVENOUS at 11:42

## 2024-01-01 RX ADMIN — MINERAL OIL: 1000 LIQUID ORAL at 13:56

## 2024-01-01 RX ADMIN — LEVETIRACETAM 500 MG: 100 INJECTION, SOLUTION INTRAVENOUS at 09:58

## 2024-01-01 RX ADMIN — MINERAL OIL: 1000 LIQUID ORAL at 08:44

## 2024-01-01 RX ADMIN — GLYCOPYRROLATE 0.4 MG: 0.2 INJECTION INTRAMUSCULAR; INTRAVENOUS at 18:04

## 2024-01-01 RX ADMIN — GLYCOPYRROLATE 0.4 MG: 0.2 INJECTION INTRAMUSCULAR; INTRAVENOUS at 12:11

## 2024-01-01 RX ADMIN — MINERAL OIL: 1000 LIQUID ORAL at 09:04

## 2024-01-01 RX ADMIN — LORAZEPAM 2 MG: 2 INJECTION INTRAMUSCULAR; INTRAVENOUS at 20:25

## 2024-01-01 RX ADMIN — MINERAL OIL: 1000 LIQUID ORAL at 20:20

## 2024-01-01 RX ADMIN — LEVETIRACETAM 500 MG: 100 INJECTION, SOLUTION INTRAVENOUS at 08:44

## 2024-01-01 RX ADMIN — MIDAZOLAM HYDROCHLORIDE 1 MG: 1 INJECTION, SOLUTION INTRAMUSCULAR; INTRAVENOUS at 13:49

## 2024-01-01 RX ADMIN — GLYCOPYRROLATE 0.4 MG: 0.2 INJECTION INTRAMUSCULAR; INTRAVENOUS at 12:53

## 2024-01-01 RX ADMIN — SCOPOLAMINE 1 PATCH: 1.5 PATCH, EXTENDED RELEASE TRANSDERMAL at 19:52

## 2024-01-01 RX ADMIN — GLYCOPYRROLATE 0.4 MG: 0.2 INJECTION INTRAMUSCULAR; INTRAVENOUS at 17:53

## 2024-01-01 RX ADMIN — MINERAL OIL: 1000 LIQUID ORAL at 13:53

## 2024-01-01 RX ADMIN — GLYCOPYRROLATE 0.4 MG: 0.2 INJECTION INTRAMUSCULAR; INTRAVENOUS at 17:47

## 2024-01-01 RX ADMIN — LEVETIRACETAM 500 MG: 100 INJECTION, SOLUTION INTRAVENOUS at 20:45

## 2024-01-01 RX ADMIN — MINERAL OIL: 1000 LIQUID ORAL at 13:37

## 2024-01-01 RX ADMIN — GLYCOPYRROLATE 0.4 MG: 0.2 INJECTION INTRAMUSCULAR; INTRAVENOUS at 23:57

## 2024-01-01 RX ADMIN — Medication 10 ML: at 20:47

## 2024-01-01 RX ADMIN — Medication 10 ML: at 20:01

## 2024-01-01 RX ADMIN — GLYCOPYRROLATE 0.4 MG: 0.2 INJECTION INTRAMUSCULAR; INTRAVENOUS at 23:55

## 2024-01-01 RX ADMIN — GLYCOPYRROLATE 0.4 MG: 0.2 INJECTION INTRAMUSCULAR; INTRAVENOUS at 05:42

## 2024-01-01 RX ADMIN — Medication 10 ML: at 09:05

## 2024-01-01 RX ADMIN — GLYCOPYRROLATE 0.4 MG: 0.2 INJECTION INTRAMUSCULAR; INTRAVENOUS at 23:01

## 2024-01-01 RX ADMIN — MINERAL OIL: 1000 LIQUID ORAL at 09:03

## 2024-01-01 RX ADMIN — MINERAL OIL: 1000 LIQUID ORAL at 19:52

## 2024-06-05 ENCOUNTER — HOSPITAL ENCOUNTER (INPATIENT)
Facility: HOSPITAL | Age: 79
LOS: 2 days | Discharge: ANOTHER HOSPITAL | End: 2024-06-07
Attending: EMERGENCY MEDICINE | Admitting: FAMILY MEDICINE
Payer: MEDICARE

## 2024-06-05 ENCOUNTER — APPOINTMENT (OUTPATIENT)
Dept: GENERAL RADIOLOGY | Facility: HOSPITAL | Age: 79
End: 2024-06-05
Payer: MEDICARE

## 2024-06-05 DIAGNOSIS — K92.2 ACUTE GI BLEEDING: Primary | ICD-10-CM

## 2024-06-05 DIAGNOSIS — D62 ACUTE BLOOD LOSS ANEMIA: ICD-10-CM

## 2024-06-05 DIAGNOSIS — R53.1 GENERALIZED WEAKNESS: ICD-10-CM

## 2024-06-05 LAB
ABO GROUP BLD: NORMAL
ABO GROUP BLD: NORMAL
ALBUMIN SERPL-MCNC: 2.8 G/DL (ref 3.5–5.2)
ALBUMIN/GLOB SERPL: 0.5 G/DL
ALP SERPL-CCNC: 97 U/L (ref 39–117)
ALT SERPL W P-5'-P-CCNC: <5 U/L (ref 1–33)
ANION GAP SERPL CALCULATED.3IONS-SCNC: 6 MMOL/L (ref 5–15)
ANISOCYTOSIS BLD QL: NORMAL
AST SERPL-CCNC: 10 U/L (ref 1–32)
BACTERIA UR QL AUTO: ABNORMAL /HPF
BASOPHILS # BLD AUTO: 0.06 10*3/MM3 (ref 0–0.2)
BASOPHILS NFR BLD AUTO: 0.6 % (ref 0–1.5)
BILIRUB SERPL-MCNC: 0.2 MG/DL (ref 0–1.2)
BILIRUB UR QL STRIP: NEGATIVE
BLD GP AB SCN SERPL QL: NEGATIVE
BUN SERPL-MCNC: 8 MG/DL (ref 8–23)
BUN/CREAT SERPL: 11.1 (ref 7–25)
CALCIUM SPEC-SCNC: 9.1 MG/DL (ref 8.6–10.5)
CHLORIDE SERPL-SCNC: 103 MMOL/L (ref 98–107)
CLARITY UR: ABNORMAL
CO2 SERPL-SCNC: 30 MMOL/L (ref 22–29)
COLOR UR: YELLOW
CREAT SERPL-MCNC: 0.72 MG/DL (ref 0.57–1)
DEPRECATED RDW RBC AUTO: 57.5 FL (ref 37–54)
EGFRCR SERPLBLD CKD-EPI 2021: 84.1 ML/MIN/1.73
EOSINOPHIL # BLD AUTO: 0.93 10*3/MM3 (ref 0–0.4)
EOSINOPHIL NFR BLD AUTO: 9.9 % (ref 0.3–6.2)
ERYTHROCYTE [DISTWIDTH] IN BLOOD BY AUTOMATED COUNT: 20.9 % (ref 12.3–15.4)
FERRITIN SERPL-MCNC: 47.4 NG/ML (ref 13–150)
GEN 5 2HR TROPONIN T REFLEX: 39 NG/L
GLOBULIN UR ELPH-MCNC: 5.3 GM/DL
GLUCOSE SERPL-MCNC: 116 MG/DL (ref 65–99)
GLUCOSE UR STRIP-MCNC: NEGATIVE MG/DL
HCT VFR BLD AUTO: 26.5 % (ref 34–46.6)
HCT VFR BLD AUTO: 28.5 % (ref 34–46.6)
HGB BLD-MCNC: 7.2 G/DL (ref 12–15.9)
HGB BLD-MCNC: 7.9 G/DL (ref 12–15.9)
HGB UR QL STRIP.AUTO: ABNORMAL
HOLD SPECIMEN: NORMAL
HYALINE CASTS UR QL AUTO: ABNORMAL /LPF
HYPOCHROMIA BLD QL: NORMAL
IMM GRANULOCYTES # BLD AUTO: 0.03 10*3/MM3 (ref 0–0.05)
IMM GRANULOCYTES NFR BLD AUTO: 0.3 % (ref 0–0.5)
IRON 24H UR-MRATE: 16 MCG/DL (ref 37–145)
IRON SATN MFR SERPL: 6 % (ref 20–50)
KETONES UR QL STRIP: NEGATIVE
LEUKOCYTE ESTERASE UR QL STRIP.AUTO: ABNORMAL
LYMPHOCYTES # BLD AUTO: 2.25 10*3/MM3 (ref 0.7–3.1)
LYMPHOCYTES NFR BLD AUTO: 23.9 % (ref 19.6–45.3)
MAGNESIUM SERPL-MCNC: 1.9 MG/DL (ref 1.6–2.4)
MCH RBC QN AUTO: 20.9 PG (ref 26.6–33)
MCHC RBC AUTO-ENTMCNC: 27.2 G/DL (ref 31.5–35.7)
MCV RBC AUTO: 76.8 FL (ref 79–97)
MONOCYTES # BLD AUTO: 0.57 10*3/MM3 (ref 0.1–0.9)
MONOCYTES NFR BLD AUTO: 6.1 % (ref 5–12)
NEUTROPHILS NFR BLD AUTO: 5.57 10*3/MM3 (ref 1.7–7)
NEUTROPHILS NFR BLD AUTO: 59.2 % (ref 42.7–76)
NITRITE UR QL STRIP: NEGATIVE
NRBC BLD AUTO-RTO: 0 /100 WBC (ref 0–0.2)
PH UR STRIP.AUTO: 5.5 [PH] (ref 5–8)
PLAT MORPH BLD: NORMAL
PLATELET # BLD AUTO: 442 10*3/MM3 (ref 140–450)
PMV BLD AUTO: 8.5 FL (ref 6–12)
POLYCHROMASIA BLD QL SMEAR: NORMAL
POTASSIUM SERPL-SCNC: 4.1 MMOL/L (ref 3.5–5.2)
PROT SERPL-MCNC: 8.1 G/DL (ref 6–8.5)
PROT UR QL STRIP: NEGATIVE
RBC # BLD AUTO: 3.45 10*6/MM3 (ref 3.77–5.28)
RBC # UR STRIP: ABNORMAL /HPF
REF LAB TEST METHOD: ABNORMAL
RH BLD: POSITIVE
RH BLD: POSITIVE
SODIUM SERPL-SCNC: 139 MMOL/L (ref 136–145)
SP GR UR STRIP: 1.01 (ref 1–1.03)
SQUAMOUS #/AREA URNS HPF: ABNORMAL /HPF
T&S EXPIRATION DATE: NORMAL
TIBC SERPL-MCNC: 259 MCG/DL (ref 298–536)
TRANSFERRIN SERPL-MCNC: 174 MG/DL (ref 200–360)
TROPONIN T DELTA: -16 NG/L
TROPONIN T SERPL HS-MCNC: 55 NG/L
TSH SERPL DL<=0.05 MIU/L-ACNC: 1.44 UIU/ML (ref 0.27–4.2)
UROBILINOGEN UR QL STRIP: ABNORMAL
WBC # UR STRIP: ABNORMAL /HPF
WBC MORPH BLD: NORMAL
WBC NRBC COR # BLD AUTO: 9.41 10*3/MM3 (ref 3.4–10.8)
WHOLE BLOOD HOLD COAG: NORMAL
WHOLE BLOOD HOLD SPECIMEN: NORMAL

## 2024-06-05 PROCEDURE — 86900 BLOOD TYPING SEROLOGIC ABO: CPT | Performed by: EMERGENCY MEDICINE

## 2024-06-05 PROCEDURE — 82728 ASSAY OF FERRITIN: CPT | Performed by: INTERNAL MEDICINE

## 2024-06-05 PROCEDURE — 86901 BLOOD TYPING SEROLOGIC RH(D): CPT

## 2024-06-05 PROCEDURE — 93005 ELECTROCARDIOGRAM TRACING: CPT | Performed by: EMERGENCY MEDICINE

## 2024-06-05 PROCEDURE — 25010000002 ZIPRASIDONE MESYLATE PER 10 MG: Performed by: EMERGENCY MEDICINE

## 2024-06-05 PROCEDURE — 86900 BLOOD TYPING SEROLOGIC ABO: CPT

## 2024-06-05 PROCEDURE — 99223 1ST HOSP IP/OBS HIGH 75: CPT | Performed by: INTERNAL MEDICINE

## 2024-06-05 PROCEDURE — 83540 ASSAY OF IRON: CPT | Performed by: INTERNAL MEDICINE

## 2024-06-05 PROCEDURE — 84443 ASSAY THYROID STIM HORMONE: CPT | Performed by: INTERNAL MEDICINE

## 2024-06-05 PROCEDURE — 36430 TRANSFUSION BLD/BLD COMPNT: CPT

## 2024-06-05 PROCEDURE — 85014 HEMATOCRIT: CPT | Performed by: INTERNAL MEDICINE

## 2024-06-05 PROCEDURE — 85018 HEMOGLOBIN: CPT | Performed by: INTERNAL MEDICINE

## 2024-06-05 PROCEDURE — 84466 ASSAY OF TRANSFERRIN: CPT | Performed by: INTERNAL MEDICINE

## 2024-06-05 PROCEDURE — 86901 BLOOD TYPING SEROLOGIC RH(D): CPT | Performed by: EMERGENCY MEDICINE

## 2024-06-05 PROCEDURE — 81001 URINALYSIS AUTO W/SCOPE: CPT | Performed by: EMERGENCY MEDICINE

## 2024-06-05 PROCEDURE — 93010 ELECTROCARDIOGRAM REPORT: CPT | Performed by: INTERNAL MEDICINE

## 2024-06-05 PROCEDURE — 85007 BL SMEAR W/DIFF WBC COUNT: CPT | Performed by: EMERGENCY MEDICINE

## 2024-06-05 PROCEDURE — 36415 COLL VENOUS BLD VENIPUNCTURE: CPT

## 2024-06-05 PROCEDURE — 86850 RBC ANTIBODY SCREEN: CPT | Performed by: EMERGENCY MEDICINE

## 2024-06-05 PROCEDURE — 86923 COMPATIBILITY TEST ELECTRIC: CPT

## 2024-06-05 PROCEDURE — 25010000002 HALOPERIDOL LACTATE PER 5 MG: Performed by: EMERGENCY MEDICINE

## 2024-06-05 PROCEDURE — 84484 ASSAY OF TROPONIN QUANT: CPT | Performed by: EMERGENCY MEDICINE

## 2024-06-05 PROCEDURE — 85025 COMPLETE CBC W/AUTO DIFF WBC: CPT | Performed by: EMERGENCY MEDICINE

## 2024-06-05 PROCEDURE — 83735 ASSAY OF MAGNESIUM: CPT | Performed by: EMERGENCY MEDICINE

## 2024-06-05 PROCEDURE — 99285 EMERGENCY DEPT VISIT HI MDM: CPT

## 2024-06-05 PROCEDURE — P9612 CATHETERIZE FOR URINE SPEC: HCPCS

## 2024-06-05 PROCEDURE — 80053 COMPREHEN METABOLIC PANEL: CPT | Performed by: EMERGENCY MEDICINE

## 2024-06-05 PROCEDURE — P9016 RBC LEUKOCYTES REDUCED: HCPCS

## 2024-06-05 PROCEDURE — 71045 X-RAY EXAM CHEST 1 VIEW: CPT

## 2024-06-05 RX ORDER — LEVOTHYROXINE SODIUM 0.05 MG/1
50 TABLET ORAL DAILY
Status: DISCONTINUED | OUTPATIENT
Start: 2024-06-06 | End: 2024-06-07 | Stop reason: HOSPADM

## 2024-06-05 RX ORDER — ASPIRIN 81 MG/1
81 TABLET, CHEWABLE ORAL EVERY MORNING
Status: DISCONTINUED | OUTPATIENT
Start: 2024-06-06 | End: 2024-06-07 | Stop reason: HOSPADM

## 2024-06-05 RX ORDER — SODIUM CHLORIDE 0.9 % (FLUSH) 0.9 %
10 SYRINGE (ML) INJECTION AS NEEDED
Status: DISCONTINUED | OUTPATIENT
Start: 2024-06-05 | End: 2024-06-07 | Stop reason: HOSPADM

## 2024-06-05 RX ORDER — ZIPRASIDONE MESYLATE 20 MG/ML
10 INJECTION, POWDER, LYOPHILIZED, FOR SOLUTION INTRAMUSCULAR ONCE
Status: COMPLETED | OUTPATIENT
Start: 2024-06-05 | End: 2024-06-05

## 2024-06-05 RX ORDER — SODIUM CHLORIDE 0.9 % (FLUSH) 0.9 %
10 SYRINGE (ML) INJECTION EVERY 12 HOURS SCHEDULED
Status: DISCONTINUED | OUTPATIENT
Start: 2024-06-05 | End: 2024-06-07 | Stop reason: HOSPADM

## 2024-06-05 RX ORDER — PANTOPRAZOLE SODIUM 40 MG/10ML
40 INJECTION, POWDER, LYOPHILIZED, FOR SOLUTION INTRAVENOUS
Status: DISCONTINUED | OUTPATIENT
Start: 2024-06-05 | End: 2024-06-06

## 2024-06-05 RX ORDER — MIRTAZAPINE 15 MG/1
7.5 TABLET, FILM COATED ORAL NIGHTLY
Status: DISCONTINUED | OUTPATIENT
Start: 2024-06-05 | End: 2024-06-07 | Stop reason: HOSPADM

## 2024-06-05 RX ORDER — RISPERIDONE 0.25 MG/1
0.25 TABLET ORAL 2 TIMES DAILY
Status: DISCONTINUED | OUTPATIENT
Start: 2024-06-05 | End: 2024-06-07 | Stop reason: HOSPADM

## 2024-06-05 RX ORDER — SODIUM CHLORIDE 9 MG/ML
40 INJECTION, SOLUTION INTRAVENOUS AS NEEDED
Status: DISCONTINUED | OUTPATIENT
Start: 2024-06-05 | End: 2024-06-07 | Stop reason: HOSPADM

## 2024-06-05 RX ORDER — HALOPERIDOL 5 MG/ML
10 INJECTION INTRAMUSCULAR ONCE
Status: COMPLETED | OUTPATIENT
Start: 2024-06-05 | End: 2024-06-05

## 2024-06-05 RX ORDER — ATORVASTATIN CALCIUM 40 MG/1
40 TABLET, FILM COATED ORAL EVERY MORNING
Status: DISCONTINUED | OUTPATIENT
Start: 2024-06-06 | End: 2024-06-07 | Stop reason: HOSPADM

## 2024-06-05 RX ORDER — FERROUS SULFATE 325(65) MG
325 TABLET ORAL EVERY OTHER DAY
Status: DISCONTINUED | OUTPATIENT
Start: 2024-06-05 | End: 2024-06-07 | Stop reason: HOSPADM

## 2024-06-05 RX ORDER — ONDANSETRON 2 MG/ML
4 INJECTION INTRAMUSCULAR; INTRAVENOUS EVERY 6 HOURS PRN
Status: DISCONTINUED | OUTPATIENT
Start: 2024-06-05 | End: 2024-06-07 | Stop reason: HOSPADM

## 2024-06-05 RX ORDER — AMOXICILLIN 250 MG
2 CAPSULE ORAL 2 TIMES DAILY PRN
Status: DISCONTINUED | OUTPATIENT
Start: 2024-06-05 | End: 2024-06-07 | Stop reason: HOSPADM

## 2024-06-05 RX ORDER — BISACODYL 5 MG/1
5 TABLET, DELAYED RELEASE ORAL DAILY PRN
Status: DISCONTINUED | OUTPATIENT
Start: 2024-06-05 | End: 2024-06-07 | Stop reason: HOSPADM

## 2024-06-05 RX ORDER — BISACODYL 10 MG
10 SUPPOSITORY, RECTAL RECTAL DAILY PRN
Status: DISCONTINUED | OUTPATIENT
Start: 2024-06-05 | End: 2024-06-07 | Stop reason: HOSPADM

## 2024-06-05 RX ORDER — POLYETHYLENE GLYCOL 3350 17 G/17G
17 POWDER, FOR SOLUTION ORAL DAILY PRN
Status: DISCONTINUED | OUTPATIENT
Start: 2024-06-05 | End: 2024-06-07 | Stop reason: HOSPADM

## 2024-06-05 RX ADMIN — HALOPERIDOL LACTATE 10 MG: 5 INJECTION, SOLUTION INTRAMUSCULAR at 17:06

## 2024-06-05 RX ADMIN — ZIPRASIDONE MESYLATE 10 MG: 20 INJECTION, POWDER, LYOPHILIZED, FOR SOLUTION INTRAMUSCULAR at 17:49

## 2024-06-05 RX ADMIN — MIRTAZAPINE 7.5 MG: 15 TABLET, FILM COATED ORAL at 21:13

## 2024-06-05 RX ADMIN — FERROUS SULFATE TAB 325 MG (65 MG ELEMENTAL FE) 325 MG: 325 (65 FE) TAB at 21:13

## 2024-06-05 RX ADMIN — RISPERIDONE 0.25 MG: 0.25 TABLET ORAL at 21:13

## 2024-06-05 RX ADMIN — Medication 10 ML: at 21:08

## 2024-06-05 NOTE — H&P
[facility]  HISTORY AND PHYSICAL    Patient Name: Mónica Carrillo  : 1945  MRN: [id number]  Primary Care Physician: Molina Domínguez MD  Date of admission: 2024      Subjective   Subjective     Chief Complaint: Generalized weakness    HPI:  Mónica Carrillo is a 79 y.o. female history of dementia, prior MACK, hypertension, hypothyroidism who presents to the ED with increased fatigue, generalized weakness.  Workup at nursing home did reveal hg of 6.4 hence her presented to the ED for transfusion.  Here she is hemodynamically stable, repeat hemoglobin 7.2.  Patient has dementia unable to provide any additional information.  However from chart review, she has not had any reported hematemesis or melena.      Personal History     Past Medical History:   Diagnosis Date    Dementia     Hypertension     Mood disorder     Thyroid disease      History reviewed. No pertinent surgical history.    Family History: family history includes No Known Problems in her father and mother.     Social History:  reports that she has never smoked. She has never been exposed to tobacco smoke. She has never used smokeless tobacco. She reports that she does not drink alcohol and does not use drugs.  Social History     Social History Narrative    Not on file       Medications:  Available home medication information reviewed.  Ergocalciferol, HYDROcodone-acetaminophen, Multiple Vitamins-Minerals, acetaminophen, aspirin, atorvastatin, docusate sodium, ferrous sulfate, folic acid, levothyroxine, magnesium hydroxide, metoprolol tartrate, mirtazapine, risperiDONE, and traMADol    No Known Allergies    Objective   Objective     Vital Signs:   Temp:  [98.9 °F (37.2 °C)] 98.9 °F (37.2 °C)  Heart Rate:  [67-72] 68  Resp:  [16] 16  BP: (108-127)/(65-93) 115/77       Physical Exam   Constitutional: Awake, alert  Eyes: PERRLA, sclerae anicteric, no conjunctival injection  HENT: NCAT, mucous membranes  moist  Neck: Supple, no thyromegaly, no lymphadenopathy, trachea midline  Respiratory: Clear to auscultation bilaterally, nonlabored respirations   Cardiovascular: RRR, no murmurs, rubs, or gallops, palpable pedal pulses bilaterally  Gastrointestinal: Positive bowel sounds, soft, nontender, nondistended  Musculoskeletal: No bilateral ankle edema, no clubbing or cyanosis to extremities  Psychiatric: Appropriate affect, cooperative  Neurologic: Slurred speech, pleasantly confused  Skin: No rashes     Result Review:  I have personally reviewed the results from the time of this admission to 6/5/2024 16:22 EDT and agree with these findings:  [x]  Laboratory list / accordion  []  Microbiology  []  Radiology  []  EKG/Telemetry   []  Cardiology/Vascular   []  Pathology  []  Old records  []  Other:  Most notable findings include:     LAB RESULTS:      Lab 06/05/24  1301   WBC 9.41   HEMOGLOBIN 7.2*   HEMATOCRIT 26.5*   PLATELETS 442   NEUTROS ABS 5.57   IMMATURE GRANS (ABS) 0.03   LYMPHS ABS 2.25   MONOS ABS 0.57   EOS ABS 0.93*   MCV 76.8*         Lab 06/05/24  1301   SODIUM 139   POTASSIUM 4.1   CHLORIDE 103   CO2 30.0*   ANION GAP 6.0   BUN 8   CREATININE 0.72   EGFR 84.1   GLUCOSE 116*   CALCIUM 9.1   MAGNESIUM 1.9         Lab 06/05/24  1301   TOTAL PROTEIN 8.1   ALBUMIN 2.8*   GLOBULIN 5.3   ALT (SGPT) <5   AST (SGOT) 10   BILIRUBIN 0.2   ALK PHOS 97         Lab 06/05/24  1301   HSTROP T 55*             Lab 06/05/24  1357 06/05/24  1309   ABO TYPING O O   RH TYPING Positive Positive   ANTIBODY SCREEN  --  Negative         UA          6/5/2024    15:02   Urinalysis   Squamous Epithelial Cells, UA Too Numerous to Count    Specific Gravity, UA 1.015    Ketones, UA Negative    Blood, UA Trace    Leukocytes, UA Large (3+)    Nitrite, UA Negative    RBC, UA 0-2    WBC, UA Too Numerous to Count    Bacteria, UA 1+        Microbiology Results (last 10 days)       ** No results found for the last 240 hours. **            XR Chest  1 View    Result Date: 6/5/2024  XR CHEST 1 VW Date of Exam: 6/5/2024 12:48 PM EDT Indication: Weak/Dizzy/AMS triage protocol Comparison: 7/17/2022 Findings: The cardiomediastinal silhouette is within normal limits. Chronic left basilar scarring. No new consolidation, pneumothorax, or significant pleural effusion. Osseous structures grossly intact.     Impression: Impression: No acute process. Electronically Signed: John Hope MD  6/5/2024 1:24 PM EDT  Workstation ID: [id number]       Assessment & Plan   Assessment & Plan       Symptomatic anemia    Essential hypertension    Hypothyroidism    Hyperlipidemia    Dementia    79-year-old female history of dementia, currently a resident at [facility], history of MACK, hypertension, hypothyroidism who presents to the ED with generalized weakness and Hg of 6.4    Symptomatic anemia anemia,  Suspected blood loss anemia  -Concern for possible blood loss, Hemoccult is positive, patient however was history of MACK  -Hg was 6.4 from yesterdays labs, was 7.2 here baseline from chart review her baseline seems to be~10  -Follow-up iron profile, continue Protonix IV twice daily  -1 unit PRBC has been ordered in the ED continue to trend H&H  -GI consult has been placed, keep n.p.o. after midnight for possible EGD    Hypertension  -BP currently stable, hold home hypertensives    Hypothyroidism  -Resume Synthroid    Alzheimer's dementia  -Continue Remeron  -Continue risperidone    DVT prophylaxis:  Mechanical DVT prophylaxis orders are signed and held.      CODE STATUS:    Code Status and Medical Interventions:   Ordered at: 06/05/24 1621     Medical Intervention Limits:    No intubation (DNI)     Code Status (Patient has no pulse and is not breathing):    No CPR (Do Not Attempt to Resuscitate)     Medical Interventions (Patient has pulse or is breathing):    Limited Support     Expected Discharge   Expected Discharge Date: 6/7/2024; Expected Discharge Time:      Kyra Lobo  MD  06/05/24

## 2024-06-05 NOTE — Clinical Note
Level of Care: Telemetry [5]   Diagnosis: Symptomatic anemia [[id number]]   Admitting Physician: JENNIE ARRIAGA [[id number]]   Attending Physician: JENNIE ARRIAGA [[id number]]   Bed Request Comments: TELE

## 2024-06-05 NOTE — ED NOTES
Mónica Carrillo    Nursing Report ED to Floor:  Mental status: A+Ox1  Ambulatory status: Bedfast  Oxygen Therapy:  RA  Cardiac Rhythm: NSR  Admitted from: [facility]  Safety Concerns:  Falls  Social Issues: N/A  ED Room #:  27    ED Nurse Phone Extension - [phone] or may call [phone].      HPI:   Chief Complaint   Patient presents with    Weakness - Generalized    Anemia       Past Medical History:  Past Medical History:   Diagnosis Date    Dementia     Hypertension     Mood disorder     Thyroid disease         Past Surgical History:  History reviewed. No pertinent surgical history.     Admitting Doctor:   Kyra Lobo MD    Consulting Provider(s):  Consults       No orders found from 5/7/2024 to 6/6/2024.             Admitting Diagnosis:   The primary encounter diagnosis was Acute GI bleeding. Diagnoses of Acute blood loss anemia and Generalized weakness were also pertinent to this visit.    Most Recent Vitals:   Vitals:    06/05/24 1600 06/05/24 1601 06/05/24 1630 06/05/24 1648   BP: 115/77  128/69    BP Location:       Patient Position:       Pulse:  68 68    Resp:       Temp:    98.9 °F (37.2 °C)   TempSrc:    Oral   SpO2:  96% 99%    Weight:       Height:           Active LDAs/IV Access:   Lines, Drains & Airways       Active LDAs       Name Placement date Placement time Site Days    Peripheral IV 06/05/24 1302 Right Antecubital 06/05/24  1302  Antecubital  less than 1                    Labs (abnormal labs have a star):   Labs Reviewed   COMPREHENSIVE METABOLIC PANEL - Abnormal; Notable for the following components:       Result Value    Glucose 116 (*)     CO2 30.0 (*)     Albumin 2.8 (*)     All other components within normal limits    Narrative:     GFR Normal >60  Chronic Kidney Disease <60  Kidney Failure <15    The GFR formula is only valid for adults with stable renal function between ages 18 and 70.   SINGLE HS TROPONIN T - Abnormal; Notable for the following components:    HS Troponin T 55 (*)     All  other components within normal limits    Narrative:     High Sensitive Troponin T Reference Range:  <14.0 ng/L- Negative Female for AMI  <22.0 ng/L- Negative Male for AMI  >=14 - Abnormal Female indicating possible myocardial injury.  >=22 - Abnormal Male indicating possible myocardial injury.   Clinicians would have to utilize clinical acumen, EKG, Troponin, and serial changes to determine if it is an Acute Myocardial Infarction or myocardial injury due to an underlying chronic condition.        URINALYSIS W/ MICROSCOPIC IF INDICATED (NO CULTURE) - Abnormal; Notable for the following components:    Appearance, UA Cloudy (*)     Blood, UA Trace (*)     Leuk Esterase, UA Large (3+) (*)     All other components within normal limits   CBC WITH AUTO DIFFERENTIAL - Abnormal; Notable for the following components:    RBC 3.45 (*)     Hemoglobin 7.2 (*)     Hematocrit 26.5 (*)     MCV 76.8 (*)     MCH 20.9 (*)     MCHC 27.2 (*)     RDW 20.9 (*)     RDW-SD 57.5 (*)     Eosinophil % 9.9 (*)     Eosinophils, Absolute 0.93 (*)     All other components within normal limits    Narrative:     Appended report. These results have been appended to a previously verified report.   URINALYSIS, MICROSCOPIC ONLY - Abnormal; Notable for the following components:    WBC, UA Too Numerous to Count (*)     Bacteria, UA 1+ (*)     Squamous Epithelial Cells, UA Too Numerous to Count (*)     All other components within normal limits   MAGNESIUM - Normal   RAINBOW DRAW    Narrative:     The following orders were created for panel order [place] Draw.  Procedure                               Abnormality         Status                     ---------                               -----------         ------                     Green Top (Gel)[[id number]]                                  Final result               Lavender Top[[id number]]                                     Final result               Gold Top - SST[[id number]]                                    Final result               Lewis Top[[id number]]                                         Final result               Light Blue Top[[id number]]                                   Final result                 Please view results for these tests on the individual orders.   SCAN SLIDE   HIGH SENSITIVITIY TROPONIN T 2HR   TYPE AND SCREEN   ABORH 2ND SPECIMEN VERIFICATION   PREPARE RBC   CBC AND DIFFERENTIAL    Narrative:     The following orders were created for panel order CBC & Differential.  Procedure                               Abnormality         Status                     ---------                               -----------         ------                     CBC Auto Differential[[id number]]        Abnormal            Final result               Scan Slide[[id number]]                                       Final result                 Please view results for these tests on the individual orders.   GREEN TOP   LAVENDER TOP   GOLD TOP - SST   GRAY TOP   LIGHT BLUE TOP       Meds Given in ED:   Medications   sodium chloride 0.9 % flush 10 mL (has no administration in time range)   pantoprazole (PROTONIX) injection 40 mg (has no administration in time range)   haloperidol lactate (HALDOL) injection 10 mg (10 mg Intramuscular Given 6/5/24 1706)           Last NIH score:                                                          Dysphagia screening results:        [place] Coma Scale:  No data recorded     CIWA:        Restraint Type:            Isolation Status:  No active isolations

## 2024-06-05 NOTE — ED PROVIDER NOTES
[place]    EMERGENCY DEPARTMENT ENCOUNTER      Pt Name: Mónica Carrillo  MRN: [id number]  YOB: 1945  Date of evaluation: 6/5/2024  Provider: Tim Yanes MD    CHIEF COMPLAINT       Chief Complaint   Patient presents with    Weakness - Generalized    Anemia         HISTORY OF PRESENT ILLNESS   Mónica Carrillo is a 79 y.o. female who presents to the emergency department with generalized weakness and anemia.  Patient has dementia and so history is limited from her.  She complains of being weak but has no other specific complaint.  Her nursing facility check some lab work and her hemoglobin was 6.4 and was referred to the emergency department.  She has no complaints of abdominal pain, nausea, or vomiting.      Nursing notes were reviewed.    REVIEW OF SYSTEMS     ROS:  A chief complaint appropriate review of systems was completed and is negative except as noted in the HPI.      PAST MEDICAL HISTORY     Past Medical History:   Diagnosis Date    Dementia     Hypertension     Mood disorder     Thyroid disease          SURGICAL HISTORY     History reviewed. No pertinent surgical history.      CURRENT MEDICATIONS       Current Facility-Administered Medications:     sodium chloride 0.9 % flush 10 mL, 10 mL, Intravenous, PRN, Tim Yanes MD    Current Outpatient Medications:     acetaminophen (TYLENOL) 500 MG tablet, Take 500 mg by mouth Every 6 (Six) Hours As Needed for Mild Pain ., Disp: , Rfl:     aspirin 81 MG chewable tablet, Chew 81 mg Every Morning., Disp: , Rfl:     atorvastatin (LIPITOR) 40 MG tablet, Take 40 mg by mouth Every Morning., Disp: , Rfl:     docusate sodium (COLACE) 100 MG capsule, Take 100 mg by mouth 2 (Two) Times a Day., Disp: , Rfl:     Ergocalciferol 50 MCG (2000 UT) capsule, Take 1 capsule by mouth Every Morning., Disp: , Rfl:     ferrous sulfate (FerrouSul) 325 (65 FE) MG tablet, Take 1 tablet by mouth Every Other Day., Disp: 30 tablet, Rfl: 0    folic acid (FOLVITE) 1  "MG tablet, Take 1 tablet by mouth Daily., Disp: , Rfl:     HYDROcodone-acetaminophen (NORCO) 5-325 MG per tablet, Take 1 tablet by mouth Every 6 (Six) Hours As Needed., Disp: , Rfl:     levothyroxine (SYNTHROID, LEVOTHROID) 50 MCG tablet, Take 50 mcg by mouth Daily., Disp: , Rfl:     magnesium hydroxide (MILK OF MAGNESIA) 400 MG/5ML suspension, Take 30 mL by mouth Daily As Needed for Constipation., Disp: , Rfl:     metoprolol tartrate (LOPRESSOR) 50 MG tablet, Take 50 mg by mouth 2 (Two) Times a Day., Disp: , Rfl:     mirtazapine (REMERON) 7.5 MG tablet, Take 7.5 mg by mouth Every Night., Disp: , Rfl:     Multiple Vitamins-Minerals (MULTIVITAMIN & MINERAL PO), Take 1 tablet by mouth Every Morning., Disp: , Rfl:     risperiDONE (risperDAL) 0.25 MG tablet, Take 0.25 mg by mouth 2 (Two) Times a Day., Disp: , Rfl:     traMADol (ULTRAM) 50 MG tablet, Take 50 mg by mouth Every 6 (Six) Hours As Needed for Moderate Pain ., Disp: , Rfl:     ALLERGIES     Patient has no known allergies.    FAMILY HISTORY       Family History   Problem Relation Age of Onset    No Known Problems Mother     No Known Problems Father           SOCIAL HISTORY       Social History     Socioeconomic History    Marital status:    Tobacco Use    Smoking status: Never     Passive exposure: Never    Smokeless tobacco: Never   Vaping Use    Vaping status: Never Used   Substance and Sexual Activity    Alcohol use: Never    Drug use: Never    Sexual activity: Defer         PHYSICAL EXAM    (up to 7 for level 4, 8 or more for level 5)     Vitals:    06/05/24 1239 06/05/24 1243   BP: 120/65    BP Location: Right arm    Patient Position: Lying    Pulse: 72    Resp: 16    Temp:  98.9 °F (37.2 °C)   TempSrc:  Oral   SpO2: 99%    Weight: 72.6 kg (160 lb)    Height: 172.7 cm (68\")        General: Awake, alert, no acute distress.  HEENT: Conjunctivae normal.  Neck: Trachea midline.  Cardiac: Heart regular rate, rhythm, no murmurs, rubs, or gallops  Lungs: " Lungs are clear to auscultation, there is no wheezing, rhonchi, or rales. There is no use of accessory muscles.  Chest wall: There is no tenderness to palpation over the chest wall or over ribs  Abdomen: Abdomen is soft, nontender, nondistended. There are no firm or pulsatile masses, no rebound rigidity or guarding.  Rectal exam reveals brown stool that is Hemoccult positive.  Musculoskeletal: No deformity.  Neuro: Alert   Dermatology: Skin is warm and dry  Psych: Mentation is grossly normal, cognition is grossly normal. Affect is appropriate.        DIAGNOSTIC RESULTS     EKG: All EKGs are interpreted by the Emergency Department Physician who either signs or Co-signs this chart in the absence of a cardiologist.    ECG 12 Lead ED Triage Standing Order; Weak / Dizzy / AMS   Preliminary Result   Test Reason : ED Triage Standing Order~   Blood Pressure :   */*   mmHG   Vent. Rate :  71 BPM     Atrial Rate :  71 BPM      P-R Int : 160 ms          QRS Dur :  82 ms       QT Int : 406 ms       P-R-T Axes :  -5 -32   3 degrees      QTc Int : 441 ms      Normal sinus rhythm   Left axis deviation   Abnormal ECG   When compared with ECG of 17-JUL-2022 08:31,   Nonspecific T wave abnormality now evident in Anterior leads      Referred By: EDMD           Confirmed By:             RADIOLOGY:   [x] Radiologist's Report Reviewed:  XR Chest 1 View   Final Result   Impression:   No acute process.                  Electronically Signed: John Hope MD     6/5/2024 1:24 PM EDT     Workstation ID: [id number]          I ordered and independently reviewed the above noted radiographic studies.        LABS:    I have reviewed and interpreted all of the currently available lab results from this visit (if applicable):  Results for orders placed or performed during the hospital encounter of 06/05/24   Comprehensive Metabolic Panel    Specimen: Blood   Result Value Ref Range    Glucose 116 (H) 65 - 99 mg/dL    BUN 8 8 - 23 mg/dL    Creatinine  0.72 0.57 - 1.00 mg/dL    Sodium 139 136 - 145 mmol/L    Potassium 4.1 3.5 - 5.2 mmol/L    Chloride 103 98 - 107 mmol/L    CO2 30.0 (H) 22.0 - 29.0 mmol/L    Calcium 9.1 8.6 - 10.5 mg/dL    Total Protein 8.1 6.0 - 8.5 g/dL    Albumin 2.8 (L) 3.5 - 5.2 g/dL    ALT (SGPT) <5 1 - 33 U/L    AST (SGOT) 10 1 - 32 U/L    Alkaline Phosphatase 97 39 - 117 U/L    Total Bilirubin 0.2 0.0 - 1.2 mg/dL    Globulin 5.3 gm/dL    A/G Ratio 0.5 g/dL    BUN/Creatinine Ratio 11.1 7.0 - 25.0    Anion Gap 6.0 5.0 - 15.0 mmol/L    eGFR 84.1 >60.0 mL/min/1.73   Single High Sensitivity Troponin T    Specimen: Blood   Result Value Ref Range    HS Troponin T 55 (C) <14 ng/L   Magnesium    Specimen: Blood   Result Value Ref Range    Magnesium 1.9 1.6 - 2.4 mg/dL   Urinalysis With Microscopic If Indicated (No Culture) - Straight Cath    Specimen: Straight Cath; Urine   Result Value Ref Range    Color, UA Yellow Yellow, Straw    Appearance, UA Cloudy (A) Clear    pH, UA 5.5 5.0 - 8.0    Specific Gravity, UA 1.015 1.001 - 1.030    Glucose, UA Negative Negative    Ketones, UA Negative Negative    Bilirubin, UA Negative Negative    Blood, UA Trace (A) Negative    Protein, UA Negative Negative    Leuk Esterase, UA Large (3+) (A) Negative    Nitrite, UA Negative Negative    Urobilinogen, UA 0.2 E.U./dL 0.2 - 1.0 E.U./dL   CBC Auto Differential    Specimen: Blood   Result Value Ref Range    WBC 9.41 3.40 - 10.80 10*3/mm3    RBC 3.45 (L) 3.77 - 5.28 10*6/mm3    Hemoglobin 7.2 (L) 12.0 - 15.9 g/dL    Hematocrit 26.5 (L) 34.0 - 46.6 %    MCV 76.8 (L) 79.0 - 97.0 fL    MCH 20.9 (L) 26.6 - 33.0 pg    MCHC 27.2 (L) 31.5 - 35.7 g/dL    RDW 20.9 (H) 12.3 - 15.4 %    RDW-SD 57.5 (H) 37.0 - 54.0 fl    MPV 8.5 6.0 - 12.0 fL    Platelets 442 140 - 450 10*3/mm3    Neutrophil % 59.2 42.7 - 76.0 %    Lymphocyte % 23.9 19.6 - 45.3 %    Monocyte % 6.1 5.0 - 12.0 %    Eosinophil % 9.9 (H) 0.3 - 6.2 %    Basophil % 0.6 0.0 - 1.5 %    Immature Grans % 0.3 0.0 - 0.5 %     Neutrophils, Absolute 5.57 1.70 - 7.00 10*3/mm3    Lymphocytes, Absolute 2.25 0.70 - 3.10 10*3/mm3    Monocytes, Absolute 0.57 0.10 - 0.90 10*3/mm3    Eosinophils, Absolute 0.93 (H) 0.00 - 0.40 10*3/mm3    Basophils, Absolute 0.06 0.00 - 0.20 10*3/mm3    Immature Grans, Absolute 0.03 0.00 - 0.05 10*3/mm3    nRBC 0.0 0.0 - 0.2 /100 WBC   Scan Slide    Specimen: Blood   Result Value Ref Range    Anisocytosis Mod/2+ None Seen    Hypochromia Mod/2+ None Seen    Polychromasia Slight/1+ None Seen    WBC Morphology Normal Normal    Platelet Morphology Normal Normal   Urinalysis, Microscopic Only - Straight Cath    Specimen: Straight Cath; Urine   Result Value Ref Range    RBC, UA 0-2 None Seen, 0-2 /HPF    WBC, UA Too Numerous to Count (A) None Seen, 0-2 /HPF    Bacteria, UA 1+ (A) None Seen, Trace /HPF    Squamous Epithelial Cells, UA Too Numerous to Count (A) None Seen, 0-2 /HPF    Hyaline Casts, UA None Seen 0 - 6 /LPF    Methodology Manual Light Microscopy    ECG 12 Lead ED Triage Standing Order; Weak / Dizzy / AMS   Result Value Ref Range    QT Interval 406 ms    QTC Interval 441 ms   Type & Screen    Specimen: Blood   Result Value Ref Range    ABO Type O     RH type Positive     Antibody Screen Negative     T&S Expiration Date 6/8/2024 11:59:59 PM    ABO RH Specimen Verification    Specimen: Blood   Result Value Ref Range    ABO Type O     RH type Positive    Prepare RBC, 1 Units   Result Value Ref Range    Product Code A4561S01     Unit Number [id number]     UNIT  ABO O     UNIT  RH POS     Crossmatch Interpretation Compatible     Dispense Status XM     Blood Expiration Date [id number]     Blood Type Barcode 5100    Green Top (Gel)   Result Value Ref Range    Extra Tube Hold for add-ons.    Lavender Top   Result Value Ref Range    Extra Tube hold for add-on    Gold Top - SST   Result Value Ref Range    Extra Tube Hold for add-ons.    Gray Top   Result Value Ref Range    Extra Tube Hold for add-ons.    Light  Blue Top   Result Value Ref Range    Extra Tube Hold for add-ons.         If labs were ordered, I independently reviewed the results and considered them in treating the patient.      EMERGENCY DEPARTMENT COURSE and DIFFERENTIAL DIAGNOSIS/MDM:   Vitals:  AS OF 16:05 EDT    BP - 120/65  HR - 72  TEMP - 98.9 °F (37.2 °C) (Oral)  O2 SATS - 99%        Discussion below represents my analysis of pertinent findings related to patient's condition, differential diagnosis, treatment plan and final disposition.      Differential diagnosis:  The differential diagnosis associated with the patient's presentation includes: Gastritis, PUD, esophageal varices, angiodysplasia, diverticulosis, colonic mass, anemia, electrolyte derangement, dehydration, UTI, pneumonia      Independent interpretations (ECG/rhythm strip/X-ray/US/CT scan): I independently interpreted the patient's chest x-ray and cardiac monitor.  There is no evidence of pulmonary infiltrate and the patient is in sinus rhythm.      Additional sources:  Discussed/obtained information from independent historians:   [] Spouse:   [] Parent:   [] Friend:   [] EMS:   [] Other:  External (non-ED) record review:   [] Inpatient record:   [] Office record:   [] Outpatient record:   [] Prior Outpatient labs:   [] Prior Outpatient radiology:   [] Primary Care record:   [] Outside ED record:   [x] Other: Reviewed documents from nursing facility.  Hemoglobin as checked there was 6.4.      Patient's care impacted by:   [] Diabetes   [x] Hypertension   [] Coronary Artery Disease   [] Cancer   [x] Other: Dementia    Care significantly affected by Social Determinants of Health (housing and economic circumstances, unemployment)    [] Yes     [x] No   If yes, Patient's care significantly limited by  Social Determinants of Health including:    [] Inadequate housing    [] Low income    [] Alcoholism and drug addiction in family    [] Problems related to primary support group    []  Unemployment    [] Problems related to employment    [] Other Social Determinants of Health:       Consideration of admission/observation vs discharge: Patient presents with acute blood loss anemia that is symptomatic along with Hemoccult positive stool and requires admission for further evaluation      ED Course:    ED Course as of 06/05/24 1605   Wed Jun 05, 2024   1540 Patient presents with generalized weakness, acute anemia, GI bleeding. Has had increased fatigue over the past several days and had lab work performed which showed a hemoglobin of 6.4. Patient complains of general weakness but otherwise has no other specific complaints. She is hemodynamically stable. Hemoglobin today was 7.2. Rectal exam shows brown stool that is Hemoccult positive. Have ordered 1 unit transfusion. She is oriented x 1 at baseline. [NS]   1540 I discussed case with hospitalist Dr. Arriaga.  Discussed history, presentation, workup.  Accepts patient for admission. [NS]      ED Course User Index  [NS] Tim Yanes MD           CRITICAL CARE TIME    Approximately 35 minutes of discontinuous critical care time was provided to this patient by myself absent of any time spent performing procedures.  Patient presents critically ill with acute blood loss anemia secondary to GI bleeding placing the cardiovascular system at risk requiring the following interventions: Blood transfusion, interpretation of labs/ECG/imaging, frequent reassessment, coordination of admission.  Patient at high risk of deterioration and possibly death without these interventions.      FINAL IMPRESSION      1. Acute GI bleeding    2. Acute blood loss anemia    3. Generalized weakness          DISPOSITION/PLAN     ED Disposition       ED Disposition   Decision to Admit    Condition   --    Comment   Level of Care: Telemetry [5]   Diagnosis: Symptomatic anemia [[id number]]   Admitting Physician: JENNIE ARRIAGA [[id number]]   Attending Physician: JENNIE ARRIAGA [[id number]]   Bed  Request Comments: TELE   Certification: I Certify That Inpatient Hospital Services Are Medically Necessary For Greater Than 2 Midnights                   Comment: Please note this report has been produced using speech recognition software.      Tim Yanes MD  Attending Emergency Physician             Tim Ynaes MD  06/05/24 [id number]

## 2024-06-05 NOTE — LETTER
EMS Transport Request  For use at [facility], [place], [place], [place], and [place] only   Patient Name: Mónica Carrillo : 1945   Weight:71.1 kg (156 lb 11.2 oz) Pick-up Location: [facility] BLS/ALS:    Insurance: MEDICARE Auth End Date:    Pre-Cert #: D/C Summary complete:    Destination: [facility]   Contact Precautions: None   Equipment (O2, Fluids, etc.): None   Arrive By Date/Time: 24 Stretcher/WC: Stretcher   CM Requesting: Kaitlin Lr RN Ext: [phone]   Notes/Medical Necessity: Impaired functional mobility, endurance, balance, and gait.      ______________________________________________________________________    *Only 2 patient bags OR 1 carry-on size bag are permitted.  Wheelchairs and walkers CANNOT transported with the patient. Acknowledge: Yes

## 2024-06-06 LAB
BH BB BLOOD EXPIRATION DATE: NORMAL
BH BB BLOOD TYPE BARCODE: 5100
BH BB DISPENSE STATUS: NORMAL
BH BB PRODUCT CODE: NORMAL
BH BB UNIT NUMBER: NORMAL
CROSSMATCH INTERPRETATION: NORMAL
QT INTERVAL: 380 MS
QTC INTERVAL: 443 MS
UNIT  ABO: NORMAL
UNIT  RH: NORMAL

## 2024-06-06 PROCEDURE — 93005 ELECTROCARDIOGRAM TRACING: CPT | Performed by: FAMILY MEDICINE

## 2024-06-06 PROCEDURE — 99232 SBSQ HOSP IP/OBS MODERATE 35: CPT | Performed by: FAMILY MEDICINE

## 2024-06-06 PROCEDURE — 93010 ELECTROCARDIOGRAM REPORT: CPT | Performed by: INTERNAL MEDICINE

## 2024-06-06 RX ORDER — POLYVINYL ALCOHOL 14 MG/ML
2 SOLUTION/ DROPS OPHTHALMIC
Status: DISCONTINUED | OUTPATIENT
Start: 2024-06-06 | End: 2024-06-07 | Stop reason: HOSPADM

## 2024-06-06 RX ORDER — PANTOPRAZOLE SODIUM 40 MG/1
40 TABLET, DELAYED RELEASE ORAL
Status: DISCONTINUED | OUTPATIENT
Start: 2024-06-07 | End: 2024-06-07 | Stop reason: HOSPADM

## 2024-06-06 RX ADMIN — RISPERIDONE 0.25 MG: 0.25 TABLET ORAL at 20:48

## 2024-06-06 RX ADMIN — RISPERIDONE 0.25 MG: 0.25 TABLET ORAL at 08:50

## 2024-06-06 RX ADMIN — PANTOPRAZOLE SODIUM 40 MG: 40 INJECTION, POWDER, FOR SOLUTION INTRAVENOUS at 17:43

## 2024-06-06 RX ADMIN — PANTOPRAZOLE SODIUM 40 MG: 40 INJECTION, POWDER, FOR SOLUTION INTRAVENOUS at 08:50

## 2024-06-06 RX ADMIN — Medication 10 ML: at 08:50

## 2024-06-06 RX ADMIN — ATORVASTATIN CALCIUM 40 MG: 40 TABLET, FILM COATED ORAL at 08:50

## 2024-06-06 RX ADMIN — MIRTAZAPINE 7.5 MG: 15 TABLET, FILM COATED ORAL at 20:48

## 2024-06-06 NOTE — PLAN OF CARE
Problem: Adult Inpatient Plan of Care  Goal: Plan of Care Review  Outcome: Ongoing, Not Progressing  Goal: Patient-Specific Goal (Individualized)  Outcome: Ongoing, Not Progressing  Goal: Absence of Hospital-Acquired Illness or Injury  Outcome: Ongoing, Not Progressing  Intervention: Identify and Manage Fall Risk  Recent Flowsheet Documentation  Taken 6/6/2024 1600 by Artemio Daley RN  Safety Promotion/Fall Prevention:   activity supervised   assistive device/personal items within reach   clutter free environment maintained   fall prevention program maintained   lighting adjusted   nonskid shoes/slippers when out of bed   room organization consistent   safety round/check completed  Taken 6/6/2024 1200 by Artemio Daley RN  Safety Promotion/Fall Prevention:   activity supervised   assistive device/personal items within reach   clutter free environment maintained   fall prevention program maintained   lighting adjusted   nonskid shoes/slippers when out of bed   room organization consistent   safety round/check completed  Taken 6/6/2024 0800 by Artemio Daley RN  Safety Promotion/Fall Prevention:   activity supervised   assistive device/personal items within reach   clutter free environment maintained   fall prevention program maintained   lighting adjusted   nonskid shoes/slippers when out of bed   room organization consistent   safety round/check completed  Intervention: Prevent Skin Injury  Recent Flowsheet Documentation  Taken 6/6/2024 1600 by Artemio Daley RN  Body Position:   turned   right  Skin Protection:   adhesive use limited   incontinence pads utilized   transparent dressing maintained   tubing/devices free from skin contact  Taken 6/6/2024 1200 by Artemio Daley RN  Body Position: weight shifting  Skin Protection:   adhesive use limited   incontinence pads utilized   transparent dressing maintained   tubing/devices free from skin contact  Taken 6/6/2024 0800 by Artemio Daley  RN  Body Position:   turned   left  Skin Protection:   adhesive use limited   incontinence pads utilized   skin-to-device areas padded   transparent dressing maintained   tubing/devices free from skin contact  Intervention: Prevent and Manage VTE (Venous Thromboembolism) Risk  Recent Flowsheet Documentation  Taken 6/6/2024 1600 by Artemio Daley RN  Activity Management: bedrest  Taken 6/6/2024 1200 by Artemio Daley RN  Activity Management: bedrest  Taken 6/6/2024 0800 by Artemio Daley RN  Activity Management: bedrest  Range of Motion: ROM (range of motion) performed  Intervention: Prevent Infection  Recent Flowsheet Documentation  Taken 6/6/2024 1600 by Artemio Daley RN  Infection Prevention:   environmental surveillance performed   hand hygiene promoted   rest/sleep promoted  Taken 6/6/2024 1200 by Artemio Daley RN  Infection Prevention:   environmental surveillance performed   hand hygiene promoted   rest/sleep promoted  Taken 6/6/2024 0800 by Artemio Daley RN  Infection Prevention:   environmental surveillance performed   hand hygiene promoted   rest/sleep promoted  Goal: Optimal Comfort and Wellbeing  Outcome: Ongoing, Not Progressing  Goal: Readiness for Transition of Care  Outcome: Ongoing, Not Progressing     Problem: Skin Injury Risk Increased  Goal: Skin Health and Integrity  Outcome: Ongoing, Not Progressing  Intervention: Optimize Skin Protection  Recent Flowsheet Documentation  Taken 6/6/2024 1600 by Artemio Daley RN  Pressure Reduction Techniques:   heels elevated off bed   positioned off wounds   pressure points protected   weight shift assistance provided  Head of Bed (HOB) Positioning: HOB at 30 degrees  Pressure Reduction Devices:   heel offloading device utilized   positioning supports utilized   pressure-redistributing mattress utilized  Skin Protection:   adhesive use limited   incontinence pads utilized   transparent dressing maintained   tubing/devices free  from skin contact  Taken 6/6/2024 1200 by Artemio Daley RN  Pressure Reduction Techniques:   weight shift assistance provided   heels elevated off bed   positioned off wounds   pressure points protected  Head of Bed (HOB) Positioning: HOB at 30 degrees  Pressure Reduction Devices:   heel offloading device utilized   positioning supports utilized   pressure-redistributing mattress utilized  Skin Protection:   adhesive use limited   incontinence pads utilized   transparent dressing maintained   tubing/devices free from skin contact  Taken 6/6/2024 0800 by Artemio Daley RN  Pressure Reduction Techniques: frequent weight shift encouraged  Head of Bed (HOB) Positioning: HOB at 30 degrees  Pressure Reduction Devices:   heel offloading device utilized   positioning supports utilized   pressure-redistributing mattress utilized  Skin Protection:   adhesive use limited   incontinence pads utilized   skin-to-device areas padded   transparent dressing maintained   tubing/devices free from skin contact     Problem: Fall Injury Risk  Goal: Absence of Fall and Fall-Related Injury  Outcome: Ongoing, Not Progressing  Intervention: Identify and Manage Contributors  Recent Flowsheet Documentation  Taken 6/6/2024 1600 by Artemio Daley RN  Medication Review/Management: medications reviewed  Taken 6/6/2024 1200 by Artemio Daley RN  Medication Review/Management: medications reviewed  Taken 6/6/2024 0800 by Artemio Daley RN  Medication Review/Management: medications reviewed  Intervention: Promote Injury-Free Environment  Recent Flowsheet Documentation  Taken 6/6/2024 1600 by Artemio Daley RN  Safety Promotion/Fall Prevention:   activity supervised   assistive device/personal items within reach   clutter free environment maintained   fall prevention program maintained   lighting adjusted   nonskid shoes/slippers when out of bed   room organization consistent   safety round/check completed  Taken 6/6/2024 1200  by Artemio Daley, RN  Safety Promotion/Fall Prevention:   activity supervised   assistive device/personal items within reach   clutter free environment maintained   fall prevention program maintained   lighting adjusted   nonskid shoes/slippers when out of bed   room organization consistent   safety round/check completed  Taken 6/6/2024 0800 by Artemio Daley, RN  Safety Promotion/Fall Prevention:   activity supervised   assistive device/personal items within reach   clutter free environment maintained   fall prevention program maintained   lighting adjusted   nonskid shoes/slippers when out of bed   room organization consistent   safety round/check completed   Goal Outcome Evaluation:   Plan of care reviewed with: Patient

## 2024-06-06 NOTE — NURSING NOTE
Woc consulted for pressure injury on coccyx.    There is no open skin on the sacrococcygeal area.    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  If it was nonblanching this would make it a stage I.  Stage I's are not a Woc consult.    Per [facility] policy please just offload and reassess in 24 hours.    Allevyn sacral dressing was in place.  As well as Allevyn heel dressings were in place.    Patient would benefit from specialty bed.    Specialty Isotoner mattress with pump #1 ordered.    Woc will sign off.

## 2024-06-06 NOTE — CONSULTS
Continued Stay Note  [facility]     Patient Name: Mónica Carrillo  MRN: [id number]  Today's Date: 6/6/2024    Admit Date: 6/5/2024    Plan: Return to [facility]   Discharge Plan       Row Name 06/06/24 2139       Plan    Plan Return to [facility]    Plan Comments Hospice referral received, chart reviewed. Visit made to pt, pt appeared to be sleeping, no family present. Telephone call to pt's son Tiago, Tiago's wife on the phone also. Family aware of the hospice referral, stated pt lives at [facility] and had been approved by medicare to receive occupation therapy one or two days prior to pt coming to the hospital. Informed family a pt is unable to have therapy and hospice at the same time. Pt can return to the facility, have therapy, when pt is no longer participating in therapy hospice can make a visit to pt and assess for hospice. Family agreeable to this plan, the home hospice team will make a follow up call to Tiago 2 weeks post discharge. Provided Tiago with the hospice 24 hr number to call should pt finish therapy before the 2 weeks. Message sent to pt's care team regarding discharge plans. Please call [phone] if can be of further assistance.      Row Name 06/06/24 1614       Plan    Plan       Plan Comments                    Discharge Codes    No documentation.                 Expected Discharge Date and Time       Expected Discharge Date Expected Discharge Time    Jun 7, 2024               Yadi Denney RN

## 2024-06-06 NOTE — PROGRESS NOTES
"    [facility] Medicine Services  PROGRESS NOTE    Patient Name: Mónica Carrillo  : 1945  MRN: [id number]    Date of Admission: 2024  Primary Care Physician: Molina Domínguez MD    Subjective   Subjective     CC:  Anemia    HPI:  Patient is an 79-year-old resident with severe dementia at a nursing home who presented with abnormal labs with hemoglobin 6.4.  She received 1 unit of packed red blood cells.  I discussed further workup and treatment options with her daughter Hollie.  Hollie discussed with patient's other family members in the family has decided to \"leave her alone\" indicating no further workup, no further blood transfusions.  Family is interested in hospice referral as well.  The patient is unable to tell me where she is or who she is.  When asked if she was hungry she said no.  I tried to offer her food but she declined.      Objective   Objective     Vital Signs:   Temp:  [98.1 °F (36.7 °C)-99 °F (37.2 °C)] 99 °F (37.2 °C)  Heart Rate:  [] 81  Resp:  [14-18] 18  BP: (102-155)/() 155/72     Physical Exam:  Constitutional: No acute distress, awake  HENT: NCAT, mucous membranes moist  Respiratory: Decreased breath sounds bilaterally, respiratory effort normal   Cardiovascular: RRR  Gastrointestinal: Positive bowel sounds, soft, nontender, nondistended  Musculoskeletal: Lying in bed  Psychiatric: Calm, lying in bed  Neurologic: Not oriented to self or place, generally weak lying in bed speech clear  Skin: No rashes      Results Reviewed:  LAB RESULTS:      Lab 24  1301   WBC  --  9.41   HEMOGLOBIN 7.9* 7.2*   HEMATOCRIT 28.5* 26.5*   PLATELETS  --  442   NEUTROS ABS  --  5.57   IMMATURE GRANS (ABS)  --  0.03   LYMPHS ABS  --  2.25   MONOS ABS  --  0.57   EOS ABS  --  0.93*   MCV  --  76.8*         Lab 24  1301   SODIUM 139   POTASSIUM 4.1   CHLORIDE 103   CO2 30.0*   ANION GAP 6.0   BUN 8   CREATININE 0.72   EGFR 84.1   GLUCOSE 116* "   CALCIUM 9.1   MAGNESIUM 1.9   TSH 1.440         Lab 06/05/24  1301   TOTAL PROTEIN 8.1   ALBUMIN 2.8*   GLOBULIN 5.3   ALT (SGPT) <5   AST (SGOT) 10   BILIRUBIN 0.2   ALK PHOS 97         Lab 06/05/24 2039 06/05/24  1301   HSTROP T 39* 55*             Lab 06/05/24  1357 06/05/24  1309 06/05/24  1309 06/05/24  1301   IRON  --   --   --  16*   IRON SATURATION (TSAT)  --   --   --  6*   TIBC  --   --   --  259*   TRANSFERRIN  --   --   --  174*   FERRITIN  --   --   --  47.40   ABO TYPING O   < > O  --    RH TYPING Positive   < > Positive  --    ANTIBODY SCREEN  --   --  Negative  --     < > = values in this interval not displayed.         Brief Urine Lab Results  (Last result in the past 365 days)        Color   Clarity   Blood   Leuk Est   Nitrite   Protein   CREAT   Urine HCG        06/05/24 1502 Yellow   Cloudy   Trace   Large (3+)   Negative   Negative                   Microbiology Results Abnormal       None            XR Chest 1 View    Result Date: 6/5/2024  XR CHEST 1 VW Date of Exam: 6/5/2024 12:48 PM EDT Indication: Weak/Dizzy/AMS triage protocol Comparison: 7/17/2022 Findings: The cardiomediastinal silhouette is within normal limits. Chronic left basilar scarring. No new consolidation, pneumothorax, or significant pleural effusion. Osseous structures grossly intact.     Impression: Impression: No acute process. Electronically Signed: John Hope MD  6/5/2024 1:24 PM EDT  Workstation ID: [id number]         Current medications:  Scheduled Meds:[Held by provider] aspirin, 81 mg, Oral, QAM  atorvastatin, 40 mg, Oral, QAM  ferrous sulfate, 325 mg, Oral, Every Other Day  levothyroxine, 50 mcg, Oral, Daily  mirtazapine, 7.5 mg, Oral, Nightly  pantoprazole, 40 mg, Intravenous, BID AC  risperiDONE, 0.25 mg, Oral, BID  sodium chloride, 10 mL, Intravenous, Q12H      Continuous Infusions:   PRN Meds:.  senna-docusate sodium **AND** polyethylene glycol **AND** bisacodyl **AND** bisacodyl    Calcium Replacement -  "Follow Nurse / BPA Driven Protocol    Magnesium Standard Dose Replacement - Follow Nurse / BPA Driven Protocol    ondansetron    Phosphorus Replacement - Follow Nurse / BPA Driven Protocol    Potassium Replacement - Follow Nurse / BPA Driven Protocol    sodium chloride    sodium chloride    sodium chloride    Assessment & Plan   Assessment & Plan     Active Hospital Problems    Diagnosis  POA    **Symptomatic anemia [D64.9]  Yes    Essential hypertension [I10]  Yes    Hypothyroidism [E03.9]  Yes    Hyperlipidemia [E78.5]  Yes    Dementia [F03.90]  Yes      Resolved Hospital Problems   No resolved problems to display.        Brief Hospital Course to date:  Mónica Carrillo is a 79 y.o. female with past history of severe dementia, currently a resident at [facility], history of MACK, hypertension, hypothyroidism who presents to the ED with generalized weakness and Hg of 6.4. Daughter reports not eating for several weeks.      Symptomatic iron deficiency anemia  -Concern for possible blood loss, Hemoccult is positive, patient however was history of MACK  -Hg was 6.4, then 7.2 here baseline from chart review her baseline seems to be~10  -Noted iron deficiency  -continue Protonix IV twice daily  -1 unit PRBC transfused on 6/5  -Discussed goals of care with patient's daughter Hollie who discussed with other family members and has decided to proceed with \"leaving her alone\"; interested in hospice referral so consult placed     Hypertension  -BP currently stable, hold home hypertensives     Hypothyroidism  -Resume Synthroid     Alzheimer's dementia, severe  Anorexia  -Continue Remeron  -Continue risperidone  -Pending hospice referral/discussion    Expected Discharge Location and Transportation: Nursing facility  Expected Discharge   Expected Discharge Date: 6/7/2024; Expected Discharge Time:      DVT prophylaxis:  Mechanical DVT prophylaxis orders are present.         AM-PAC 6 Clicks Score (PT): 6 (06/05/24 7065)    CODE STATUS: "   Code Status and Medical Interventions:   Ordered at: 06/05/24 1621     Medical Intervention Limits:    No intubation (DNI)     Code Status (Patient has no pulse and is not breathing):    No CPR (Do Not Attempt to Resuscitate)     Medical Interventions (Patient has pulse or is breathing):    Limited Support       Marilyn Floyd MD  06/06/24

## 2024-06-06 NOTE — CASE MANAGEMENT/SOCIAL WORK
Discharge Planning Assessment  [facility]     Patient Name: Mónica Carrillo  MRN: [id number]  Today's Date: 6/6/2024    Admit Date: 6/5/2024    Plan: LTC   Discharge Needs Assessment       Row Name 06/06/24 1505       Living Environment    People in Home facility resident    Current Living Arrangements other (see comments)  Long term care resident at [facility]    Potentially Unsafe Housing Conditions none    In the past 12 months has the electric, gas, oil, or water company threatened to shut off services in your home? No    Primary Care Provided by other (see comments)  facility    Provides Primary Care For no one, unable/limited ability to care for self    Family Caregiver if Needed none    Quality of Family Relationships involved    Able to Return to Prior Arrangements yes       Resource/Environmental Concerns    Resource/Environmental Concerns none    Transportation Concerns none       Transportation Needs    In the past 12 months, has lack of transportation kept you from medical appointments or from getting medications? no    In the past 12 months, has lack of transportation kept you from meetings, work, or from getting things needed for daily living? No       Food Insecurity    Within the past 12 months, you worried that your food would run out before you got the money to buy more. Never true    Within the past 12 months, the food you bought just didn't last and you didn't have money to get more. Never true       Transition Planning    Patient/Family Anticipates Transition to long-term care facility    Patient/Family Anticipated Services at Transition other (see comments)  back to LTC    Transportation Anticipated health plan transportation       Discharge Needs Assessment    Readmission Within the Last 30 Days no previous admission in last 30 days    Equipment Currently Used at Home wheelchair    Concerns to be Addressed discharge planning    Anticipated Changes Related to Illness none    Equipment Needed  After Discharge none                   Discharge Plan       Row Name 06/06/24 1509       Plan    Plan LTC    Patient/Family in Agreement with Plan yes    Plan Comments Attempted bedside visit with Ms. Carrillo twice, but she was sleeping. Called son's phone, and his wife answered and gave information for initial discharge planning. Ms. Carrillo lives in long term care at [facility].  called [facility] admissions and confirmed that patient can return at discharge. Daughter in law stated the family want Ms. Carrillo to return to [facility] at discharge. Daughter in law asked that hospital staff call Tiago instead of his sister Hollie, because he is POA. Daughter in law reported that patient requires a wheelchair for mobility and spends most of the time in the bed. She does not use oxygen. She has been working with OT at [facility] on her hand, and according to daughter in law, was making progress.  will continue to follow plan of care and assist with discharge planning needs as indicated.    Final Discharge Disposition Code 04 - intermediate care facility                  Continued Care and Services - Admitted Since 6/5/2024    No active coordination exists for this encounter.       Expected Discharge Date and Time       Expected Discharge Date Expected Discharge Time    Jun 7, 2024            Demographic Summary       Row Name 06/06/24 1504       General Information    Admission Type inpatient    [facility]rived From [facility]    Referral Source admission list    Reason for Consult discharge planning    Preferred Language English       Contact Information    Permission Granted to Share Info With     Contact Information Obtained for     Contact Information Comments Hollie Wei Daughter [phone]    Tiago Soni [employer] Son [phone]                   Functional Status       Row Name 06/06/24 1505       Functional Status    Usual Activity Tolerance fair    Current  Activity Tolerance other (see comments)  tiny       Physical Activity    On average, how many days per week do you engage in moderate to strenuous exercise (like a brisk walk)? 0 days    On average, how many minutes do you engage in exercise at this level? 0 min    Number of minutes of exercise per week 0       Assessment of Health Literacy    How often do you have someone help you read hospital materials? Always    How often do you have problems learning about your medical condition because of difficulty understanding written information? Often    How often do you have a problem understanding what is told to you about your medical condition? Often    How confident are you filling out medical forms by yourself? Not at all    Health Literacy Fair       Functional Status, IADL    Medications assistive person    Meal Preparation assistive person    Housekeeping assistive person    Laundry assistive person    Shopping assistive person       Mental Status Summary    Recent Changes in Mental Status/Cognitive Functioning unable to assess                   Psychosocial    No documentation.                  Abuse/Neglect    No documentation.                  Legal    No documentation.                  Substance Abuse    No documentation.                  Patient Forms    No documentation.                     Kaitlin Lr RN

## 2024-06-07 VITALS
OXYGEN SATURATION: 100 % | WEIGHT: 156.7 LBS | TEMPERATURE: 97.7 F | HEART RATE: 90 BPM | SYSTOLIC BLOOD PRESSURE: 143 MMHG | RESPIRATION RATE: 18 BRPM | BODY MASS INDEX: 23.75 KG/M2 | HEIGHT: 68 IN | DIASTOLIC BLOOD PRESSURE: 78 MMHG

## 2024-06-07 PROCEDURE — 97535 SELF CARE MNGMENT TRAINING: CPT

## 2024-06-07 PROCEDURE — 97166 OT EVAL MOD COMPLEX 45 MIN: CPT

## 2024-06-07 PROCEDURE — 99239 HOSP IP/OBS DSCHRG MGMT >30: CPT | Performed by: NURSE PRACTITIONER

## 2024-06-07 PROCEDURE — 97162 PT EVAL MOD COMPLEX 30 MIN: CPT

## 2024-06-07 PROCEDURE — 97530 THERAPEUTIC ACTIVITIES: CPT

## 2024-06-07 RX ORDER — PANTOPRAZOLE SODIUM 40 MG/1
40 TABLET, DELAYED RELEASE ORAL
Start: 2024-06-07

## 2024-06-07 RX ORDER — POLYVINYL ALCOHOL 14 MG/ML
2 SOLUTION/ DROPS OPHTHALMIC
Start: 2024-06-07

## 2024-06-07 RX ADMIN — RISPERIDONE 0.25 MG: 0.25 TABLET ORAL at 09:36

## 2024-06-07 RX ADMIN — ATORVASTATIN CALCIUM 40 MG: 40 TABLET, FILM COATED ORAL at 09:36

## 2024-06-07 RX ADMIN — PANTOPRAZOLE SODIUM 40 MG: 40 TABLET, DELAYED RELEASE ORAL at 09:36

## 2024-06-07 RX ADMIN — FERROUS SULFATE TAB 325 MG (65 MG ELEMENTAL FE) 325 MG: 325 (65 FE) TAB at 09:36

## 2024-06-07 RX ADMIN — LEVOTHYROXINE SODIUM 50 MCG: 0.05 TABLET ORAL at 05:09

## 2024-06-07 NOTE — DISCHARGE SUMMARY
"    Psychiatric Medicine Services  DISCHARGE SUMMARY    Patient Name: Mónica Carrillo  : 1945  MRN: [id number]    Date of Admission: 2024 12:35 PM  Date of Discharge:  2024  Primary Care Physician: Molina Domínguez MD    Consults       No orders found from 2024 to 2024.            Hospital Course     Active Hospital Problems    Diagnosis  POA    **Symptomatic anemia [D64.9]  Yes    Essential hypertension [I10]  Yes    Hypothyroidism [E03.9]  Yes    Hyperlipidemia [E78.5]  Yes    Dementia [F03.90]  Yes      Resolved Hospital Problems   No resolved problems to display.      Hospital Course:  Mónica Carrillo is a 79 y.o. female with past history of severe dementia, currently a resident at [facility], history of MACK, hypertension, hypothyroidism who presents to the ED with generalized weakness and Hg of 6.4. Daughter reports not eating for several weeks.      Symptomatic iron deficiency anemia  -Concern for possible blood loss, Hemoccult is positive, patient however was history of MACK  -Hg was 6.4, then 7.2 here baseline from chart review her baseline seems to be~10  -Noted iron deficiency; started PO iron  -continue Protonix IV twice daily  -1 unit PRBC transfused on  with improvement  -Discussed goals of care with patient's daughter Hollie who discussed with other family members and has decided to proceed with \"leaving her alone\"; interested in hospice but family decided that they want to try OT then hospice.     Hypertension  -BP stable to mildly hypertensive  --Restart metoprolol     Hypothyroidism  -Resume Synthroid     Alzheimer's dementia, severe  Anorexia  -Continue Remeron  -Continue risperidone    Patient is medically ready to go back to facility and will be transported by ambulance.  Discharge follow-up recommendations and appointments are listed below and in the AVS.    Discharge Follow Up Recommendations for outpatient labs/diagnostics:  -- Follow-up with " "facility provider in 1 to 2 days  --Start occupational therapy at facility (insurance approved prior to hospitalization)  --Family spoke with hospice, but on hold until patient no longer gets therapy  --Start pantoprazole 40 mg 2 times a day  -- Use liquifilm 1.4% 2 x 5 every 1 hour as needed for dry eyes  --Stop taking aspirin  --Stop taking Norco and tramadol    Day of Discharge     HPI:   Patient sitting up in chair sleeping but easily arousable to verbal stimuli.  Patient is mildly confused and knows who she is and that she is in a \"hospital\" but can give no other information.  No complaints at this time.  Stated she wanted to go back to sleep.    Vital Signs:   Temp:  [98.1 °F (36.7 °C)-98.9 °F (37.2 °C)] 98.1 °F (36.7 °C)  Heart Rate:  [] 97  Resp:  [16-18] 16  BP: (142-164)/(70-85) 142/79    Physical Exam:  Constitutional: Sleeping but easily arousable frail chronically ill-appearing elderly female sitting up in chair in NAD  ENT: Pink, mildly dry mucous membranes   Respiratory: Nonlabored, symmetrical chest expansion, CTAB, RA  Cardiovascular: RRR, no M/R/G  Gastrointestinal: Soft, NT, ND +BS  Musculoskeletal: CHAVEZ; no LE edema bilaterally  Neurologic: Oriented to self and \"hospital\", follows all commands, speech clear  Skin: No rashes on exposed skin  Psychiatric: Pleasant and cooperative; normal affect      Pertinent  and/or Most Recent Results     LAB RESULTS:      Lab 06/05/24 2039 06/05/24  1301   WBC  --  9.41   HEMOGLOBIN 7.9* 7.2*   HEMATOCRIT 28.5* 26.5*   PLATELETS  --  442   NEUTROS ABS  --  5.57   IMMATURE GRANS (ABS)  --  0.03   LYMPHS ABS  --  2.25   MONOS ABS  --  0.57   EOS ABS  --  0.93*   MCV  --  76.8*         Lab 06/05/24  1301   SODIUM 139   POTASSIUM 4.1   CHLORIDE 103   CO2 30.0*   ANION GAP 6.0   BUN 8   CREATININE 0.72   EGFR 84.1   GLUCOSE 116*   CALCIUM 9.1   MAGNESIUM 1.9   TSH 1.440         Lab 06/05/24  1301   TOTAL PROTEIN 8.1   ALBUMIN 2.8*   GLOBULIN 5.3   ALT (SGPT) <5 "   AST (SGOT) 10   BILIRUBIN 0.2   ALK PHOS 97         Lab 06/05/24 2039 06/05/24  1301   HSTROP T 39* 55*             Lab 06/05/24  1357 06/05/24  1309 06/05/24  1309 06/05/24  1301   IRON  --   --   --  16*   IRON SATURATION (TSAT)  --   --   --  6*   TIBC  --   --   --  259*   TRANSFERRIN  --   --   --  174*   FERRITIN  --   --   --  47.40   ABO TYPING O   < > O  --    RH TYPING Positive   < > Positive  --    ANTIBODY SCREEN  --   --  Negative  --     < > = values in this interval not displayed.         Brief Urine Lab Results  (Last result in the past 365 days)        Color   Clarity   Blood   Leuk Est   Nitrite   Protein   CREAT   Urine HCG        06/05/24 1502 Yellow   Cloudy   Trace   Large (3+)   Negative   Negative                 Microbiology Results (last 10 days)       ** No results found for the last 240 hours. **            XR Chest 1 View    Result Date: 6/5/2024  XR CHEST 1 VW Date of Exam: 6/5/2024 12:48 PM EDT Indication: Weak/Dizzy/AMS triage protocol Comparison: 7/17/2022 Findings: The cardiomediastinal silhouette is within normal limits. Chronic left basilar scarring. No new consolidation, pneumothorax, or significant pleural effusion. Osseous structures grossly intact.     Impression: No acute process. Electronically Signed: John Hope MD  6/5/2024 1:24 PM EDT  Workstation ID: [id number]                 Plan for Follow-up of Pending Labs/Results:     Discharge Details        Discharge Medications        New Medications        Instructions Start Date   pantoprazole 40 MG EC tablet  Commonly known as: PROTONIX   40 mg, Oral, 2 Times Daily Before Meals      polyvinyl alcohol 1.4 % ophthalmic solution  Commonly known as: LIQUIFILM   2 drops, Both Eyes, Every 1 Hour PRN             Continue These Medications        Instructions Start Date   acetaminophen 500 MG tablet  Commonly known as: TYLENOL   500 mg, Oral, Every 6 Hours PRN      atorvastatin 40 MG tablet  Commonly known as: LIPITOR   40 mg,  Oral, Every Morning      docusate sodium 100 MG capsule  Commonly known as: COLACE   100 mg, Oral, 2 Times Daily      Ergocalciferol 50 MCG (2000 UT) capsule   1 capsule, Oral, Every Morning      ferrous sulfate 325 (65 FE) MG tablet  Commonly known as: FerrouSul   325 mg, Oral, Every Other Day      folic acid 1 MG tablet  Commonly known as: FOLVITE   1 mg, Oral, Daily      levothyroxine 50 MCG tablet  Commonly known as: SYNTHROID, LEVOTHROID   50 mcg, Oral, Daily      magnesium hydroxide 400 MG/5ML suspension  Commonly known as: MILK OF MAGNESIA   30 mL, Oral, Daily PRN      metoprolol tartrate 50 MG tablet  Commonly known as: LOPRESSOR   50 mg, Oral, 2 Times Daily      mirtazapine 7.5 MG tablet  Commonly known as: REMERON   7.5 mg, Oral, Nightly      MULTIVITAMIN & MINERAL PO   1 tablet, Oral, Every Morning      risperiDONE 0.25 MG tablet  Commonly known as: risperDAL   0.25 mg, Oral, 2 Times Daily             Stop These Medications      aspirin 81 MG chewable tablet     HYDROcodone-acetaminophen 5-325 MG per tablet  Commonly known as: NORCO     traMADol 50 MG tablet  Commonly known as: ULTRAM              No Known Allergies      Discharge Disposition:  Short Term Hospital (DC - External)    Diet:  Hospital:  Diet Order   Procedures    Diet: Regular/House; Texture: Soft to Chew (NDD 3); Soft to Chew: Chopped Meat; Fluid Consistency: Thin (IDDSI 0)       Diet Instructions       Diet: Diabetic Diets; Consistent Carbohydrate; Soft to Chew (NDD 3); Chopped Meat; Thin (IDDSI 0)      Discharge Diet: Diabetic Diets    Diabetic Diet: Consistent Carbohydrate    Texture: Soft to Chew (NDD 3)    Soft to Chew: Chopped Meat    Fluid Consistency: Thin (IDDSI 0)             Activity:  Activity Instructions       Activity as Tolerated              Restrictions or Other Recommendations:  None       CODE STATUS:    Code Status and Medical Interventions:   Ordered at: 06/05/24 0131     Medical Intervention Limits:    No intubation  (DNI)     Code Status (Patient has no pulse and is not breathing):    No CPR (Do Not Attempt to Resuscitate)     Medical Interventions (Patient has pulse or is breathing):    Limited Support       Additional Instructions for the Follow-ups that You Need to Schedule       Discharge Follow-up with Specified Provider: Follow-up with facility provider in 1 to 2 days   As directed      To: Follow-up with facility provider in 1 to 2 days                      PAOLO Montanez  06/07/24      Time Spent on Discharge:  I spent  35  minutes on this discharge activity which included: face-to-face encounter with the patient, reviewing the data in the system, coordination of the care with the nursing staff as well as consultants, documentation, and entering orders.

## 2024-06-07 NOTE — DISCHARGE PLACEMENT REQUEST
"Henry Walls (79 y.o. Female)    Referred by Dr. Marilyn Foy  PCP- Dr. Molina Domínguez   Pt lives at [facility] in [place]  Dx-Dementia (type unknown)Symptomatic anemia   Date of Birth   1945    Social Security Number       Address   [address] [facility] [address]    Home Phone   [phone]    MRN   [id number]       Protestant   Taoist    Marital Status                               Admission Date   6/5/24    Admission Type   Emergency    Admitting Provider   Marilyn Floyd MD    Attending Provider   Marilyn Floyd MD    Department, Room/Bed   [facility] 5H, S568/1       Discharge Date       Discharge Disposition       Discharge Destination                                 Attending Provider: Marilyn Floyd MD    Allergies: No Known Allergies    Isolation: None   Infection: None   Code Status: No CPR    Ht: 172.7 cm (68\")   Wt: 71.1 kg (156 lb 11.2 oz)    Admission Cmt: None   Principal Problem: Symptomatic anemia [D64.9]                   Active Insurance as of 6/5/2024       Primary Coverage       Payor Plan Insurance Group Employer/Plan Group    MEDICARE MEDICARE A & B        Payor Plan Address Payor Plan Phone Number Payor Plan Fax Number Effective Dates    [address] [phone]  3/1/2012 - None Entered    [facility] [id number]         Subscriber Name Subscriber Birth Date Member ID       HENRY WALLS 1945 [id number]               Secondary Coverage       Payor Plan Insurance Group Employer/Plan Group    [place]        Payor Plan Address Payor Plan Phone Number Payor Plan Fax Number Effective Dates    [address] [phone]  4/3/2022 - None Entered    [facility] [id number]         Subscriber Name Subscriber Birth Date Member ID       HENRY WALLS 1945 [id number]                     Emergency Contacts        (Rel.) Home Phone Work Phone Mobile Phone    Hollie Wei (Daughter) [phone] -- " --    Tiago Soni (Son) [phone] -- --              Emergency Contact Information       Name Relation Home Work Mobile    Hollie Wei Daughter [phone]      Tiago Soni Son [phone]            Insurance Information                  MEDICARE/MEDICARE A & B Phone: [phone]    Subscriber: Mónica Carrillo Subscriber#: [id number]    Group#: -- Precert#: --        [place] Phone: [phone]    Subscriber: Mónica Carrillo Subscriber#: [id number]    Group#: -- Precert#: [id number]             History & Physical        Kyra Lobo MD at [facility]              [facility] Medicine Services  HISTORY AND PHYSICAL    Patient Name: Mónica Carrillo  : 1945  MRN: [id number]  Primary Care Physician: Molina Domínguez MD  Date of admission: 2024      Subjective  Subjective     Chief Complaint: Generalized weakness    HPI:  Mónica Carrillo is a 79 y.o. female history of dementia, prior MACK, hypertension, hypothyroidism who presents to the ED with increased fatigue, generalized weakness.  Workup at nursing home did reveal hg of 6.4 hence her presented to the ED for transfusion.  Here she is hemodynamically stable, repeat hemoglobin 7.2.  Patient has dementia unable to provide any additional information.  However from chart review, she has not had any reported hematemesis or melena.      Personal History     Past Medical History:   Diagnosis Date    Dementia     Hypertension     Mood disorder     Thyroid disease      History reviewed. No pertinent surgical history.    Family History: family history includes No Known Problems in her father and mother.     Social History:  reports that she has never smoked. She has never been exposed to tobacco smoke. She has never used smokeless tobacco. She reports that she does not drink alcohol and does not use drugs.  Social History     Social History Narrative    Not on file        Medications:  Available home medication information reviewed.  Ergocalciferol, HYDROcodone-acetaminophen, Multiple Vitamins-Minerals, acetaminophen, aspirin, atorvastatin, docusate sodium, ferrous sulfate, folic acid, levothyroxine, magnesium hydroxide, metoprolol tartrate, mirtazapine, risperiDONE, and traMADol    No Known Allergies    Objective  Objective     Vital Signs:   Temp:  [98.9 °F (37.2 °C)] 98.9 °F (37.2 °C)  Heart Rate:  [67-72] 68  Resp:  [16] 16  BP: (108-127)/(65-93) 115/77       Physical Exam   Constitutional: Awake, alert  Eyes: PERRLA, sclerae anicteric, no conjunctival injection  HENT: NCAT, mucous membranes moist  Neck: Supple, no thyromegaly, no lymphadenopathy, trachea midline  Respiratory: Clear to auscultation bilaterally, nonlabored respirations   Cardiovascular: RRR, no murmurs, rubs, or gallops, palpable pedal pulses bilaterally  Gastrointestinal: Positive bowel sounds, soft, nontender, nondistended  Musculoskeletal: No bilateral ankle edema, no clubbing or cyanosis to extremities  Psychiatric: Appropriate affect, cooperative  Neurologic: Slurred speech, pleasantly confused  Skin: No rashes     Result Review:  I have personally reviewed the results from the time of this admission to 6/5/2024 16:22 EDT and agree with these findings:  [x]  Laboratory list / accordion  []  Microbiology  []  Radiology  []  EKG/Telemetry   []  Cardiology/Vascular   []  Pathology  []  Old records  []  Other:  Most notable findings include:     LAB RESULTS:      Lab 06/05/24  1301   WBC 9.41   HEMOGLOBIN 7.2*   HEMATOCRIT 26.5*   PLATELETS 442   NEUTROS ABS 5.57   IMMATURE GRANS (ABS) 0.03   LYMPHS ABS 2.25   MONOS ABS 0.57   EOS ABS 0.93*   MCV 76.8*         Lab 06/05/24  1301   SODIUM 139   POTASSIUM 4.1   CHLORIDE 103   CO2 30.0*   ANION GAP 6.0   BUN 8   CREATININE 0.72   EGFR 84.1   GLUCOSE 116*   CALCIUM 9.1   MAGNESIUM 1.9         Lab 06/05/24  1301   TOTAL PROTEIN 8.1   ALBUMIN 2.8*   GLOBULIN 5.3    ALT (SGPT) <5   AST (SGOT) 10   BILIRUBIN 0.2   ALK PHOS 97         Lab 06/05/24  1301   HSTROP T 55*             Lab 06/05/24  1357 06/05/24  1309   ABO TYPING O O   RH TYPING Positive Positive   ANTIBODY SCREEN  --  Negative         UA          6/5/2024    15:02   Urinalysis   Squamous Epithelial Cells, UA Too Numerous to Count    Specific Gravity, UA 1.015    Ketones, UA Negative    Blood, UA Trace    Leukocytes, UA Large (3+)    Nitrite, UA Negative    RBC, UA 0-2    WBC, UA Too Numerous to Count    Bacteria, UA 1+        Microbiology Results (last 10 days)       ** No results found for the last 240 hours. **            XR Chest 1 View    Result Date: 6/5/2024  XR CHEST 1 VW Date of Exam: 6/5/2024 12:48 PM EDT Indication: Weak/Dizzy/AMS triage protocol Comparison: 7/17/2022 Findings: The cardiomediastinal silhouette is within normal limits. Chronic left basilar scarring. No new consolidation, pneumothorax, or significant pleural effusion. Osseous structures grossly intact.     Impression: Impression: No acute process. Electronically Signed: John Hope MD  6/5/2024 1:24 PM EDT  Workstation ID: [id number]       Assessment & Plan  Assessment & Plan       Symptomatic anemia    Essential hypertension    Hypothyroidism    Hyperlipidemia    Dementia    79-year-old female history of dementia, currently a resident at [facility], history of MACK, hypertension, hypothyroidism who presents to the ED with generalized weakness and Hg of 6.4    Symptomatic anemia anemia,  Suspected blood loss anemia  -Concern for possible blood loss, Hemoccult is positive, patient however was history of MACK  -Hg was 6.4 from yesterdays labs, was 7.2 here baseline from chart review her baseline seems to be~10  -Follow-up iron profile, continue Protonix IV twice daily  -1 unit PRBC has been ordered in the ED continue to trend H&H  -GI consult has been placed, keep n.p.o. after midnight for possible EGD    Hypertension  -BP currently stable,  hold home hypertensives    Hypothyroidism  -Resume Synthroid    Alzheimer's dementia  -Continue Remeron  -Continue risperidone    DVT prophylaxis:  Mechanical DVT prophylaxis orders are signed and held.      CODE STATUS:    Code Status and Medical Interventions:   Ordered at: 06/05/24 1621     Medical Intervention Limits:    No intubation (DNI)     Code Status (Patient has no pulse and is not breathing):    No CPR (Do Not Attempt to Resuscitate)     Medical Interventions (Patient has pulse or is breathing):    Limited Support     Expected Discharge   Expected Discharge Date: 6/7/2024; Expected Discharge Time:      Kyra Lobo MD  06/05/24     Electronically signed by Kyra Lobo MD at 06/05/24 1622       Current Facility-Administered Medications   Medication Dose Route Frequency Provider Last Rate Last Admin    [Held by provider] aspirin chewable tablet 81 mg  81 mg Oral QAM Kyra Lobo MD        atorvastatin (LIPITOR) tablet 40 mg  40 mg Oral Kyra Long MD   40 mg at 06/06/24 0850    sennosides-docusate (PERICOLACE) 8.6-50 MG per tablet 2 tablet  2 tablet Oral BID PRN Kyra Lobo MD        And    polyethylene glycol (MIRALAX) packet 17 g  17 g Oral Daily PRN Kyra Lobo MD        And    bisacodyl (DULCOLAX) EC tablet 5 mg  5 mg Oral Daily PRN Kyra Lobo MD        And    bisacodyl (DULCOLAX) suppository 10 mg  10 mg Rectal Daily PRN Kyra Lobo MD        Calcium Replacement - Follow Nurse / BPA Driven Protocol   Does not apply PRN Krya Lobo MD        ferrous sulfate tablet 325 mg  325 mg Oral Every Other Day Kyra Lobo MD   325 mg at 06/05/24 2113    levothyroxine (SYNTHROID, LEVOTHROID) tablet 50 mcg  50 mcg Oral Daily Kyra Lobo MD   50 mcg at 06/07/24 0509    Magnesium Standard Dose Replacement - Follow Nurse / BPA Driven Protocol   Does not apply PRN Kyra Lobo MD        mirtazapine (REMERON) tablet 7.5 mg  7.5 mg Oral Nightly Kyra Lobo MD   7.5 mg  "at 24    ondansetron (ZOFRAN) injection 4 mg  4 mg Intravenous Q6H PRN Kyra Lobo MD        pantoprazole (PROTONIX) EC tablet 40 mg  40 mg Oral BID AC Radha Eason, PAOLO        Phosphorus Replacement - Follow Nurse / BPA Driven Protocol   Does not apply PRN Kyra Lobo MD        polyvinyl alcohol (LIQUIFILM) 1.4 % ophthalmic solution 2 drop  2 drop Both Eyes Q1H PRN Radha Eason, PAOLO        Potassium Replacement - Follow Nurse / BPA Driven Protocol   Does not apply PRN Kyra Lobo MD        risperiDONE (risperDAL) tablet 0.25 mg  0.25 mg Oral BID Kyra Lobo MD   0.25 mg at 24    sodium chloride 0.9 % flush 10 mL  10 mL Intravenous PRN Tim Yanes MD        sodium chloride 0.9 % flush 10 mL  10 mL Intravenous Q12H Kyra Lobo MD   10 mL at 24 0850    sodium chloride 0.9 % flush 10 mL  10 mL Intravenous PRN Kyra Lobo MD        sodium chloride 0.9 % infusion 40 mL  40 mL Intravenous PRN Kyra Lobo MD            Physician Progress Notes (last 72 hours)        Marilyn Floyd MD at 24 1237              [facility]  PROGRESS NOTE    Patient Name: Mónica Carrillo  : 1945  MRN: [id number]    Date of Admission: 2024  Primary Care Physician: Molina Domínguez MD    Subjective   Subjective     CC:  Anemia    HPI:  Patient is an 79-year-old resident with severe dementia at a nursing home who presented with abnormal labs with hemoglobin 6.4.  She received 1 unit of packed red blood cells.  I discussed further workup and treatment options with her daughter Hollie.  Hollie discussed with patient's other family members in the family has decided to \"leave her alone\" indicating no further workup, no further blood transfusions.  Family is interested in hospice referral as well.  The patient is unable to tell me where she is or who she is.  When asked if she was hungry she said no.  I tried to offer her food " but she declined.      Objective   Objective     Vital Signs:   Temp:  [98.1 °F (36.7 °C)-99 °F (37.2 °C)] 99 °F (37.2 °C)  Heart Rate:  [] 81  Resp:  [14-18] 18  BP: (102-155)/() 155/72     Physical Exam:  Constitutional: No acute distress, awake  HENT: NCAT, mucous membranes moist  Respiratory: Decreased breath sounds bilaterally, respiratory effort normal   Cardiovascular: RRR  Gastrointestinal: Positive bowel sounds, soft, nontender, nondistended  Musculoskeletal: Lying in bed  Psychiatric: Calm, lying in bed  Neurologic: Not oriented to self or place, generally weak lying in bed speech clear  Skin: No rashes      Results Reviewed:  LAB RESULTS:      Lab 06/05/24 2039 06/05/24  1301   WBC  --  9.41   HEMOGLOBIN 7.9* 7.2*   HEMATOCRIT 28.5* 26.5*   PLATELETS  --  442   NEUTROS ABS  --  5.57   IMMATURE GRANS (ABS)  --  0.03   LYMPHS ABS  --  2.25   MONOS ABS  --  0.57   EOS ABS  --  0.93*   MCV  --  76.8*         Lab 06/05/24  1301   SODIUM 139   POTASSIUM 4.1   CHLORIDE 103   CO2 30.0*   ANION GAP 6.0   BUN 8   CREATININE 0.72   EGFR 84.1   GLUCOSE 116*   CALCIUM 9.1   MAGNESIUM 1.9   TSH 1.440         Lab 06/05/24  1301   TOTAL PROTEIN 8.1   ALBUMIN 2.8*   GLOBULIN 5.3   ALT (SGPT) <5   AST (SGOT) 10   BILIRUBIN 0.2   ALK PHOS 97         Lab 06/05/24 2039 06/05/24  1301   HSTROP T 39* 55*             Lab 06/05/24  1357 06/05/24  1309 06/05/24  1309 06/05/24  1301   IRON  --   --   --  16*   IRON SATURATION (TSAT)  --   --   --  6*   TIBC  --   --   --  259*   TRANSFERRIN  --   --   --  174*   FERRITIN  --   --   --  47.40   ABO TYPING O   < > O  --    RH TYPING Positive   < > Positive  --    ANTIBODY SCREEN  --   --  Negative  --     < > = values in this interval not displayed.         Brief Urine Lab Results  (Last result in the past 365 days)        Color   Clarity   Blood   Leuk Est   Nitrite   Protein   CREAT   Urine HCG        06/05/24 1502 Yellow   Cloudy   Trace   Large (3+)   Negative    Negative                   Microbiology Results Abnormal       None            XR Chest 1 View    Result Date: 6/5/2024  XR CHEST 1 VW Date of Exam: 6/5/2024 12:48 PM EDT Indication: Weak/Dizzy/AMS triage protocol Comparison: 7/17/2022 Findings: The cardiomediastinal silhouette is within normal limits. Chronic left basilar scarring. No new consolidation, pneumothorax, or significant pleural effusion. Osseous structures grossly intact.     Impression: Impression: No acute process. Electronically Signed: John Hope MD  6/5/2024 1:24 PM EDT  Workstation ID: [id number]         Current medications:  Scheduled Meds:[Held by provider] aspirin, 81 mg, Oral, QAM  atorvastatin, 40 mg, Oral, QAM  ferrous sulfate, 325 mg, Oral, Every Other Day  levothyroxine, 50 mcg, Oral, Daily  mirtazapine, 7.5 mg, Oral, Nightly  pantoprazole, 40 mg, Intravenous, BID AC  risperiDONE, 0.25 mg, Oral, BID  sodium chloride, 10 mL, Intravenous, Q12H      Continuous Infusions:   PRN Meds:.  senna-docusate sodium **AND** polyethylene glycol **AND** bisacodyl **AND** bisacodyl    Calcium Replacement - Follow Nurse / BPA Driven Protocol    Magnesium Standard Dose Replacement - Follow Nurse / BPA Driven Protocol    ondansetron    Phosphorus Replacement - Follow Nurse / BPA Driven Protocol    Potassium Replacement - Follow Nurse / BPA Driven Protocol    sodium chloride    sodium chloride    sodium chloride    Assessment & Plan   Assessment & Plan     Active Hospital Problems    Diagnosis  POA    **Symptomatic anemia [D64.9]  Yes    Essential hypertension [I10]  Yes    Hypothyroidism [E03.9]  Yes    Hyperlipidemia [E78.5]  Yes    Dementia [F03.90]  Yes      Resolved Hospital Problems   No resolved problems to display.        Brief Hospital Course to date:  Mónica Carrillo is a 79 y.o. female with past history of severe dementia, currently a resident at [facility], history of MACK, hypertension, hypothyroidism who presents to the ED with generalized  "weakness and Hg of 6.4. Daughter reports not eating for several weeks.      Symptomatic iron deficiency anemia  -Concern for possible blood loss, Hemoccult is positive, patient however was history of AMCK  -Hg was 6.4, then 7.2 here baseline from chart review her baseline seems to be~10  -Noted iron deficiency  -continue Protonix IV twice daily  -1 unit PRBC transfused on 6/5  -Discussed goals of care with patient's daughter Hollie who discussed with other family members and has decided to proceed with \"leaving her alone\"; interested in hospice referral so consult placed     Hypertension  -BP currently stable, hold home hypertensives     Hypothyroidism  -Resume Synthroid     Alzheimer's dementia, severe  Anorexia  -Continue Remeron  -Continue risperidone  -Pending hospice referral/discussion    Expected Discharge Location and Transportation: Nursing facility  Expected Discharge   Expected Discharge Date: 6/7/2024; Expected Discharge Time:      DVT prophylaxis:  Mechanical DVT prophylaxis orders are present.         AM-PAC 6 Clicks Score (PT): 6 (06/05/24 8162)    CODE STATUS:   Code Status and Medical Interventions:   Ordered at: 06/05/24 1621     Medical Intervention Limits:    No intubation (DNI)     Code Status (Patient has no pulse and is not breathing):    No CPR (Do Not Attempt to Resuscitate)     Medical Interventions (Patient has pulse or is breathing):    Limited Support       Marilyn Floyd MD  06/06/24        Electronically signed by Marilyn Floyd MD at 06/06/24 1243       Consult Notes (last 72 hours)  Notes from 06/04/24 0933 through 06/07/24 0933   No notes of this type exist for this encounter.       "

## 2024-06-07 NOTE — PLAN OF CARE
Goal Outcome Evaluation:  Plan of Care Reviewed With: patient           Outcome Evaluation: Pt presents with confusion, limited UE range,weakenss, and decr activity tolerance limiting independence with self care/mobility.  Pt min A to wash face, min a adapted cup to mouth,  dep x 2 with bed mobility and lift to chair.  OT will follow 3 times a week to advance pt toward increased ind with feeding/grooming tasks.  Recommend return to [facility] and continue with OT.      Anticipated Discharge Disposition (OT): other (see comments) (return to [facility] and continue OT services)

## 2024-06-07 NOTE — CASE MANAGEMENT/SOCIAL WORK
Case Management Discharge Note      Final Note: Ms. Carrillo is discharging back to [facility] today. She has stretcher transport at 1530 with  EMS. Nursing, please call report to [phone].  has faxed discharge summary and called liaison, April, with transport time. No other discharge needs were identified.         Selected Continued Care - Admitted Since 6/5/2024       Destination    No services have been selected for the patient.                Durable Medical Equipment    No services have been selected for the patient.                Dialysis/Infusion    No services have been selected for the patient.                Home Medical Care    No services have been selected for the patient.                Therapy    No services have been selected for the patient.                Community Resources    No services have been selected for the patient.                Community & DME    No services have been selected for the patient.                    Transportation Services  Ambulance: [facility]    Final Discharge Disposition Code: 04 - intermediate care facility

## 2024-06-07 NOTE — THERAPY EVALUATION
Patient Name: Mónica Carrillo  : 1945    MRN: [id number]                              Today's Date: 2024       Admit Date: 2024    Visit Dx:     ICD-10-CM ICD-9-CM   1. Acute GI bleeding  K92.2 578.9   2. Acute blood loss anemia  D62 285.1   3. Generalized weakness  R53.1 780.79     Patient Active Problem List   Diagnosis    Positive blood culture    Acute UTI (urinary tract infection)    Symptomatic anemia    Hypocalcemia    Wound of left foot    Essential hypertension    Hypothyroidism    Hyperlipidemia    Dementia    CAP (community acquired pneumonia)    Altered mental status     Past Medical History:   Diagnosis Date    Dementia     Hypertension     Mood disorder     Thyroid disease      History reviewed. No pertinent surgical history.   General Information       Row Name 2444          OT Time and Intention    Document Type evaluation  -AC     Mode of Treatment occupational therapy  -       Row Name 2444          General Information    Patient Profile Reviewed yes  -AC     Prior Level of Function min assist:;feeding;mod assist:;grooming;max assist:;dressing;bathing;dependent:;transfer  pt poor historian  -AC     Existing Precautions/Restrictions fall  dementia, non ambulatory  -AC     Barriers to Rehab medically complex;previous functional deficit;cognitive status;contractures;physical barrier  -       Row Name 2444          Living Environment    People in Home facility resident  -       Row Name 2444          Home Main Entrance    Number of Stairs, Main Entrance none  -       Row Name 2444          Stairs Within Home, Primary    Number of Stairs, Within Home, Primary none  -       Row Name 2444          Cognition    Orientation Status (Cognition) oriented to;person;other (see comments);disoriented to;time  able to state she was in the hospital given choices  -       Row Name 2444          Safety Issues, Functional  Mobility    Safety Issues Affecting Function (Mobility) awareness of need for assistance;friction/shear risk;insight into deficits/self-awareness;safety precaution awareness;safety precautions follow-through/compliance;sequencing abilities;problem-solving  -     Impairments Affecting Function (Mobility) balance;cognition;coordination;grasp;muscle tone abnormal;range of motion (ROM);strength  -     Cognitive Impairments, Mobility Safety/Performance awareness, need for assistance;insight into deficits/self-awareness;problem-solving/reasoning;safety precaution awareness;safety precaution follow-through;sequencing abilities  -               User Key  (r) = Recorded By, (t) = Taken By, (c) = Cosigned By      Initials Name Provider Type     Radha Grijalva OT Occupational Therapist                     Mobility/ADL's       Row Name 06/07/24 0944          Bed Mobility    Bed Mobility rolling left;rolling right  -     Rolling Left [place] (Bed Mobility) verbal cues;dependent (less than 25% patient effort);2 person assist  -     Rolling Right [place] (Bed Mobility) verbal cues;dependent (less than 25% patient effort);2 person assist  -     Bed Mobility, Safety Issues cognitive deficits limit understanding;decreased use of arms for pushing/pulling;decreased use of legs for bridging/pushing;impaired trunk control for bed mobility  -     Assistive Device (Bed Mobility) draw sheet  -       Row Name 06/07/24 0944          Transfers    Transfers bed-chair transfer  -       Row Name 06/07/24 0944          Bed-Chair Transfer    Bed-Chair [place] (Transfers) dependent (less than 25% patient effort);2 person assist  -     Assistive Device (Bed-Chair Transfers) lift device  -       Row Name 06/07/24 0944          Functional Mobility    Functional Mobility- Comment non ambulatory at baseline  -       Row Name 06/07/24 0944          Activities of Daily Living    BADL Assessment/Intervention  feeding;grooming  -       Row Name 06/07/24 0944          Self-Feeding Assessment/Training    Ina Level (Feeding) liquids to mouth;minimum assist (75% patient effort)  -     Assistive Devices (Feeding) built-up handle utensils;adapted cup  -AC     Position (Self-Feeding) supported sitting  -     Comment, (Feeding) able to take built up  spoon to mouth - anticipate pt would require min/mod A self feeding,  min a cup to mouth  -       Row Name 06/07/24 0944          Grooming Assessment/Training    Ina Level (Grooming) wash face, hands;minimum assist (75% patient effort)  -     Position (Grooming) sitting up in bed  -               User Key  (r) = Recorded By, (t) = Taken By, (c) = Cosigned By      Initials Name Provider Type    Radha Hayes, OT Occupational Therapist                   Obj/Interventions       Row Name 06/07/24 0947          Sensory Assessment (Somatosensory)    Sensory Assessment (Somatosensory) unable/difficult to assess  -       Row Name 06/07/24 0947          Vision Assessment/Intervention    Visual Impairment/Limitations unable/difficult to assess  -[facility] Name 06/07/24 0947          Range of Motion Comprehensive    Comment, General Range of Motion B shld limited 50 %, hands limited 75 % with ulnar drift  -[facility] Name 06/07/24 0947          Strength Comprehensive (MMT)    Comment, General Manual Muscle Testing (MMT) Assessment B shld 2+/5, elbows 3/5, hands 2/5  -               User Key  (r) = Recorded By, (t) = Taken By, (c) = Cosigned By      Initials Name Provider Type    Radha Hayes, OT Occupational Therapist                   Goals/Plan       Row Name 06/07/24 0955          Grooming Goal 1 (OT)    Activity/Device (Grooming Goal 1, OT) wash face, hands  -     Ina (Grooming Goal 1, OT) set-up required;supervision required;verbal cues required  -     Time Frame (Grooming Goal 1, OT) short term goal (STG);5 days  -      Progress/Outcome (Grooming Goal 1, OT) new goal;goal ongoing  -       Row Name 06/07/24 0955          Self-Feeding Goal 1 (OT)    Activity/Device (Self-Feeding Goal 1, OT) liquids to mouth;scoop food and bring to mouth;adapted cup;built-up handle utensils  -     [place] Level/Cues Needed (Self-Feeding Goal 1, OT) set-up required;supervision required;verbal cues required  -     Time Frame (Self-Feeding Goal 1, OT) long term goal (LTG);10 days  -     Progress/Outcomes (Self-Feeding Goal 1, OT) new goal;goal ongoing  -       Row Name 06/07/24 0955          Therapy Assessment/Plan (OT)    Planned Therapy Interventions (OT) activity tolerance training;adaptive equipment training;occupation/activity based interventions;ROM/therapeutic exercise;strengthening exercise  -               User Key  (r) = Recorded By, (t) = Taken By, (c) = Cosigned By      Initials Name Provider Type    AC Radha Grijalva, OT Occupational Therapist                   Clinical Impression       Row Name 06/07/24 0949          Pain Assessment    Pretreatment Pain Rating 0/10 - no pain  -     Posttreatment Pain Rating 0/10 - no pain  -       Row Name 06/07/24 0949          Plan of Care Review    Plan of Care Reviewed With patient  -     Outcome Evaluation Pt presents with confusion, limited UE range,weakenss, and decr activity tolerance limiting independence with self care/mobility.  Pt min A to wash face, min a adapted cup to mouth,  dep x 2 with bed mobility and lift to chair.  OT will follow 3 times a week to advance pt toward increased ind with feeding/grooming tasks.  Recommend return to [facility] and continue with OT.  -       Row Name 06/07/24 0949          Therapy Assessment/Plan (OT)    Patient/Family Therapy Goal Statement (OT) pt did not state  -     Rehab Potential (OT) fair, will monitor progress closely  -     Criteria for Skilled Therapeutic Interventions Met (OT) yes;skilled treatment is necessary  -      Therapy Frequency (OT) 3 times/wk  -AC     Predicted Duration of Therapy Intervention (OT) 10 days  -AC       Row Name 06/07/24 0949          Therapy Plan Review/Discharge Plan (OT)    Anticipated Discharge Disposition (OT) other (see comments)  return to [facility] and continue OT services  -       Row Name 06/07/24 0949          Vital Signs    Pre Systolic BP Rehab 164  -AC     Pre Treatment Diastolic BP 85  -AC     Pretreatment Heart Rate (beats/min) 90  -AC     Posttreatment Heart Rate (beats/min) 94  -AC     O2 Delivery Pre Treatment room air  -AC     O2 Delivery Intra Treatment room air  -AC     O2 Delivery Post Treatment room air  -AC     Pre Patient Position Supine  -AC     Post Patient Position Sitting  -AC       Row Name 06/07/24 0949          Positioning and Restraints    Pre-Treatment Position in bed  -AC     Post Treatment Position chair  -AC     In Chair notified nsg;reclined;call light within reach;encouraged to call for assist;exit alarm on;waffle cushion;on mechanical lift sling;waffle boot/both  -AC               User Key  (r) = Recorded By, (t) = Taken By, (c) = Cosigned By      Initials Name Provider Type    AC Radha Grijalva, OT Occupational Therapist                   Outcome Measures       Row Name 06/07/24 0957          How much help from another is currently needed...    Putting on and taking off regular lower body clothing? 1  -AC     Bathing (including washing, rinsing, and drying) 1  -AC     Toileting (which includes using toilet bed pan or urinal) 1  -AC     Putting on and taking off regular upper body clothing 1  -AC     Taking care of personal grooming (such as brushing teeth) 2  -AC     Eating meals 2  -AC     AM-PAC 6 Clicks Score (OT) 8  -AC       Row Name 06/07/24 0958          How much help from another person do you currently need...    Turning from your back to your side while in flat bed without using bedrails? 1  -AE     Moving from lying on back to sitting on the side of a  flat bed without bedrails? 1  -AE     Moving to and from a bed to a chair (including a wheelchair)? 1  -AE     Standing up from a chair using your arms (e.g., wheelchair, bedside chair)? 1  -AE     Climbing 3-5 steps with a railing? 1  -AE     To walk in hospital room? 1  -AE     AM-PAC 6 Clicks Score (PT) 6  -AE     Highest Level of Mobility Goal 2 --> Bed activities/dependent transfer  -AE       Row Name 06/07/24 0958 06/07/24 0957       Functional Assessment    Outcome Measure Options AM-PAC 6 Clicks Basic Mobility (PT)  -AE AM-PAC 6 Clicks Daily Activity (OT)  -AC              User Key  (r) = Recorded By, (t) = Taken By, (c) = Cosigned By      Initials Name Provider Type    Radha Hayes, OT Occupational Therapist    Pete Light, PT Physical Therapist                    Occupational Therapy Education       Title: PT OT SLP Therapies (In Progress)       Topic: Occupational Therapy (In Progress)       Point: ADL training (In Progress)       Description:   Instruct learner(s) on proper safety adaptation and remediation techniques during self care or transfers.   Instruct in proper use of assistive devices.                  Learning Progress Summary             Patient Acceptance, E, NR by  at 6/7/2024 0958                         Point: Home exercise program (Not Started)       Description:   Instruct learner(s) on appropriate technique for monitoring, assisting and/or progressing therapeutic exercises/activities.                  Learner Progress:  Not documented in this visit.              Point: Precautions (Not Started)       Description:   Instruct learner(s) on prescribed precautions during self-care and functional transfers.                  Learner Progress:  Not documented in this visit.              Point: Body mechanics (Not Started)       Description:   Instruct learner(s) on proper positioning and spine alignment during self-care, functional mobility activities and/or exercises.                   Learner Progress:  Not documented in this visit.                              User Key       Initials Effective Dates Name Provider Type Discipline     02/03/23 -  Radha Grijalva, OT Occupational Therapist OT                  OT Recommendation and Plan  Planned Therapy Interventions (OT): activity tolerance training, adaptive equipment training, occupation/activity based interventions, ROM/therapeutic exercise, strengthening exercise  Therapy Frequency (OT): 3 times/wk  Plan of Care Review  Plan of Care Reviewed With: patient  Outcome Evaluation: Pt presents with confusion, limited UE range,weakenss, and decr activity tolerance limiting independence with self care/mobility.  Pt min A to wash face, min a adapted cup to mouth,  dep x 2 with bed mobility and lift to chair.  OT will follow 3 times a week to advance pt toward increased ind with feeding/grooming tasks.  Recommend return to [facility] and continue with OT.     Time Calculation:   Evaluation Complexity (OT)  Review Occupational Profile/Medical/Therapy History Complexity: expanded/moderate complexity  Assessment, Occupational Performance/Identification of Deficit Complexity: 3-5 performance deficits  Clinical Decision Making Complexity (OT): detailed assessment/moderate complexity  Overall Complexity of Evaluation (OT): moderate complexity     Time Calculation- OT       Row Name 06/07/24 0844             Time Calculation- OT    OT Start Time 0844  -AC      OT Received On 06/07/24  -AC      OT Goal Re-Cert Due Date 06/17/24  -AC         Timed Charges    06669 - OT Self Care/Mgmt Minutes 10  -AC         Untimed Charges    OT Eval/Re-eval Minutes 40  -AC         Total Minutes    Timed Charges Total Minutes 10  -AC      Untimed Charges Total Minutes 40  -AC       Total Minutes 50  -AC                User Key  (r) = Recorded By, (t) = Taken By, (c) = Cosigned By      Initials Name Provider Type    AC Radha Grijalva, OT Occupational Therapist                   Therapy Charges for Today       Code Description Service Date Service Provider Modifiers Qty    [id number]  OT SELF CARE/MGMT/TRAIN EA 15 MIN 6/7/2024 Radha Grijalva, OT GO 1    [id number]  OT EVAL MOD COMPLEXITY 3 6/7/2024 Radha Grijalva, OT GO 1                 Radha Grijalva OT  6/7/2024

## 2024-06-07 NOTE — PLAN OF CARE
Goal Outcome Evaluation:  Plan of Care Reviewed With: patient        Progress: no change  Outcome Evaluation: Pt presents with increased confusion, generalized weakness, and decreased activity tolerance. Unable to determine full PLOF, however, per chart review patient is a lift to w/c and does not stand or attempt to ambulate at baseline. Pt tolerated dep A lift to chair with mechanical lift. Pt appears to be at functional baseline, will sign off. Recommend D/C back to LTC facility when medically appropriate.      Anticipated Discharge Disposition (PT): extended care facility

## 2024-06-07 NOTE — DISCHARGE PLACEMENT REQUEST
"To: [facility] Admissions  From: Kaitlin Lr,   [phone]    Henry Walls (79 y.o. Female)       Date of Birth   1945    Social Security Number       Address   [address] [facility] [address]    Home Phone   [phone]    MRN   [id number]       Yazdanism   Muslim    Marital Status                               Admission Date   6/5/24    Admission Type   Emergency    Admitting Provider   Marilyn Floyd MD    Attending Provider   Marilyn Floyd MD    Department, Room/Bed   [address], S[address]/1       Discharge Date       Discharge Disposition   Short Term Hospital (DC - External)    Discharge Destination                                 Attending Provider: Marilyn Floyd MD    Allergies: No Known Allergies    Isolation: None   Infection: None   Code Status: No CPR    Ht: 172.7 cm (68\")   Wt: 71.1 kg (156 lb 11.2 oz)    Admission Cmt: None   Principal Problem: Symptomatic anemia [D64.9]                   Active Insurance as of 6/5/2024       Primary Coverage       Payor Plan Insurance Group Employer/Plan Group    MEDICARE MEDICARE A & B        Payor Plan Address Payor Plan Phone Number Payor Plan Fax Number Effective Dates    [address] [phone]  3/1/2012 - None Entered    [facility] [id number]         Subscriber Name Subscriber Birth Date Member ID       HENRY WALLS 1945 [id number]               Secondary Coverage       Payor Plan Insurance Group Employer/Plan Group    [place]        Payor Plan Address Payor Plan Phone Number Payor Plan Fax Number Effective Dates    [address] [phone]  4/3/2022 - None Entered    [place] [address]         Subscriber Name Subscriber Birth Date Member ID       HENRY WALLS 1945 [id number]                     Emergency Contacts        (Rel.) Home Phone Work Phone Mobile Phone    Hollie Wei (Daughter) [phone] -- --    Tiago Soni " "POA (Son) [phone] -- --                 Discharge Summary        Tova العراقي APRN at 24 1215              [facility] Medicine Services  DISCHARGE SUMMARY    Patient Name: Mónica Carrillo  : 1945  MRN: [id number]    Date of Admission: 2024 12:35 PM  Date of Discharge:  2024  Primary Care Physician: Molina Domínguez MD    Consults       No orders found from 2024 to 2024.            Hospital Course     Active Hospital Problems    Diagnosis  POA    **Symptomatic anemia [D64.9]  Yes    Essential hypertension [I10]  Yes    Hypothyroidism [E03.9]  Yes    Hyperlipidemia [E78.5]  Yes    Dementia [F03.90]  Yes      Resolved Hospital Problems   No resolved problems to display.      Hospital Course:  Mónica Carrillo is a 79 y.o. female with past history of severe dementia, currently a resident at [facility], history of MACK, hypertension, hypothyroidism who presents to the ED with generalized weakness and Hg of 6.4. Daughter reports not eating for several weeks.      Symptomatic iron deficiency anemia  -Concern for possible blood loss, Hemoccult is positive, patient however was history of MACK  -Hg was 6.4, then 7.2 here baseline from chart review her baseline seems to be~10  -Noted iron deficiency; started PO iron  -continue Protonix IV twice daily  -1 unit PRBC transfused on  with improvement  -Discussed goals of care with patient's daughter Hollie who discussed with other family members and has decided to proceed with \"leaving her alone\"; interested in hospice but family decided that they want to try OT then hospice.     Hypertension  -BP stable to mildly hypertensive  --Restart metoprolol     Hypothyroidism  -Resume Synthroid     Alzheimer's dementia, severe  Anorexia  -Continue Remeron  -Continue risperidone    Patient is medically ready to go back to facility and will be transported by ambulance.  Discharge follow-up recommendations and appointments are listed " "below and in the AVS.    Discharge Follow Up Recommendations for outpatient labs/diagnostics:  -- Follow-up with facility provider in 1 to 2 days  --Start occupational therapy at facility (insurance approved prior to hospitalization)  --Family spoke with hospice, but on hold until patient no longer gets therapy  --Start pantoprazole 40 mg 2 times a day  -- Use liquifilm 1.4% 2 x 5 every 1 hour as needed for dry eyes  --Stop taking aspirin  --Stop taking Norco and tramadol    Day of Discharge     HPI:   Patient sitting up in chair sleeping but easily arousable to verbal stimuli.  Patient is mildly confused and knows who she is and that she is in a \"hospital\" but can give no other information.  No complaints at this time.  Stated she wanted to go back to sleep.    Vital Signs:   Temp:  [98.1 °F (36.7 °C)-98.9 °F (37.2 °C)] 98.1 °F (36.7 °C)  Heart Rate:  [] 97  Resp:  [16-18] 16  BP: (142-164)/(70-85) 142/79    Physical Exam:  Constitutional: Sleeping but easily arousable frail chronically ill-appearing elderly female sitting up in chair in NAD  ENT: Pink, mildly dry mucous membranes   Respiratory: Nonlabored, symmetrical chest expansion, CTAB, RA  Cardiovascular: RRR, no M/R/G  Gastrointestinal: Soft, NT, ND +BS  Musculoskeletal: CHAVEZ; no LE edema bilaterally  Neurologic: Oriented to self and \"hospital\", follows all commands, speech clear  Skin: No rashes on exposed skin  Psychiatric: Pleasant and cooperative; normal affect      Pertinent  and/or Most Recent Results     LAB RESULTS:      Lab 06/05/24 2039 06/05/24  1301   WBC  --  9.41   HEMOGLOBIN 7.9* 7.2*   HEMATOCRIT 28.5* 26.5*   PLATELETS  --  442   NEUTROS ABS  --  5.57   IMMATURE GRANS (ABS)  --  0.03   LYMPHS ABS  --  2.25   MONOS ABS  --  0.57   EOS ABS  --  0.93*   MCV  --  76.8*         Lab 06/05/24  1301   SODIUM 139   POTASSIUM 4.1   CHLORIDE 103   CO2 30.0*   ANION GAP 6.0   BUN 8   CREATININE 0.72   EGFR 84.1   GLUCOSE 116*   CALCIUM 9.1 "   MAGNESIUM 1.9   TSH 1.440         Lab 06/05/24  1301   TOTAL PROTEIN 8.1   ALBUMIN 2.8*   GLOBULIN 5.3   ALT (SGPT) <5   AST (SGOT) 10   BILIRUBIN 0.2   ALK PHOS 97         Lab 06/05/24 2039 06/05/24  1301   HSTROP T 39* 55*             Lab 06/05/24  1357 06/05/24  1309 06/05/24  1309 06/05/24  1301   IRON  --   --   --  16*   IRON SATURATION (TSAT)  --   --   --  6*   TIBC  --   --   --  259*   TRANSFERRIN  --   --   --  174*   FERRITIN  --   --   --  47.40   ABO TYPING O   < > O  --    RH TYPING Positive   < > Positive  --    ANTIBODY SCREEN  --   --  Negative  --     < > = values in this interval not displayed.         Brief Urine Lab Results  (Last result in the past 365 days)        Color   Clarity   Blood   Leuk Est   Nitrite   Protein   CREAT   Urine HCG        06/05/24 1502 Yellow   Cloudy   Trace   Large (3+)   Negative   Negative                 Microbiology Results (last 10 days)       ** No results found for the last 240 hours. **            XR Chest 1 View    Result Date: 6/5/2024  XR CHEST 1 VW Date of Exam: 6/5/2024 12:48 PM EDT Indication: Weak/Dizzy/AMS triage protocol Comparison: 7/17/2022 Findings: The cardiomediastinal silhouette is within normal limits. Chronic left basilar scarring. No new consolidation, pneumothorax, or significant pleural effusion. Osseous structures grossly intact.     Impression: No acute process. Electronically Signed: John Hope MD  6/5/2024 1:24 PM EDT  Workstation ID: [id number]                 Plan for Follow-up of Pending Labs/Results:     Discharge Details        Discharge Medications        New Medications        Instructions Start Date   pantoprazole 40 MG EC tablet  Commonly known as: PROTONIX   40 mg, Oral, 2 Times Daily Before Meals      polyvinyl alcohol 1.4 % ophthalmic solution  Commonly known as: LIQUIFILM   2 drops, Both Eyes, Every 1 Hour PRN             Continue These Medications        Instructions Start Date   acetaminophen 500 MG tablet  Commonly  known as: TYLENOL   500 mg, Oral, Every 6 Hours PRN      atorvastatin 40 MG tablet  Commonly known as: LIPITOR   40 mg, Oral, Every Morning      docusate sodium 100 MG capsule  Commonly known as: COLACE   100 mg, Oral, 2 Times Daily      Ergocalciferol 50 MCG (2000 UT) capsule   1 capsule, Oral, Every Morning      ferrous sulfate 325 (65 FE) MG tablet  Commonly known as: FerrouSul   325 mg, Oral, Every Other Day      folic acid 1 MG tablet  Commonly known as: FOLVITE   1 mg, Oral, Daily      levothyroxine 50 MCG tablet  Commonly known as: SYNTHROID, LEVOTHROID   50 mcg, Oral, Daily      magnesium hydroxide 400 MG/5ML suspension  Commonly known as: MILK OF MAGNESIA   30 mL, Oral, Daily PRN      metoprolol tartrate 50 MG tablet  Commonly known as: LOPRESSOR   50 mg, Oral, 2 Times Daily      mirtazapine 7.5 MG tablet  Commonly known as: REMERON   7.5 mg, Oral, Nightly      MULTIVITAMIN & MINERAL PO   1 tablet, Oral, Every Morning      risperiDONE 0.25 MG tablet  Commonly known as: risperDAL   0.25 mg, Oral, 2 Times Daily             Stop These Medications      aspirin 81 MG chewable tablet     HYDROcodone-acetaminophen 5-325 MG per tablet  Commonly known as: NORCO     traMADol 50 MG tablet  Commonly known as: ULTRAM              No Known Allergies      Discharge Disposition:  Short Term Hospital (DC - External)    Diet:  Hospital:  Diet Order   Procedures    Diet: Regular/House; Texture: Soft to Chew (NDD 3); Soft to Chew: Chopped Meat; Fluid Consistency: Thin (IDDSI 0)       Diet Instructions       Diet: Diabetic Diets; Consistent Carbohydrate; Soft to Chew (NDD 3); Chopped Meat; Thin (IDDSI 0)      Discharge Diet: Diabetic Diets    Diabetic Diet: Consistent Carbohydrate    Texture: Soft to Chew (NDD 3)    Soft to Chew: Chopped Meat    Fluid Consistency: Thin (IDDSI 0)             Activity:  Activity Instructions       Activity as Tolerated              Restrictions or Other Recommendations:  None       CODE STATUS:     Code Status and Medical Interventions:   Ordered at: 06/05/24 1621     Medical Intervention Limits:    No intubation (DNI)     Code Status (Patient has no pulse and is not breathing):    No CPR (Do Not Attempt to Resuscitate)     Medical Interventions (Patient has pulse or is breathing):    Limited Support       Additional Instructions for the Follow-ups that You Need to Schedule       Discharge Follow-up with Specified Provider: Follow-up with facility provider in 1 to 2 days   As directed      To: Follow-up with facility provider in 1 to 2 days                      PAOLO Montanez  06/07/24      Time Spent on Discharge:  I spent  35  minutes on this discharge activity which included: face-to-face encounter with the patient, reviewing the data in the system, coordination of the care with the nursing staff as well as consultants, documentation, and entering orders.            Electronically signed by Tova العراقي APRN at 06/07/24 8366

## 2024-06-07 NOTE — THERAPY DISCHARGE NOTE
"Patient Name: Mónica Carrillo  : 1945    MRN: [id number]                              Today's Date: 2024       Admit Date: 2024    Visit Dx:     ICD-10-CM ICD-9-CM   1. Acute GI bleeding  K92.2 578.9   2. Acute blood loss anemia  D62 285.1   3. Generalized weakness  R53.1 780.79     Patient Active Problem List   Diagnosis    Positive blood culture    Acute UTI (urinary tract infection)    Symptomatic anemia    Hypocalcemia    Wound of left foot    Essential hypertension    Hypothyroidism    Hyperlipidemia    Dementia    CAP (community acquired pneumonia)    Altered mental status     Past Medical History:   Diagnosis Date    Dementia     Hypertension     Mood disorder     Thyroid disease      History reviewed. No pertinent surgical history.   General Information       Row Name 24 0943          Physical Therapy Time and Intention    Document Type discharge evaluation/summary  -AE     Mode of Treatment physical therapy  -AE       Row Name 24 0943          General Information    Patient Profile Reviewed yes  -AE     Prior Level of Function min assist:;feeding;mod assist:  -AE     Existing Precautions/Restrictions fall;other (see comments)  dementia; contractures  -AE     Barriers to Rehab medically complex;previous functional deficit;cognitive status;physical barrier;contractures  -AE       Row Name 24 0943          Living Environment    People in Home facility resident  -AE       Row Name 24 0943          Home Main Entrance    Number of Stairs, Main Entrance none  -AE     Stair Railings, Main Entrance none  -AE       Row Name 24 0943          Stairs Within Home, Primary    Number of Stairs, Within Home, Primary none  -AE     Stair Railings, Within Home, Primary none  -AE       Row Name 24 0943          Cognition    Orientation Status (Cognition) oriented to;person;other (see comments);disoriented to;time  given choices stated \"hospital\"  -AE       Row Name 24 " 0943          Safety Issues, Functional Mobility    Safety Issues Affecting Function (Mobility) awareness of need for assistance;insight into deficits/self-awareness;safety precaution awareness;safety precautions follow-through/compliance;sequencing abilities  -AE     Impairments Affecting Function (Mobility) balance;cognition;coordination;grasp;muscle tone abnormal;range of motion (ROM);strength  -AE     Cognitive Impairments, Mobility Safety/Performance awareness, need for assistance;insight into deficits/self-awareness;safety precaution awareness;safety precaution follow-through;sequencing abilities  -AE               User Key  (r) = Recorded By, (t) = Taken By, (c) = Cosigned By      Initials Name Provider Type    AE Pete Crow, PT Physical Therapist                   Mobility       Row Name 06/07/24 0947          Bed Mobility    Bed Mobility rolling left;rolling right  -AE     Rolling Left Hood (Bed Mobility) dependent (less than 25% patient effort);2 person assist  -AE     Rolling Right Hood (Bed Mobility) dependent (less than 25% patient effort);2 person assist  -AE     Assistive Device (Bed Mobility) draw sheet  -AE     Comment, (Bed Mobility) VCs for hand placement and sequencing. Pt unable to use BUE to hold onto bed rails. Did not assist with rolling for placement of lift sling.  -AE       Row Name 06/07/24 0947          Transfers    Comment, (Transfers) Pt tolerated dep A lift from bed to chair with mechanical lift.  -AE       Row Name 06/07/24 0947          Bed-Chair Transfer    Bed-Chair Hood (Transfers) dependent (less than 25% patient effort);2 person assist  -AE     Assistive Device (Bed-Chair Transfers) lift device  -AE       Row Name 06/07/24 0947          Sit-Stand Transfer    Sit-Stand Hood (Transfers) not tested  -AE     Comment, (Sit-Stand Transfer) BLE ankle PF contractures preventing assessment of STS  -AE       Row Name 06/07/24 0979           Gait/Stairs (Locomotion)    Comment, (Gait/Stairs) Non ambulatory at basement  -AE               User Key  (r) = Recorded By, (t) = Taken By, (c) = Cosigned By      Initials Name Provider Type    AE Pete Crow PT Physical Therapist                   Obj/Interventions       Row Name 06/07/24 0950          Range of Motion Comprehensive    General Range of Motion lower extremity range of motion deficits identified  -AE     Comment, General Range of Motion B ankles limited 75% with PF contractures  -AE       Row Name 06/07/24 0950          Strength Comprehensive (MMT)    Comment, General Manual Muscle Testing (MMT) Assessment BLE 3-/5  -AE       Row Name 06/07/24 0950          Balance    Balance Assessment sitting static balance;sitting dynamic balance  -AE     Static Sitting Balance contact guard  -AE     Dynamic Sitting Balance contact guard  -AE     Position, Sitting Balance sitting in chair  -AE       Row Name 06/07/24 0950          Sensory Assessment (Somatosensory)    Sensory Assessment (Somatosensory) bilateral LE  -AE     Bilateral LE Sensory Assessment light touch localization;absent;unable/difficult to assess  -AE               User Key  (r) = Recorded By, (t) = Taken By, (c) = Cosigned By      Initials Name Provider Type    AE Pete Crow PT Physical Therapist                   Goals/Plan    No documentation.                  Clinical Impression       Row Name 06/07/24 0954          Pain    Pretreatment Pain Rating 0/10 - no pain  -AE     Posttreatment Pain Rating 0/10 - no pain  -AE       Row Name 06/07/24 0954          Plan of Care Review    Plan of Care Reviewed With patient  -AE     Progress no change  -AE     Outcome Evaluation Pt presents with increased confusion, generalized weakness, and decreased activity tolerance. Unable to determine full PLOF, however, per chart review patient is a lift to w/c and does not stand or attempt to ambulate at baseline. Pt tolerated dep A lift to chair with  mechanical lift. Pt appears to be at functional baseline, will sign off. Recommend D/C back to LTC facility when medically appropriate.  -AE       Row Name 06/07/24 0954          Therapy Assessment/Plan (PT)    Patient/Family Therapy Goals Statement (PT) Unable to state  -AE     Criteria for Skilled Interventions Met (PT) no;other (see comments)  baseline  -AE     Therapy Frequency (PT) evaluation only  -AE       Row Name 06/07/24 0954          Vital Signs    Pre Systolic BP Rehab 164  -AE     Pre Treatment Diastolic BP 85  -AE     Pretreatment Heart Rate (beats/min) 90  -AE     Posttreatment Heart Rate (beats/min) 91  -AE     O2 Delivery Pre Treatment room air  -AE     O2 Delivery Intra Treatment room air  -AE     O2 Delivery Post Treatment room air  -AE     Pre Patient Position Supine  -AE     Intra Patient Position Side Lying  -AE     Post Patient Position Sitting  -AE       Row Name 06/07/24 0954          Positioning and Restraints    Pre-Treatment Position in bed  -AE     Post Treatment Position chair  -AE     In Chair notified nsg;reclined;call light within reach;encouraged to call for assist;exit alarm on;with OT  -AE               User Key  (r) = Recorded By, (t) = Taken By, (c) = Cosigned By      Initials Name Provider Type    AE Pete Crow, PT Physical Therapist                   Outcome Measures       Row Name 06/07/24 0993          How much help from another person do you currently need...    Turning from your back to your side while in flat bed without using bedrails? 1  -AE     Moving from lying on back to sitting on the side of a flat bed without bedrails? 1  -AE     Moving to and from a bed to a chair (including a wheelchair)? 1  -AE     Standing up from a chair using your arms (e.g., wheelchair, bedside chair)? 1  -AE     Climbing 3-5 steps with a railing? 1  -AE     To walk in hospital room? 1  -AE     AM-PAC 6 Clicks Score (PT) 6  -AE     Highest Level of Mobility Goal 2 --> Bed  activities/dependent transfer  -AE       Row Name 06/07/24 0958 06/07/24 0957       Functional Assessment    Outcome Measure Options AM-PAC 6 Clicks Basic Mobility (PT)  -AE AM-PAC 6 Clicks Daily Activity (OT)  -AC              User Key  (r) = Recorded By, (t) = Taken By, (c) = Cosigned By      Initials Name Provider Type    AC Radha Grijalva, OT Occupational Therapist    AE Pete Crow PT Physical Therapist                  Physical Therapy Education       Title: PT OT SLP Therapies (In Progress)       Topic: Physical Therapy (In Progress)       Point: Mobility training (In Progress)       Learning Progress Summary             Patient Acceptance, E, NR by AE at 6/7/2024 0832                         Point: Home exercise program (Not Started)       Learner Progress:  Not documented in this visit.              Point: Body mechanics (In Progress)       Learning Progress Summary             Patient Acceptance, E, NR by AE at 6/7/2024 0832                         Point: Precautions (In Progress)       Learning Progress Summary             Patient Acceptance, E, NR by AE at 6/7/2024 0832                                         User Key       Initials Effective Dates Name Provider Type Discipline    AE 09/21/21 -  Pete Crow, JACY Physical Therapist PT                  PT Recommendation and Plan     Plan of Care Reviewed With: patient  Progress: no change  Outcome Evaluation: Pt presents with increased confusion, generalized weakness, and decreased activity tolerance. Unable to determine full PLOF, however, per chart review patient is a lift to w/c and does not stand or attempt to ambulate at baseline. Pt tolerated dep A lift to chair with mechanical lift. Pt appears to be at functional baseline, will sign off. Recommend D/C back to LTC facility when medically appropriate.     Time Calculation:   PT Evaluation Complexity  History, PT Evaluation Complexity: 3 or more personal factors and/or  comorbidities  Examination of Body Systems (PT Eval Complexity): total of 3 or more elements  Clinical Presentation (PT Evaluation Complexity): evolving  Clinical Decision Making (PT Evaluation Complexity): moderate complexity  Overall Complexity (PT Evaluation Complexity): moderate complexity     PT Charges       Row Name 06/07/24 0959             Time Calculation    Start Time 0832  -AE      PT Received On 06/07/24  -AE         Timed Charges    80061 - PT Therapeutic Activity Minutes 9  -AE         Untimed Charges    PT Eval/Re-eval Minutes 49  -AE         Total Minutes    Timed Charges Total Minutes 9  -AE      Untimed Charges Total Minutes 49  -AE       Total Minutes 58  -AE                User Key  (r) = Recorded By, (t) = Taken By, (c) = Cosigned By      Initials Name Provider Type    AE Pete Crow, PT Physical Therapist                  Therapy Charges for Today       Code Description Service Date Service Provider Modifiers Qty    56846261613 HC PT THERAPEUTIC ACT EA 15 MIN 6/7/2024 Pete Crow, PT GP 1    33739610047 HC PT EVAL MOD COMPLEXITY 4 6/7/2024 Pete Crow, PT GP 1            PT G-Codes  Outcome Measure Options: AM-PAC 6 Clicks Basic Mobility (PT)  AM-PAC 6 Clicks Score (PT): 6  AM-PAC 6 Clicks Score (OT): 8    PT Discharge Summary  Anticipated Discharge Disposition (PT): extended care facility    Pete Crow PT  6/7/2024

## 2024-06-10 LAB
QT INTERVAL: 406 MS
QTC INTERVAL: 441 MS

## 2024-10-17 ENCOUNTER — APPOINTMENT (OUTPATIENT)
Dept: CT IMAGING | Facility: HOSPITAL | Age: 79
End: 2024-10-17
Payer: MEDICARE

## 2024-10-17 ENCOUNTER — HOSPITAL ENCOUNTER (INPATIENT)
Facility: HOSPITAL | Age: 79
LOS: 1 days | End: 2024-10-19
Attending: EMERGENCY MEDICINE | Admitting: HOSPITALIST
Payer: MEDICARE

## 2024-10-17 ENCOUNTER — APPOINTMENT (OUTPATIENT)
Dept: GENERAL RADIOLOGY | Facility: HOSPITAL | Age: 79
End: 2024-10-17
Payer: MEDICARE

## 2024-10-17 DIAGNOSIS — G40.901 STATUS EPILEPTICUS: Primary | ICD-10-CM

## 2024-10-17 DIAGNOSIS — E87.20 LACTIC ACIDEMIA: ICD-10-CM

## 2024-10-17 DIAGNOSIS — R40.2431 GLASGOW COMA SCALE TOTAL SCORE 3-8, IN THE FIELD (EMT OR AMBULANCE): ICD-10-CM

## 2024-10-17 LAB
ALBUMIN SERPL-MCNC: 2 G/DL (ref 3.5–5.2)
ALBUMIN/GLOB SERPL: 0.4 G/DL
ALP SERPL-CCNC: 98 U/L (ref 39–117)
ALT SERPL W P-5'-P-CCNC: 6 U/L (ref 1–33)
ANION GAP SERPL CALCULATED.3IONS-SCNC: 13 MMOL/L (ref 5–15)
APAP SERPL-MCNC: <5 MCG/ML (ref 0–30)
APTT PPP: 29.2 SECONDS (ref 22–39)
AST SERPL-CCNC: 17 U/L (ref 1–32)
BASOPHILS # BLD AUTO: 0.04 10*3/MM3 (ref 0–0.2)
BASOPHILS NFR BLD AUTO: 0.4 % (ref 0–1.5)
BILIRUB SERPL-MCNC: 0.3 MG/DL (ref 0–1.2)
BUN BLDA-MCNC: 12 MG/DL (ref 8–26)
BUN SERPL-MCNC: 15 MG/DL (ref 8–23)
BUN/CREAT SERPL: 15 (ref 7–25)
CA-I BLDA-SCNC: 1.17 MMOL/L (ref 1.2–1.32)
CALCIUM SPEC-SCNC: 8.5 MG/DL (ref 8.6–10.5)
CHLORIDE BLDA-SCNC: 100 MMOL/L (ref 98–109)
CHLORIDE SERPL-SCNC: 101 MMOL/L (ref 98–107)
CO2 BLDA-SCNC: 21 MMOL/L (ref 24–29)
CO2 SERPL-SCNC: 24 MMOL/L (ref 22–29)
CREAT BLDA-MCNC: 1 MG/DL (ref 0.6–1.3)
CREAT SERPL-MCNC: 1 MG/DL (ref 0.57–1)
D-LACTATE SERPL-SCNC: 6.9 MMOL/L (ref 0.5–2)
DEPRECATED RDW RBC AUTO: 67.2 FL (ref 37–54)
EGFRCR SERPLBLD CKD-EPI 2021: 56.7 ML/MIN/1.73
EGFRCR SERPLBLD CKD-EPI 2021: 56.7 ML/MIN/1.73
EOSINOPHIL # BLD AUTO: 0.07 10*3/MM3 (ref 0–0.4)
EOSINOPHIL NFR BLD AUTO: 0.7 % (ref 0.3–6.2)
ERYTHROCYTE [DISTWIDTH] IN BLOOD BY AUTOMATED COUNT: 23.3 % (ref 12.3–15.4)
ETHANOL BLD-MCNC: <10 MG/DL (ref 0–10)
GLOBULIN UR ELPH-MCNC: 4.6 GM/DL
GLUCOSE BLDC GLUCOMTR-MCNC: 101 MG/DL (ref 70–130)
GLUCOSE BLDC GLUCOMTR-MCNC: 111 MG/DL (ref 70–130)
GLUCOSE SERPL-MCNC: 120 MG/DL (ref 65–99)
HCT VFR BLD AUTO: 23.6 % (ref 34–46.6)
HCT VFR BLDA CALC: 21 % (ref 38–51)
HGB BLD-MCNC: 7 G/DL (ref 12–15.9)
HGB BLDA-MCNC: 7.1 G/DL (ref 12–17)
HOLD SPECIMEN: NORMAL
IMM GRANULOCYTES # BLD AUTO: 0.06 10*3/MM3 (ref 0–0.05)
IMM GRANULOCYTES NFR BLD AUTO: 0.6 % (ref 0–0.5)
LYMPHOCYTES # BLD AUTO: 0.84 10*3/MM3 (ref 0.7–3.1)
LYMPHOCYTES NFR BLD AUTO: 8.3 % (ref 19.6–45.3)
MAGNESIUM SERPL-MCNC: 1.8 MG/DL (ref 1.6–2.4)
MCH RBC QN AUTO: 23.9 PG (ref 26.6–33)
MCHC RBC AUTO-ENTMCNC: 29.7 G/DL (ref 31.5–35.7)
MCV RBC AUTO: 80.5 FL (ref 79–97)
MONOCYTES # BLD AUTO: 0.74 10*3/MM3 (ref 0.1–0.9)
MONOCYTES NFR BLD AUTO: 7.3 % (ref 5–12)
NEUTROPHILS NFR BLD AUTO: 8.41 10*3/MM3 (ref 1.7–7)
NEUTROPHILS NFR BLD AUTO: 82.7 % (ref 42.7–76)
NRBC BLD AUTO-RTO: 0 /100 WBC (ref 0–0.2)
PLATELET # BLD AUTO: 251 10*3/MM3 (ref 140–450)
PMV BLD AUTO: 8.7 FL (ref 6–12)
POTASSIUM BLDA-SCNC: 3.9 MMOL/L (ref 3.5–4.9)
POTASSIUM SERPL-SCNC: 5.2 MMOL/L (ref 3.5–5.2)
PROT SERPL-MCNC: 6.6 G/DL (ref 6–8.5)
RBC # BLD AUTO: 2.93 10*6/MM3 (ref 3.77–5.28)
SALICYLATES SERPL-MCNC: <0.3 MG/DL
SODIUM BLD-SCNC: 137 MMOL/L (ref 138–146)
SODIUM SERPL-SCNC: 138 MMOL/L (ref 136–145)
TROPONIN T SERPL HS-MCNC: 88 NG/L
WBC NRBC COR # BLD AUTO: 10.16 10*3/MM3 (ref 3.4–10.8)
WHOLE BLOOD HOLD COAG: NORMAL
WHOLE BLOOD HOLD SPECIMEN: NORMAL

## 2024-10-17 PROCEDURE — 99223 1ST HOSP IP/OBS HIGH 75: CPT

## 2024-10-17 PROCEDURE — 85025 COMPLETE CBC W/AUTO DIFF WBC: CPT | Performed by: EMERGENCY MEDICINE

## 2024-10-17 PROCEDURE — 83735 ASSAY OF MAGNESIUM: CPT | Performed by: EMERGENCY MEDICINE

## 2024-10-17 PROCEDURE — 82077 ASSAY SPEC XCP UR&BREATH IA: CPT | Performed by: EMERGENCY MEDICINE

## 2024-10-17 PROCEDURE — G0378 HOSPITAL OBSERVATION PER HR: HCPCS

## 2024-10-17 PROCEDURE — 85014 HEMATOCRIT: CPT

## 2024-10-17 PROCEDURE — 25810000003 LACTATED RINGERS SOLUTION: Performed by: EMERGENCY MEDICINE

## 2024-10-17 PROCEDURE — 25010000002 LEVETRIRACETAM PER 10 MG: Performed by: EMERGENCY MEDICINE

## 2024-10-17 PROCEDURE — 70498 CT ANGIOGRAPHY NECK: CPT

## 2024-10-17 PROCEDURE — 83605 ASSAY OF LACTIC ACID: CPT | Performed by: EMERGENCY MEDICINE

## 2024-10-17 PROCEDURE — 25010000002 MIDAZOLAM PER 1 MG

## 2024-10-17 PROCEDURE — 25510000001 IOPAMIDOL PER 1 ML: Performed by: EMERGENCY MEDICINE

## 2024-10-17 PROCEDURE — 25010000002 LACOSAMIDE 200 MG/20ML SOLUTION: Performed by: EMERGENCY MEDICINE

## 2024-10-17 PROCEDURE — 93005 ELECTROCARDIOGRAM TRACING: CPT | Performed by: EMERGENCY MEDICINE

## 2024-10-17 PROCEDURE — 80053 COMPREHEN METABOLIC PANEL: CPT | Performed by: EMERGENCY MEDICINE

## 2024-10-17 PROCEDURE — 70450 CT HEAD/BRAIN W/O DYE: CPT

## 2024-10-17 PROCEDURE — 70496 CT ANGIOGRAPHY HEAD: CPT

## 2024-10-17 PROCEDURE — 0042T HC CT CEREBRAL PERFUSION W/WO CONTRAST: CPT

## 2024-10-17 PROCEDURE — 84484 ASSAY OF TROPONIN QUANT: CPT | Performed by: EMERGENCY MEDICINE

## 2024-10-17 PROCEDURE — 80047 BASIC METABLC PNL IONIZED CA: CPT

## 2024-10-17 PROCEDURE — 25010000002 MIDAZOLAM PER 1 MG: Performed by: EMERGENCY MEDICINE

## 2024-10-17 PROCEDURE — C9254 INJECTION, LACOSAMIDE: HCPCS | Performed by: EMERGENCY MEDICINE

## 2024-10-17 PROCEDURE — 80179 DRUG ASSAY SALICYLATE: CPT | Performed by: EMERGENCY MEDICINE

## 2024-10-17 PROCEDURE — 80143 DRUG ASSAY ACETAMINOPHEN: CPT | Performed by: EMERGENCY MEDICINE

## 2024-10-17 PROCEDURE — 85730 THROMBOPLASTIN TIME PARTIAL: CPT | Performed by: EMERGENCY MEDICINE

## 2024-10-17 PROCEDURE — 99291 CRITICAL CARE FIRST HOUR: CPT

## 2024-10-17 PROCEDURE — 82948 REAGENT STRIP/BLOOD GLUCOSE: CPT

## 2024-10-17 RX ORDER — LEVETIRACETAM 500 MG/5ML
1500 INJECTION, SOLUTION, CONCENTRATE INTRAVENOUS ONCE
Status: COMPLETED | OUTPATIENT
Start: 2024-10-17 | End: 2024-10-17

## 2024-10-17 RX ORDER — MIDAZOLAM HYDROCHLORIDE 5 MG/ML
2 INJECTION INTRAMUSCULAR; INTRAVENOUS ONCE
Status: COMPLETED | OUTPATIENT
Start: 2024-10-17 | End: 2024-10-17

## 2024-10-17 RX ORDER — MIDAZOLAM HYDROCHLORIDE 1 MG/ML
2 INJECTION INTRAMUSCULAR; INTRAVENOUS ONCE
Status: COMPLETED | OUTPATIENT
Start: 2024-10-17 | End: 2024-10-17

## 2024-10-17 RX ORDER — SODIUM CHLORIDE 0.9 % (FLUSH) 0.9 %
10 SYRINGE (ML) INJECTION AS NEEDED
Status: DISCONTINUED | OUTPATIENT
Start: 2024-10-17 | End: 2024-10-19 | Stop reason: HOSPADM

## 2024-10-17 RX ORDER — MIDAZOLAM HYDROCHLORIDE 5 MG/ML
INJECTION INTRAMUSCULAR; INTRAVENOUS
Status: COMPLETED
Start: 2024-10-17 | End: 2024-10-17

## 2024-10-17 RX ORDER — LACOSAMIDE 10 MG/ML
200 INJECTION, SOLUTION INTRAVENOUS ONCE
Status: COMPLETED | OUTPATIENT
Start: 2024-10-17 | End: 2024-10-17

## 2024-10-17 RX ORDER — IOPAMIDOL 755 MG/ML
120 INJECTION, SOLUTION INTRAVASCULAR
Status: COMPLETED | OUTPATIENT
Start: 2024-10-17 | End: 2024-10-17

## 2024-10-17 RX ADMIN — MIDAZOLAM HYDROCHLORIDE 2 MG: 5 INJECTION, SOLUTION INTRAMUSCULAR; INTRAVENOUS at 21:30

## 2024-10-17 RX ADMIN — LACOSAMIDE 200 MG: 10 INJECTION INTRAVENOUS at 22:01

## 2024-10-17 RX ADMIN — MIDAZOLAM HYDROCHLORIDE 2 MG: 5 INJECTION INTRAMUSCULAR; INTRAVENOUS at 21:30

## 2024-10-17 RX ADMIN — MIDAZOLAM HYDROCHLORIDE 2 MG: 1 INJECTION, SOLUTION INTRAMUSCULAR; INTRAVENOUS at 21:51

## 2024-10-17 RX ADMIN — IOPAMIDOL 120 ML: 755 INJECTION, SOLUTION INTRAVENOUS at 21:57

## 2024-10-17 RX ADMIN — LEVETIRACETAM 1500 MG: 100 INJECTION, SOLUTION INTRAVENOUS at 21:19

## 2024-10-17 RX ADMIN — SODIUM CHLORIDE, POTASSIUM CHLORIDE, SODIUM LACTATE AND CALCIUM CHLORIDE 1000 ML: 600; 310; 30; 20 INJECTION, SOLUTION INTRAVENOUS at 21:24

## 2024-10-17 NOTE — LETTER
[facility]  [address] [facility] [address]  [phone]        October 18, 2024      Patient: Mónica Carrillo  YOB: 1945  Date of Visit: 10/17/2024      For inpatient hospice admission  Referring Seven Ferrell  Attending Savanah Soni  Certifying Dr Ratna Gomez

## 2024-10-18 PROBLEM — J96.01 ACUTE RESPIRATORY FAILURE WITH HYPOXIA: Status: ACTIVE | Noted: 2024-01-01

## 2024-10-18 PROBLEM — F51.3 SOMNAMBULANCE: Status: ACTIVE | Noted: 2024-01-01

## 2024-10-18 LAB
GLUCOSE BLDC GLUCOMTR-MCNC: 103 MG/DL (ref 70–130)
QT INTERVAL: 322 MS
QTC INTERVAL: 457 MS

## 2024-10-18 PROCEDURE — 99232 SBSQ HOSP IP/OBS MODERATE 35: CPT | Performed by: STUDENT IN AN ORGANIZED HEALTH CARE EDUCATION/TRAINING PROGRAM

## 2024-10-18 PROCEDURE — 25010000002 MORPHINE PER 10 MG: Performed by: STUDENT IN AN ORGANIZED HEALTH CARE EDUCATION/TRAINING PROGRAM

## 2024-10-18 PROCEDURE — 25010000002 GLYCOPYRROLATE 0.2 MG/ML SOLUTION: Performed by: STUDENT IN AN ORGANIZED HEALTH CARE EDUCATION/TRAINING PROGRAM

## 2024-10-18 PROCEDURE — 82948 REAGENT STRIP/BLOOD GLUCOSE: CPT

## 2024-10-18 PROCEDURE — 25010000002 LEVETRIRACETAM PER 10 MG

## 2024-10-18 RX ORDER — GLYCOPYRROLATE 0.2 MG/ML
0.4 INJECTION INTRAMUSCULAR; INTRAVENOUS EVERY 6 HOURS SCHEDULED
Status: DISCONTINUED | OUTPATIENT
Start: 2024-10-18 | End: 2024-10-19 | Stop reason: HOSPADM

## 2024-10-18 RX ORDER — GLYCOPYRROLATE 0.2 MG/ML
0.4 INJECTION INTRAMUSCULAR; INTRAVENOUS EVERY 6 HOURS PRN
Status: DISCONTINUED | OUTPATIENT
Start: 2024-10-18 | End: 2024-10-18

## 2024-10-18 RX ORDER — SODIUM CHLORIDE 0.9 % (FLUSH) 0.9 %
10 SYRINGE (ML) INJECTION EVERY 12 HOURS SCHEDULED
Status: DISCONTINUED | OUTPATIENT
Start: 2024-10-18 | End: 2024-10-19 | Stop reason: HOSPADM

## 2024-10-18 RX ORDER — SODIUM CHLORIDE 0.9 % (FLUSH) 0.9 %
10 SYRINGE (ML) INJECTION AS NEEDED
Status: DISCONTINUED | OUTPATIENT
Start: 2024-10-18 | End: 2024-10-19 | Stop reason: HOSPADM

## 2024-10-18 RX ORDER — ACETAMINOPHEN 650 MG/1
650 SUPPOSITORY RECTAL EVERY 4 HOURS PRN
Status: DISCONTINUED | OUTPATIENT
Start: 2024-10-18 | End: 2024-10-19 | Stop reason: HOSPADM

## 2024-10-18 RX ORDER — MORPHINE SULFATE 20 MG/ML
5 SOLUTION ORAL
Status: DISCONTINUED | OUTPATIENT
Start: 2024-10-18 | End: 2024-10-19 | Stop reason: HOSPADM

## 2024-10-18 RX ORDER — BISACODYL 5 MG/1
5 TABLET, DELAYED RELEASE ORAL DAILY PRN
Status: DISCONTINUED | OUTPATIENT
Start: 2024-10-18 | End: 2024-10-18

## 2024-10-18 RX ORDER — BISACODYL 10 MG
10 SUPPOSITORY, RECTAL RECTAL DAILY PRN
Status: DISCONTINUED | OUTPATIENT
Start: 2024-10-18 | End: 2024-10-18

## 2024-10-18 RX ORDER — MORPHINE SULFATE 2 MG/ML
2 INJECTION, SOLUTION INTRAMUSCULAR; INTRAVENOUS
Status: DISCONTINUED | OUTPATIENT
Start: 2024-10-18 | End: 2024-10-19 | Stop reason: HOSPADM

## 2024-10-18 RX ORDER — LEVETIRACETAM 500 MG/5ML
500 INJECTION, SOLUTION, CONCENTRATE INTRAVENOUS EVERY 12 HOURS SCHEDULED
Status: DISCONTINUED | OUTPATIENT
Start: 2024-10-18 | End: 2024-10-19 | Stop reason: HOSPADM

## 2024-10-18 RX ORDER — ACETAMINOPHEN 160 MG/5ML
650 SOLUTION ORAL EVERY 4 HOURS PRN
Status: DISCONTINUED | OUTPATIENT
Start: 2024-10-18 | End: 2024-10-19 | Stop reason: HOSPADM

## 2024-10-18 RX ORDER — ACETAMINOPHEN 325 MG/1
650 TABLET ORAL EVERY 4 HOURS PRN
Status: DISCONTINUED | OUTPATIENT
Start: 2024-10-18 | End: 2024-10-19 | Stop reason: HOSPADM

## 2024-10-18 RX ORDER — AMOXICILLIN 250 MG
2 CAPSULE ORAL 2 TIMES DAILY PRN
Status: DISCONTINUED | OUTPATIENT
Start: 2024-10-18 | End: 2024-10-18

## 2024-10-18 RX ORDER — POLYETHYLENE GLYCOL 3350 17 G/17G
17 POWDER, FOR SOLUTION ORAL DAILY PRN
Status: DISCONTINUED | OUTPATIENT
Start: 2024-10-18 | End: 2024-10-18

## 2024-10-18 RX ORDER — MIDAZOLAM HYDROCHLORIDE 1 MG/ML
1 INJECTION INTRAMUSCULAR; INTRAVENOUS EVERY 4 HOURS PRN
Status: DISCONTINUED | OUTPATIENT
Start: 2024-10-18 | End: 2024-10-19 | Stop reason: HOSPADM

## 2024-10-18 RX ADMIN — MORPHINE SULFATE 2 MG: 2 INJECTION, SOLUTION INTRAMUSCULAR; INTRAVENOUS at 17:40

## 2024-10-18 RX ADMIN — GLYCOPYRROLATE 0.4 MG: 0.2 INJECTION INTRAMUSCULAR; INTRAVENOUS at 17:40

## 2024-10-18 RX ADMIN — Medication 10 ML: at 01:29

## 2024-10-18 RX ADMIN — Medication 10 ML: at 20:17

## 2024-10-18 RX ADMIN — GLYCOPYRROLATE 0.4 MG: 0.2 INJECTION INTRAMUSCULAR; INTRAVENOUS at 23:16

## 2024-10-18 RX ADMIN — MORPHINE SULFATE 2 MG: 2 INJECTION, SOLUTION INTRAMUSCULAR; INTRAVENOUS at 12:57

## 2024-10-18 RX ADMIN — GLYCOPYRROLATE 0.4 MG: 0.2 INJECTION INTRAMUSCULAR; INTRAVENOUS at 12:47

## 2024-10-18 RX ADMIN — LEVETIRACETAM 500 MG: 100 INJECTION, SOLUTION INTRAVENOUS at 09:09

## 2024-10-18 RX ADMIN — LEVETIRACETAM 500 MG: 100 INJECTION, SOLUTION INTRAVENOUS at 20:17

## 2024-10-18 NOTE — H&P
[facility] Medicine Services  HISTORY AND PHYSICAL    Patient Name: Mónica Carrillo  : 1945  MRN: [id number]  Primary Care Physician: Molina Domínguez MD  Date of admission: 10/17/2024    Subjective   Subjective     Chief Complaint:  Seizure, no prior history    HPI:  Mónica Carrillo is a 79 y.o. female with significant medical history of severe dementia, currently in resident at [facility], history of iron deficiency anemia, HTN, hypothyroidism who presents to [facility] ED with status epilepticus.       HPI was obtained by medical record and next of kin.  Adult son at bedside states that [facility] contacted him at home on 10/18 at approximately 823 to inform him that his mother was having a seizure.  He elected at that time to have her transported via EMS to [facility] ED for evaluation.  He states that he is unaware of any seizure history for his mother.  Son states that his mother has been bedbound for 8 years, is an advanced stage of dementia, and has a progressive decline in health this year.  He states that his mother's oral intake is very diminished, sometimes not eating for 4 to 5 days at a time.  Patient is unable to answer review of system and orientation questions, but is resting comfortably in bed.      In ED  Patient received lacosamide, Keppra, and Versed.  1 liter IVF    CT of head  There is diminished flow noted within the distal left anterior cerebral artery branches which likely supply a small area of diminished perfusion within the left frontal periventricular white matter. No definite infarct.     EKG sinus tach with occasional PVC    Review of Systems   Unable to perform ROS: Acuity of condition              Personal History     Past Medical History:   Diagnosis Date    Dementia     Hypertension     Mood disorder     Thyroid disease              History reviewed. No pertinent surgical history.    Family History: family history includes  Cardiac arrest in her son; No Known Problems in her father and mother.     Social History:  reports that she has never smoked. She has never been exposed to tobacco smoke. She has never used smokeless tobacco. She reports that she does not drink alcohol and does not use drugs.  Social History     Social History Narrative    Not on file       Medications:  Ergocalciferol, Multiple Vitamins-Minerals, acetaminophen, atorvastatin, docusate sodium, ferrous sulfate, folic acid, levothyroxine, magnesium hydroxide, metoprolol tartrate, mirtazapine, pantoprazole, polyvinyl alcohol, and risperiDONE    No Known Allergies    Objective   Objective     Vital Signs:   Temp:  [98.1 °F (36.7 °C)] 98.1 °F (36.7 °C)  Heart Rate:  [101-126] 102  Resp:  [14-22] 20  BP: (100-114)/(54-75) 114/65  Flow (L/min) (Oxygen Therapy):  [15] 15    Physical Exam  Constitutional:       General: She is sleeping.   HENT:      Head: Normocephalic and atraumatic.   Cardiovascular:      Rate and Rhythm: Regular rhythm. Tachycardia present.   Pulmonary:      Effort: Respiratory distress present.      Comments: Patient requiring a nonrebreather mask  Abdominal:      General: Abdomen is flat.      Palpations: Abdomen is soft.   Musculoskeletal:      Right lower leg: Edema present.      Left lower leg: Edema present.   Neurological:      Mental Status: She is lethargic and unresponsive.      GCS: GCS eye subscore is 1. GCS verbal subscore is 1. GCS motor subscore is 2.        Result Review:  I have personally reviewed the results from the time of this admission to 10/18/2024 00:55 EDT and agree with these findings:  [x]  Laboratory list / accordion  []  Microbiology  [x]  Radiology  [x]  EKG/Telemetry   []  Cardiology/Vascular   []  Pathology  [x]  Old records  []  Other:      LAB RESULTS:      Lab 10/17/24  2132 10/17/24  2118   WBC  --  10.16   HEMOGLOBIN  --  7.0*   HEMOGLOBIN, POC 7.1*  --    HEMATOCRIT  --  23.6*   HEMATOCRIT POC 21*  --    PLATELETS   --  251   NEUTROS ABS  --  8.41*   IMMATURE GRANS (ABS)  --  0.06*   LYMPHS ABS  --  0.84   MONOS ABS  --  0.74   EOS ABS  --  0.07   MCV  --  80.5   LACTATE  --  6.9*   APTT  --  29.2         Lab 10/17/24  2132 10/17/24  2118   SODIUM  --  138   POTASSIUM  --  5.2   CHLORIDE  --  101   CO2  --  24.0   ANION GAP  --  13.0   BUN  --  15   CREATININE 1.00 1.00   EGFR 56.7* 56.7*   GLUCOSE  --  120*   CALCIUM  --  8.5*   MAGNESIUM  --  1.8         Lab 10/17/24  2118   TOTAL PROTEIN 6.6   ALBUMIN 2.0*   GLOBULIN 4.6   ALT (SGPT) 6   AST (SGOT) 17   BILIRUBIN 0.3   ALK PHOS 98         Lab 10/17/24  2118   HSTROP T 88*                 Brief Urine Lab Results  (Last result in the past 365 days)        Color   Clarity   Blood   Leuk Est   Nitrite   Protein   CREAT   Urine HCG        06/05/24 1502 Yellow   Cloudy   Trace   Large (3+)   Negative   Negative                 Microbiology Results (last 10 days)       ** No results found for the last 240 hours. **            CT Angiogram Head w AI Analysis of LVO    Result Date: 10/17/2024  CT CEREBRAL PERFUSION W WO CONTRAST, CT ANGIOGRAM NECK, CT ANGIOGRAM HEAD W AI ANALYSIS OF LVO Date of Exam: 10/17/2024 9:42 PM EDT Indication: Neuro Deficit, acute, Stroke suspected Neuro deficit, acute stroke suspected.  Comparison: 7/17/2022 Technique: Axial CT images of the brain were obtained prior to and after the administration of 120 cc Isovue-370 IV contrast . Core blood volume, core blood flow, mean transit time, and Tmax images were obtained utilizing the Rapid software protocol. A limited CT angiogram of the head was also performed to measure the blood vessel density. The radiation dose reduction device was turned on for each scan per the ALARA (As Low as Reasonably Achievable) protocol.  CTA of the head and neck was performed after the uneventful intravenous administration of iodinated contrast.  Reconstructed coronal and sagittal images were also obtained. In addition, a 3-D  volume rendered image was created for interpretation. Automated exposure control and iterative reconstruction methods were used.  FINDINGS: There is approximately 24 cc mildly elevated Tmax greater than 4 seconds with patchy involvement of the frontal lobes. No significant elevation of the Tmax is noted. Approximately 4 cc of decreased vertebral blood flow involving the left frontal periventricular white matter. No decreased white blood volume. The arch of the aorta is normal. Vertebral the right common carotid. Mild atheromatous disease of the right carotid bulb and proximal right internal carotid artery. Mild atheromatous disease involving the intracranial segments of the right internal carotid artery. The right carotid terminus is normal. The visualized branches of the right anterior and middle cerebral arteries The left common carotid artery is normal. Mild atheromatous disease of the proximal left internal carotid artery. Mild atheromatous disease involving the intracranial segments of the left internal carotid artery. The left carotid terminus is normal. The left A1 and A2 segments are patent. There is diminished size and flow within the left A3 branch which maintains tenuous flow. The smaller perforating branches likely supply the region of concern. No vessel occlusion. Both vertebral arteries arise from the subclavian arteries. The left vertebral artery is dominant. Both vertebral arteries are small. Both vertebral arteries supply the basilar artery which is also congenitally small. The basilar artery terminates in tiny bilateral posterior cerebral arteries. The majority of the flow to the posterior cerebral arteries is from the anterior circulation via prominent posterior communicating arteries. No abnormal intracranial enhancement. No intracranial hemorrhage. No intracranial mass. Intracranial venous sinuses are patent. The ventricles and sulci are normal in size and configuration. Orbital and periorbital  soft tissues are normal. Complete opacification of the left maxillary sinus with mucoperiosteal reactive changes. The parotid and submandibular glands appear normal. Fibrotic changes at the right upper lobe. No adenopathy.     Impression: There is diminished flow noted within the distal left anterior cerebral artery branches which likely supply a small area of diminished perfusion within the left frontal periventricular white matter. No definite infarct is appreciated however. Electronically Signed: David Heaton MD  10/17/2024 10:08 PM EDT  Workstation ID: [id number]    CT Angiogram Neck    Result Date: 10/17/2024  CT CEREBRAL PERFUSION W WO CONTRAST, CT ANGIOGRAM NECK, CT ANGIOGRAM HEAD W AI ANALYSIS OF LVO Date of Exam: 10/17/2024 9:42 PM EDT Indication: Neuro Deficit, acute, Stroke suspected Neuro deficit, acute stroke suspected.  Comparison: 7/17/2022 Technique: Axial CT images of the brain were obtained prior to and after the administration of 120 cc Isovue-370 IV contrast . Core blood volume, core blood flow, mean transit time, and Tmax images were obtained utilizing the Rapid software protocol. A limited CT angiogram of the head was also performed to measure the blood vessel density. The radiation dose reduction device was turned on for each scan per the ALARA (As Low as Reasonably Achievable) protocol.  CTA of the head and neck was performed after the uneventful intravenous administration of iodinated contrast.  Reconstructed coronal and sagittal images were also obtained. In addition, a 3-D volume rendered image was created for interpretation. Automated exposure control and iterative reconstruction methods were used.  FINDINGS: There is approximately 24 cc mildly elevated Tmax greater than 4 seconds with patchy involvement of the frontal lobes. No significant elevation of the Tmax is noted. Approximately 4 cc of decreased vertebral blood flow involving the left frontal periventricular white matter. No  decreased white blood volume. The arch of the aorta is normal. Vertebral the right common carotid. Mild atheromatous disease of the right carotid bulb and proximal right internal carotid artery. Mild atheromatous disease involving the intracranial segments of the right internal carotid artery. The right carotid terminus is normal. The visualized branches of the right anterior and middle cerebral arteries The left common carotid artery is normal. Mild atheromatous disease of the proximal left internal carotid artery. Mild atheromatous disease involving the intracranial segments of the left internal carotid artery. The left carotid terminus is normal. The left A1 and A2 segments are patent. There is diminished size and flow within the left A3 branch which maintains tenuous flow. The smaller perforating branches likely supply the region of concern. No vessel occlusion. Both vertebral arteries arise from the subclavian arteries. The left vertebral artery is dominant. Both vertebral arteries are small. Both vertebral arteries supply the basilar artery which is also congenitally small. The basilar artery terminates in tiny bilateral posterior cerebral arteries. The majority of the flow to the posterior cerebral arteries is from the anterior circulation via prominent posterior communicating arteries. No abnormal intracranial enhancement. No intracranial hemorrhage. No intracranial mass. Intracranial venous sinuses are patent. The ventricles and sulci are normal in size and configuration. Orbital and periorbital soft tissues are normal. Complete opacification of the left maxillary sinus with mucoperiosteal reactive changes. The parotid and submandibular glands appear normal. Fibrotic changes at the right upper lobe. No adenopathy.     Impression: There is diminished flow noted within the distal left anterior cerebral artery branches which likely supply a small area of diminished perfusion within the left frontal  periventricular white matter. No definite infarct is appreciated however. Electronically Signed: David Heaton MD  10/17/2024 10:08 PM EDT  Workstation ID: [id number]    CT CEREBRAL PERFUSION WITH & WITHOUT CONTRAST    Result Date: 10/17/2024  CT CEREBRAL PERFUSION W WO CONTRAST, CT ANGIOGRAM NECK, CT ANGIOGRAM HEAD W AI ANALYSIS OF LVO Date of Exam: 10/17/2024 9:42 PM EDT Indication: Neuro Deficit, acute, Stroke suspected Neuro deficit, acute stroke suspected.  Comparison: 7/17/2022 Technique: Axial CT images of the brain were obtained prior to and after the administration of 120 cc Isovue-370 IV contrast . Core blood volume, core blood flow, mean transit time, and Tmax images were obtained utilizing the Rapid software protocol. A limited CT angiogram of the head was also performed to measure the blood vessel density. The radiation dose reduction device was turned on for each scan per the ALARA (As Low as Reasonably Achievable) protocol.  CTA of the head and neck was performed after the uneventful intravenous administration of iodinated contrast.  Reconstructed coronal and sagittal images were also obtained. In addition, a 3-D volume rendered image was created for interpretation. Automated exposure control and iterative reconstruction methods were used.  FINDINGS: There is approximately 24 cc mildly elevated Tmax greater than 4 seconds with patchy involvement of the frontal lobes. No significant elevation of the Tmax is noted. Approximately 4 cc of decreased vertebral blood flow involving the left frontal periventricular white matter. No decreased white blood volume. The arch of the aorta is normal. Vertebral the right common carotid. Mild atheromatous disease of the right carotid bulb and proximal right internal carotid artery. Mild atheromatous disease involving the intracranial segments of the right internal carotid artery. The right carotid terminus is normal. The visualized branches of the right anterior and  middle cerebral arteries The left common carotid artery is normal. Mild atheromatous disease of the proximal left internal carotid artery. Mild atheromatous disease involving the intracranial segments of the left internal carotid artery. The left carotid terminus is normal. The left A1 and A2 segments are patent. There is diminished size and flow within the left A3 branch which maintains tenuous flow. The smaller perforating branches likely supply the region of concern. No vessel occlusion. Both vertebral arteries arise from the subclavian arteries. The left vertebral artery is dominant. Both vertebral arteries are small. Both vertebral arteries supply the basilar artery which is also congenitally small. The basilar artery terminates in tiny bilateral posterior cerebral arteries. The majority of the flow to the posterior cerebral arteries is from the anterior circulation via prominent posterior communicating arteries. No abnormal intracranial enhancement. No intracranial hemorrhage. No intracranial mass. Intracranial venous sinuses are patent. The ventricles and sulci are normal in size and configuration. Orbital and periorbital soft tissues are normal. Complete opacification of the left maxillary sinus with mucoperiosteal reactive changes. The parotid and submandibular glands appear normal. Fibrotic changes at the right upper lobe. No adenopathy.     Impression: There is diminished flow noted within the distal left anterior cerebral artery branches which likely supply a small area of diminished perfusion within the left frontal periventricular white matter. No definite infarct is appreciated however. Electronically Signed: David Heaton MD  10/17/2024 10:08 PM EDT  Workstation ID: [id number]    CT Head Without Contrast    Result Date: 10/17/2024  CT HEAD WO CONTRAST Date of Exam: 10/17/2024 9:32 PM EDT Indication: seizure, ams. Comparison: 7/17/2022 Technique: Axial CT images were obtained of the head without contrast  administration.  Automated exposure control and iterative construction methods were used. Findings: There is no evidence of acute intracranial hemorrhage, mass, midline shift, new loss of gray-white matter differentiation, or extra-axial fluid collection. There is subcortical and periventricular white matter hypodensity, asymmetrically increased in the  left frontal lobe, similar to prior exam. This may reflect chronic small vessel ischemic disease. There is global parenchymal volume loss with ex vacuo dilation of the ventricular system. Basal cisterns are patent. There is no evidence of acute fracture. Near complete opacification of the left maxillary sinus. Frothy secretions in the left sphenoid sinus. Mastoid air cells are clear. Orbits and included soft tissues show no acute abnormality..     Impression: Impression: No acute intracranial abnormality. Chronic findings as above. Electronically Signed: Nish Bangura MD  10/17/2024 9:49 PM EDT  Workstation ID: [id number]         Assessment & Plan   Assessment & Plan       Status epilepticus    Anemia    Essential hypertension    Hypothyroidism    Hyperlipidemia    Dementia    Acute respiratory failure with hypoxia    [facility]amb[facility]    Mónica Carrillo is a 79 y.o. female with significant medical history of severe dementia, currently in resident at [facility], history of iron deficiency anemia, HTN, hypothyroidism who presents to [facility] ED with status epilepticus.     [facility]  Status  epilepticus  Respiratory failure with hypoxia  -No history of seizure  -Given Versed, lactamide, and Keppra in ED  -GCS 4  -Seizure precautions  -Pain management and prevent seizures  -Continue Keppra  -Consult hospice, family interested in pursuing comfort measures only  -Consider spiritual consult    Alzheimer's dementia, severe  -Hold Remeron and risperidone due to decreased LOC    Hypothyroidism  -Hold Synthroid    Hypertension  -BP stable   -Hold  metoprolol    Anemia  -Hemoglobin 7  -Family refuses blood products      **Patient in pain due to decreased level of responsiveness.  Holding all meds at this time.         DVT prophylaxis: Comfort care only     CODE STATUS: DNR/DNI  Medical Intervention Limits: No intubation (DNI); No antiarrhythmic drugs; No blood products; No cardioversion; No dialysis; No vasopressors; No artificial nutrition; No NIPPV (Non-Invasive Positive Pressure Ventilation)  Level Of Support Discussed With: Next of Kin (If No Surrogate)  Code Status (Patient has no pulse and is not breathing): No CPR (Do Not Attempt to Resuscitate)  Medical Interventions (Patient has pulse or is breathing): Limited Support      Expected Discharge  Expected discharge date/ time has not been documented.      This note has been completed as part of a split-shared workflow.     Signature: Electronically signed by PAOLO Lowe, 10/18/24, 12:32 AM EDT

## 2024-10-18 NOTE — ED NOTES
" Mónica Carrillo    Nursing Report ED to Floor:  Mental status: GCS 3  Ambulatory status: non ambulatory   Oxygen Therapy:  NRB 15L  Cardiac Rhythm: sinus tach  Admitted from: [facility]  Safety Concerns:  fall risk  Social Issues: non verbal  ED Room #:  19    ED Nurse Phone Extension - [phone] or may call [phone].      HPI:   Chief Complaint   Patient presents with    Seizures       Past Medical History:  Past Medical History:   Diagnosis Date    Dementia     Hypertension     Mood disorder     Thyroid disease         Past Surgical History:  No past surgical history on file.     Admitting Doctor:   Molina Yin MD    Consulting Provider(s):  Consults       No orders found from 9/18/2024 to 10/18/2024.             Admitting Diagnosis:   The primary encounter diagnosis was Status epilepticus. Diagnoses of [place] coma scale total score 3-8, in the field (EMT or ambulance) and Lactic acidemia were also pertinent to this visit.    Most Recent Vitals:   Vitals:    10/17/24 2115 10/17/24 2116 10/17/24 2219 10/17/24 2305   BP: 100/75  100/54 107/66   BP Location:   Right arm Right arm   Patient Position:   Lying Lying   Pulse:  (!) 126 101 102   Resp:  22 22 18   Temp:    98.1 °F (36.7 °C)   TempSrc:    Axillary   SpO2:  100% 100% 98%   Weight:  70.8 kg (156 lb)     Height:  172.7 cm (68\")         Active LDAs/IV Access:   Lines, Drains & Airways       Active LDAs       Name Placement date Placement time Site Days    Peripheral IV 10/17/24 2119 Right Forearm 10/17/24  2119  Forearm  less than 1    Peripheral IV 10/17/24 2120 Left;Posterior Hand 10/17/24  2120  Hand  less than 1                    Labs (abnormal labs have a star):   Labs Reviewed   COMPREHENSIVE METABOLIC PANEL - Abnormal; Notable for the following components:       Result Value    Glucose 120 (*)     Calcium 8.5 (*)     Albumin 2.0 (*)     eGFR 56.7 (*)     All other components within normal limits    Narrative:     GFR Normal >60  Chronic Kidney Disease " <60  Kidney Failure <15    The GFR formula is only valid for adults with stable renal function between ages 18 and 70.   SINGLE HS TROPONIN T - Abnormal; Notable for the following components:    HS Troponin T 88 (*)     All other components within normal limits    Narrative:     High Sensitive Troponin T Reference Range:  <14.0 ng/L- Negative Female for AMI  <22.0 ng/L- Negative Male for AMI  >=14 - Abnormal Female indicating possible myocardial injury.  >=22 - Abnormal Male indicating possible myocardial injury.   Clinicians would have to utilize clinical acumen, EKG, Troponin, and serial changes to determine if it is an Acute Myocardial Infarction or myocardial injury due to an underlying chronic condition.        CBC WITH AUTO DIFFERENTIAL - Abnormal; Notable for the following components:    RBC 2.93 (*)     Hemoglobin 7.0 (*)     Hematocrit 23.6 (*)     MCH 23.9 (*)     MCHC 29.7 (*)     RDW 23.3 (*)     RDW-SD 67.2 (*)     Neutrophil % 82.7 (*)     Lymphocyte % 8.3 (*)     Immature Grans % 0.6 (*)     Neutrophils, Absolute 8.41 (*)     Immature Grans, Absolute 0.06 (*)     All other components within normal limits   LACTIC ACID, PLASMA - Abnormal; Notable for the following components:    Lactate 6.9 (*)     All other components within normal limits   POCT CHEM 8 - Abnormal; Notable for the following components:    Sodium 137 (*)     Total CO2 21 (*)     Hemoglobin 7.1 (*)     Hematocrit 21 (*)     Ionized Calcium 1.17 (*)     eGFR 56.7 (*)     All other components within normal limits   MAGNESIUM - Normal   ACETAMINOPHEN LEVEL - Normal   ETHANOL - Normal    Narrative:     Elevated lactic acid concentration and lactate dehydrogenase(LD) activity may falsely elevate enzymatically determined ethanol levels. Not for legal purposes.    SALICYLATE LEVEL - Normal   APTT - Normal    Narrative:     PTT = The equivalent PTT values for the therapeutic range of heparin levels at 0.3 to 0.5 U/ml are 60 to 70 seconds.   POCT  GLUCOSE FINGERSTICK - Normal   RAINBOW DRAW    Narrative:     The following orders were created for panel order Bud Draw.  Procedure                               Abnormality         Status                     ---------                               -----------         ------                     Green Top (Gel)[[id number]]                                  Final result               Lavender Top[[id number]]                                     Final result               Gold Top - SST[[id number]]                                   Final result               Lewis Top[[id number]]                                         Final result               Light Blue Top[[id number]]                                   Final result                 Please view results for these tests on the individual orders.   POCT GLUCOSE FINGERSTICK   POCT CHEM 8   POCT CHEM 8   CBC AND DIFFERENTIAL    Narrative:     The following orders were created for panel order CBC & Differential.  Procedure                               Abnormality         Status                     ---------                               -----------         ------                     CBC Auto Differential[[id number]]        Abnormal            Final result               Scan Slide[[id number]]                                                                    Please view results for these tests on the individual orders.   GREEN TOP   LAVENDER TOP   GOLD TOP - SST   GRAY TOP   LIGHT BLUE TOP       Meds Given in ED:   Medications   sodium chloride 0.9 % flush 10 mL (has no administration in time range)   sodium chloride 0.9 % flush 10 mL (has no administration in time range)   levETIRAcetam (KEPPRA) injection 1,500 mg (1,500 mg Intravenous Given 10/17/24 2119)   lactated ringers bolus 1,000 mL (1,000 mL Intravenous New Bag 10/17/24 2124)   midazolam (VERSED) injection 2 mg (2 mg Intravenous Given 10/17/24 2130)   midazolam (VERSED) injection 2 mg (2 mg Intravenous Given 10/17/24  2151)   lacosamide (VIMPAT) injection 200 mg (200 mg Intravenous Given 10/17/24 2201)   iopamidol (ISOVUE-370) 76 % injection 120 mL (120 mL Intravenous Given 10/17/24 2157)           Last NIH score:  Interval: baseline  1a. Level of Consciousness: 3-->Responds only with reflex motor or autonomic effects or totally unresponsive, flaccid, and areflexic  1b. LOC Questions: 2-->Answers neither question correctly  1c. LOC Commands: 2-->Performs neither task correctly  2. Best Gaze: 0-->Normal  3. Visual: 0-->No visual loss  4. Facial Palsy: 0-->Normal symmetrical movements  5a. Motor Arm, Left: 3-->No effort against gravity, limb falls  5b. Motor Arm, Right: 3-->No effort against gravity, limb falls  6a. Motor Leg, Left: 3-->No effort against gravity, leg falls to bed immediately  6b. Motor Leg, Right: 3-->No effort against gravity, leg falls to bed immediately  7. Limb Ataxia: 0-->Absent  8. Sensory: 2-->Severe to total sensory loss, patient is not aware of being touched in the face, arm, and leg  9. Best Language: 3-->Mute, global aphasia, no usable speech or auditory comprehension  10. Dysarthria: 2-->Severe dysarthria, patients speech is so slurred as to be unintelligible in the absence of or out of proportion to any dysphasia, or is mute/anarthric  11. Extinction and Inattention (formerly Neglect): 0-->No abnormality    Total (NIH Stroke Scale): 26     Dysphagia screening results:        [place] Coma Scale:  No data recorded     CIWA:        Restraint Type:            Isolation Status:  No active isolations

## 2024-10-18 NOTE — PROGRESS NOTES
Nutrition Services    Patient Name:  Mónica Carrillo  YOB: 1945  MRN: [id number]  Admit Date:  10/17/2024    Patient screened per protocol. Chart reviewed. Noted patient transitioned to CMO with possibility of inpatient hospice. RDN will sign off at this time. Available via consult.     Electronically signed by:  Tarah Toribio MS,RD,LD  10/18/24 12:26 EDT

## 2024-10-18 NOTE — PROGRESS NOTES
[facility] Medicine Services  PROGRESS NOTE    Patient Name: Mónica Carrillo  : 1945  MRN: [id number]    Date of Admission: 10/17/2024  Primary Care Physician: Molina Domínguez MD    Subjective   Subjective     CC:  Seizure-like activity, altered mental status    HPI:  Patient awake but nonverbal. Unable to participate in history or exam. Multiple family members at bedside.      Objective   Objective     Vital Signs:   Temp:  [98 °F (36.7 °C)-98.2 °F (36.8 °C)] 98.2 °F (36.8 °C)  Heart Rate:  [] 79  Resp:  [14-24] 20  BP: ()/(54-75) 97/54  Flow (L/min) (Oxygen Therapy):  [2-15] 2     Physical Exam:  Constitutional: Awake, resting comfortably  HENT: NCAT, mucous membranes moist  Respiratory: Clear to auscultation bilaterally, respiratory effort normal   Cardiovascular: RRR, no murmurs, rubs, or gallops  Gastrointestinal: soft, nontender, nondistended  Musculoskeletal: No bilateral ankle edema  Neurologic: Awake, left side of mouth twitching, hands curled in fist bilaterally, nonverbal, unable to complete full neurologic exam due to patient's clinical status  Skin: No rashes      Results Reviewed:  LAB RESULTS:      Lab 10/17/24  2132 10/17/24  2118   WBC  --  10.16   HEMOGLOBIN  --  7.0*   HEMOGLOBIN, POC 7.1*  --    HEMATOCRIT  --  23.6*   HEMATOCRIT POC 21*  --    PLATELETS  --  251   NEUTROS ABS  --  8.41*   IMMATURE GRANS (ABS)  --  0.06*   LYMPHS ABS  --  0.84   MONOS ABS  --  0.74   EOS ABS  --  0.07   MCV  --  80.5   LACTATE  --  6.9*   APTT  --  29.2   HSTROP T  --  88*         Lab 10/17/24  2132 10/17/24  2118   SODIUM  --  138   POTASSIUM  --  5.2   CHLORIDE  --  101   CO2  --  24.0   ANION GAP  --  13.0   BUN  --  15   CREATININE 1.00 1.00   EGFR 56.7* 56.7*   GLUCOSE  --  120*   CALCIUM  --  8.5*   MAGNESIUM  --  1.8         Lab 10/17/24  2118   TOTAL PROTEIN 6.6   ALBUMIN 2.0*   GLOBULIN 4.6   ALT (SGPT) 6   AST (SGOT) 17   BILIRUBIN 0.3   ALK PHOS 98          Lab 10/17/24  2118   HSTROP T 88*                 Brief Urine Lab Results  (Last result in the past 365 days)        Color   Clarity   Blood   Leuk Est   Nitrite   Protein   CREAT   Urine HCG        06/05/24 1502 Yellow   Cloudy   Trace   Large (3+)   Negative   Negative                   Microbiology Results Abnormal       None            CT Angiogram Head w AI Analysis of LVO    Result Date: 10/17/2024  CT CEREBRAL PERFUSION W WO CONTRAST, CT ANGIOGRAM NECK, CT ANGIOGRAM HEAD W AI ANALYSIS OF LVO Date of Exam: 10/17/2024 9:42 PM EDT Indication: Neuro Deficit, acute, Stroke suspected Neuro deficit, acute stroke suspected.  Comparison: 7/17/2022 Technique: Axial CT images of the brain were obtained prior to and after the administration of 120 cc Isovue-370 IV contrast . Core blood volume, core blood flow, mean transit time, and Tmax images were obtained utilizing the Rapid software protocol. A limited CT angiogram of the head was also performed to measure the blood vessel density. The radiation dose reduction device was turned on for each scan per the ALARA (As Low as Reasonably Achievable) protocol.  CTA of the head and neck was performed after the uneventful intravenous administration of iodinated contrast.  Reconstructed coronal and sagittal images were also obtained. In addition, a 3-D volume rendered image was created for interpretation. Automated exposure control and iterative reconstruction methods were used.  FINDINGS: There is approximately 24 cc mildly elevated Tmax greater than 4 seconds with patchy involvement of the frontal lobes. No significant elevation of the Tmax is noted. Approximately 4 cc of decreased vertebral blood flow involving the left frontal periventricular white matter. No decreased white blood volume. The arch of the aorta is normal. Vertebral the right common carotid. Mild atheromatous disease of the right carotid bulb and proximal right internal carotid artery. Mild atheromatous  disease involving the intracranial segments of the right internal carotid artery. The right carotid terminus is normal. The visualized branches of the right anterior and middle cerebral arteries The left common carotid artery is normal. Mild atheromatous disease of the proximal left internal carotid artery. Mild atheromatous disease involving the intracranial segments of the left internal carotid artery. The left carotid terminus is normal. The left A1 and A2 segments are patent. There is diminished size and flow within the left A3 branch which maintains tenuous flow. The smaller perforating branches likely supply the region of concern. No vessel occlusion. Both vertebral arteries arise from the subclavian arteries. The left vertebral artery is dominant. Both vertebral arteries are small. Both vertebral arteries supply the basilar artery which is also congenitally small. The basilar artery terminates in tiny bilateral posterior cerebral arteries. The majority of the flow to the posterior cerebral arteries is from the anterior circulation via prominent posterior communicating arteries. No abnormal intracranial enhancement. No intracranial hemorrhage. No intracranial mass. Intracranial venous sinuses are patent. The ventricles and sulci are normal in size and configuration. Orbital and periorbital soft tissues are normal. Complete opacification of the left maxillary sinus with mucoperiosteal reactive changes. The parotid and submandibular glands appear normal. Fibrotic changes at the right upper lobe. No adenopathy.     Impression: There is diminished flow noted within the distal left anterior cerebral artery branches which likely supply a small area of diminished perfusion within the left frontal periventricular white matter. No definite infarct is appreciated however. Electronically Signed: David Heaton MD  10/17/2024 10:08 PM EDT  Workstation ID: [id number]    CT Angiogram Neck    Result Date: 10/17/2024  CT CEREBRAL  PERFUSION W WO CONTRAST, CT ANGIOGRAM NECK, CT ANGIOGRAM HEAD W AI ANALYSIS OF LVO Date of Exam: 10/17/2024 9:42 PM EDT Indication: Neuro Deficit, acute, Stroke suspected Neuro deficit, acute stroke suspected.  Comparison: 7/17/2022 Technique: Axial CT images of the brain were obtained prior to and after the administration of 120 cc Isovue-370 IV contrast . Core blood volume, core blood flow, mean transit time, and Tmax images were obtained utilizing the Rapid software protocol. A limited CT angiogram of the head was also performed to measure the blood vessel density. The radiation dose reduction device was turned on for each scan per the ALARA (As Low as Reasonably Achievable) protocol.  CTA of the head and neck was performed after the uneventful intravenous administration of iodinated contrast.  Reconstructed coronal and sagittal images were also obtained. In addition, a 3-D volume rendered image was created for interpretation. Automated exposure control and iterative reconstruction methods were used.  FINDINGS: There is approximately 24 cc mildly elevated Tmax greater than 4 seconds with patchy involvement of the frontal lobes. No significant elevation of the Tmax is noted. Approximately 4 cc of decreased vertebral blood flow involving the left frontal periventricular white matter. No decreased white blood volume. The arch of the aorta is normal. Vertebral the right common carotid. Mild atheromatous disease of the right carotid bulb and proximal right internal carotid artery. Mild atheromatous disease involving the intracranial segments of the right internal carotid artery. The right carotid terminus is normal. The visualized branches of the right anterior and middle cerebral arteries The left common carotid artery is normal. Mild atheromatous disease of the proximal left internal carotid artery. Mild atheromatous disease involving the intracranial segments of the left internal carotid artery. The left carotid  terminus is normal. The left A1 and A2 segments are patent. There is diminished size and flow within the left A3 branch which maintains tenuous flow. The smaller perforating branches likely supply the region of concern. No vessel occlusion. Both vertebral arteries arise from the subclavian arteries. The left vertebral artery is dominant. Both vertebral arteries are small. Both vertebral arteries supply the basilar artery which is also congenitally small. The basilar artery terminates in tiny bilateral posterior cerebral arteries. The majority of the flow to the posterior cerebral arteries is from the anterior circulation via prominent posterior communicating arteries. No abnormal intracranial enhancement. No intracranial hemorrhage. No intracranial mass. Intracranial venous sinuses are patent. The ventricles and sulci are normal in size and configuration. Orbital and periorbital soft tissues are normal. Complete opacification of the left maxillary sinus with mucoperiosteal reactive changes. The parotid and submandibular glands appear normal. Fibrotic changes at the right upper lobe. No adenopathy.     Impression: There is diminished flow noted within the distal left anterior cerebral artery branches which likely supply a small area of diminished perfusion within the left frontal periventricular white matter. No definite infarct is appreciated however. Electronically Signed: David Heaton MD  10/17/2024 10:08 PM EDT  Workstation ID: [id number]    CT CEREBRAL PERFUSION WITH & WITHOUT CONTRAST    Result Date: 10/17/2024  CT CEREBRAL PERFUSION W WO CONTRAST, CT ANGIOGRAM NECK, CT ANGIOGRAM HEAD W AI ANALYSIS OF LVO Date of Exam: 10/17/2024 9:42 PM EDT Indication: Neuro Deficit, acute, Stroke suspected Neuro deficit, acute stroke suspected.  Comparison: 7/17/2022 Technique: Axial CT images of the brain were obtained prior to and after the administration of 120 cc Isovue-370 IV contrast . Core blood volume, core blood flow,  mean transit time, and Tmax images were obtained utilizing the Rapid software protocol. A limited CT angiogram of the head was also performed to measure the blood vessel density. The radiation dose reduction device was turned on for each scan per the ALARA (As Low as Reasonably Achievable) protocol.  CTA of the head and neck was performed after the uneventful intravenous administration of iodinated contrast.  Reconstructed coronal and sagittal images were also obtained. In addition, a 3-D volume rendered image was created for interpretation. Automated exposure control and iterative reconstruction methods were used.  FINDINGS: There is approximately 24 cc mildly elevated Tmax greater than 4 seconds with patchy involvement of the frontal lobes. No significant elevation of the Tmax is noted. Approximately 4 cc of decreased vertebral blood flow involving the left frontal periventricular white matter. No decreased white blood volume. The arch of the aorta is normal. Vertebral the right common carotid. Mild atheromatous disease of the right carotid bulb and proximal right internal carotid artery. Mild atheromatous disease involving the intracranial segments of the right internal carotid artery. The right carotid terminus is normal. The visualized branches of the right anterior and middle cerebral arteries The left common carotid artery is normal. Mild atheromatous disease of the proximal left internal carotid artery. Mild atheromatous disease involving the intracranial segments of the left internal carotid artery. The left carotid terminus is normal. The left A1 and A2 segments are patent. There is diminished size and flow within the left A3 branch which maintains tenuous flow. The smaller perforating branches likely supply the region of concern. No vessel occlusion. Both vertebral arteries arise from the subclavian arteries. The left vertebral artery is dominant. Both vertebral arteries are small. Both vertebral arteries  supply the basilar artery which is also congenitally small. The basilar artery terminates in tiny bilateral posterior cerebral arteries. The majority of the flow to the posterior cerebral arteries is from the anterior circulation via prominent posterior communicating arteries. No abnormal intracranial enhancement. No intracranial hemorrhage. No intracranial mass. Intracranial venous sinuses are patent. The ventricles and sulci are normal in size and configuration. Orbital and periorbital soft tissues are normal. Complete opacification of the left maxillary sinus with mucoperiosteal reactive changes. The parotid and submandibular glands appear normal. Fibrotic changes at the right upper lobe. No adenopathy.     Impression: There is diminished flow noted within the distal left anterior cerebral artery branches which likely supply a small area of diminished perfusion within the left frontal periventricular white matter. No definite infarct is appreciated however. Electronically Signed: David Heaton MD  10/17/2024 10:08 PM EDT  Workstation ID: [id number]    CT Head Without Contrast    Result Date: 10/17/2024  CT HEAD WO CONTRAST Date of Exam: 10/17/2024 9:32 PM EDT Indication: seizure, ams. Comparison: 7/17/2022 Technique: Axial CT images were obtained of the head without contrast administration.  Automated exposure control and iterative construction methods were used. Findings: There is no evidence of acute intracranial hemorrhage, mass, midline shift, new loss of gray-white matter differentiation, or extra-axial fluid collection. There is subcortical and periventricular white matter hypodensity, asymmetrically increased in the  left frontal lobe, similar to prior exam. This may reflect chronic small vessel ischemic disease. There is global parenchymal volume loss with ex vacuo dilation of the ventricular system. Basal cisterns are patent. There is no evidence of acute fracture. Near complete opacification of the left  maxillary sinus. Frothy secretions in the left sphenoid sinus. Mastoid air cells are clear. Orbits and included soft tissues show no acute abnormality..     Impression: Impression: No acute intracranial abnormality. Chronic findings as above. Electronically Signed: Nish Bangura MD  10/17/2024 9:49 PM EDT  Workstation ID: [id number]         Current medications:  Scheduled Meds:glycopyrrolate, 0.4 mg, Intravenous, Q6H  levETIRAcetam, 500 mg, Intravenous, Q12H  sodium chloride, 10 mL, Intravenous, Q12H      Continuous Infusions:   PRN Meds:.  acetaminophen **OR** acetaminophen **OR** acetaminophen    midazolam    morphine    Morphine    sodium chloride    sodium chloride    sodium chloride    Assessment & Plan   Assessment & Plan     Active Hospital Problems    Diagnosis  POA    **Status epilepticus [G40.901]  Yes    Acute respiratory failure with hypoxia [J96.01]  Unknown    Somnambulance [F51.3]  Unknown    Essential hypertension [I10]  Yes    Hypothyroidism [E03.9]  Yes    Hyperlipidemia [E78.5]  Yes    Dementia [F03.90]  Yes    Anemia [D64.9]  Yes      Resolved Hospital Problems   No resolved problems to display.        Brief Hospital Course to date:  Mónica Carrillo is a 79 y.o. female with significant medical history of severe dementia, currently in resident at [facility], history of iron deficiency anemia, HTN, hypothyroidism, CVA who presented to [facility] ED with seizure-like activity and found to be in status epilepticus.     Status epilepticus  Goals of care  -Patient found to be in status epilepticus on arrival to the ER, Stroke Neurology evaluated. Continue Keppra 500 mg twice daily.  -Ultimately patient's family opted for comfort measures.  -Continue comfort care measures. Palliative and hospice following. Has been evaluating for inpatient eligibility.    Poor oral intake  -worsening for months, per family  -No plan for feeding tube or enteral nutrition.    Expected Discharge Location and  Transportation: hospice  Expected Discharge   Expected discharge date/ time has not been documented.     VTE Prophylaxis:  Mechanical VTE prophylaxis orders are present.         AM-PAC 6 Clicks Score (PT): 22 (10/18/24 0014)    CODE STATUS:   Code Status and Medical Interventions: No CPR (Do Not Attempt to Resuscitate); Comfort Measures   Ordered at: 10/18/24 1217     Code Status (Patient has no pulse and is not breathing):    No CPR (Do Not Attempt to Resuscitate)     Medical Interventions (Patient has pulse or is breathing):    Comfort Measures       Kerrie Tillman, DO  10/18/24

## 2024-10-18 NOTE — CONSULTS
Continued Stay Note  [facility]     Patient Name: Mónica Carrillo  MRN: [id number]  Today's Date: 10/18/2024    Admit Date: 10/17/2024    Plan: Inpatient hospice to assess tomorrow   Discharge Plan       Row Name 10/18/24 1230       Plan    Plan Inpatient hospice to assess tomorrow    Plan Comments Hospice referral received, chart reviewed. Visit made to pt, pt's two sons, a daughter and granddaughter present. Pt with eyes closed, no response during the visit. Pt brought to the ED last evening with seizure activities. Family stated pt has previously had a stroke. Pt has been living in a long term care facility for the past 8 years, family reports since May of this year pt has declined and has not wanted to eat or drink. Pt with oxygen on, noted pt's breathing to be slightly labored during the visit. Teaching done on inpatient hospice vs outpatient hospice. At this time pt is limited support and has not received any prn medications for symptom management therefore pt does not meet criteria at this time for inpatient hospice.  Family stated the goal of care is comfort and is requesting pt to be changed to comfort measures instead of limited support. Dr. Tillman made a visit and was informed that the family has opted for comfort measures. Dr. Tillman stated will order morphine prn for any pain or shortness of breath pt may experience. Informed family that the inpatient hospice team will make a visit tomorrow to assess if pt is appropriate for inpatient hospice. Family agreeable to the plan. Will continue to follow. Please call [phone] if can be of further assistance.                   Discharge Codes    No documentation.                       Yadi Denney RN

## 2024-10-18 NOTE — ED PROVIDER NOTES
Subjective   History of Present Illness  Patient presents for evaluation of altered mental state and seizure.  She has no history of seizure.  EMS was called to her living still facility, [facility], for seizure-like activity and patient was still seizing when they arrived.  She received multiple doses of IV benzodiazepines and seizure did seem to terminate.  She has not been responsive for EMS.  Patient is unable to provide any meaningful history of her own at this time.    History provided by:  EMS personnel      Review of Systems    Past Medical History:   Diagnosis Date    Dementia     Hypertension     Mood disorder     Thyroid disease        No Known Allergies    History reviewed. No pertinent surgical history.    Family History   Problem Relation Age of Onset    No Known Problems Mother     No Known Problems Father     Other (Cardiac arrest) Son        Social History     Socioeconomic History    Marital status:    Tobacco Use    Smoking status: Never     Passive exposure: Never    Smokeless tobacco: Never   Vaping Use    Vaping status: Never Used   Substance and Sexual Activity    Alcohol use: Never    Drug use: Never    Sexual activity: Defer           Objective   Physical Exam  Constitutional:       Appearance: She is ill-appearing.      Comments: Unresponsive   HENT:      Head: Normocephalic and atraumatic.   Eyes:      Conjunctiva/sclera: Conjunctivae normal.      Pupils: Pupils are equal, round, and reactive to light.   Cardiovascular:      Rate and Rhythm: Regular rhythm. Tachycardia present.      Pulses: Normal pulses.      Heart sounds: No murmur heard.     No gallop.   Pulmonary:      Comments: Bradypnea with poor respiratory effort  Abdominal:      General: Abdomen is flat. There is no distension.      Tenderness: There is no abdominal tenderness.   Musculoskeletal:         General: No swelling or deformity. Normal range of motion.   Skin:     General: Skin is warm and dry.      Capillary  Refill: Capillary refill takes less than 2 seconds.   Neurological:      Comments: EMV = 4-1-1.         Critical Care    Performed by: Seven Ferrell MD  Authorized by: Seven Ferrell MD    Critical care provider statement:     Critical care time (minutes):  35    Critical care time was exclusive of:  Separately billable procedures and treating other patients    Critical care was necessary to treat or prevent imminent or life-threatening deterioration of the following conditions:  CNS failure or compromise    Critical care was time spent personally by me on the following activities:  Development of treatment plan with patient or surrogate, discussions with consultants, evaluation of patient's response to treatment, examination of patient, obtaining history from patient or surrogate, ordering and performing treatments and interventions, ordering and review of laboratory studies, ordering and review of radiographic studies, pulse oximetry, re-evaluation of patient's condition and review of old charts    I assumed direction of critical care for this patient from another provider in my specialty: no      Care discussed with: admitting provider               ED Course                          Total (NIH Stroke Scale): 33                  Medical Decision Making  Broad differential diagnosis including hypoglycemia, hyponatremia or other metabolic derangement, intracranial bleeding or mass or primary epileptic seizure.  Laboratory and imaging studies were ordered.  Patient was loaded with 1500 mg IV Keppra to prevent further seizure-like activity.  Contacted patient's next of kin to discuss goals of care.  Patient does have DNR DNI paperwork on file.    While in the emergency department patient had multiple recurrent seizures requiring doses of IV benzodiazepines, Vimpat determinate.  She does not have any improvement in her neurologic examination when seizures are terminated.    Multiple family members including patient's  power of  arrived in the emergency department.  We discussed her clinical state, the lab test is available at this time.  Decision is made for patient to be transition to comfort care with no further diagnostic workup.  Their goals are to stop any new seizures, keep their mother pain-free and they are understanding that she may have clinical decline and poor outcome including death.  They are interested in pursuing hospice care, hospice team was consulted over the phone and they will evaluate the patient in the morning.    Problems Addressed:  Morehead coma scale total score 3-8, in the field (EMT or ambulance): complicated acute illness or injury with systemic symptoms that poses a threat to life or bodily functions  Lactic acidemia: complicated acute illness or injury  Status epilepticus: complicated acute illness or injury with systemic symptoms that poses a threat to life or bodily functions    Amount and/or Complexity of Data Reviewed  Independent Historian: EMS     Details: Prehospital course by EMS  External Data Reviewed: notes.     Details: 6/7/2024 reviewed most recent discharge summary when patient was hospitalized for symptomatic anemia  Labs: ordered. Decision-making details documented in ED Course.  Radiology: ordered and independent interpretation performed. Decision-making details documented in ED Course.  ECG/medicine tests: ordered and independent interpretation performed. Decision-making details documented in ED Course.  Discussion of management or test interpretation with external provider(s): Hospital medicine consulted for admission    Risk  Prescription drug management.  Decision regarding hospitalization.  Decision not to resuscitate or to de-escalate care because of poor prognosis.    Critical Care  Total time providing critical care: 35 minutes        Final diagnoses:   Status epilepticus   Camilla coma scale total score 3-8, in the field (EMT or ambulance)   Lactic acidemia       ED  Disposition  ED Disposition       ED Disposition   Decision to Admit    Condition   --    Comment   Level of Care: Med/Surg [1]   Diagnosis: Status epilepticus [064764]   Admitting Physician: JEFFREY HESS [[id number]]   Attending Physician: JEFFREY HESS [[id number]]               Recent Results (from the past 24 hours)   Comprehensive Metabolic Panel    Collection Time: 10/17/24  9:18 PM    Specimen: Blood   Result Value Ref Range    Glucose 120 (H) 65 - 99 mg/dL    BUN 15 8 - 23 mg/dL    Creatinine 1.00 0.57 - 1.00 mg/dL    Sodium 138 136 - 145 mmol/L    Potassium 5.2 3.5 - 5.2 mmol/L    Chloride 101 98 - 107 mmol/L    CO2 24.0 22.0 - 29.0 mmol/L    Calcium 8.5 (L) 8.6 - 10.5 mg/dL    Total Protein 6.6 6.0 - 8.5 g/dL    Albumin 2.0 (L) 3.5 - 5.2 g/dL    ALT (SGPT) 6 1 - 33 U/L    AST (SGOT) 17 1 - 32 U/L    Alkaline Phosphatase 98 39 - 117 U/L    Total Bilirubin 0.3 0.0 - 1.2 mg/dL    Globulin 4.6 gm/dL    A/G Ratio 0.4 g/dL    BUN/Creatinine Ratio 15.0 7.0 - 25.0    Anion Gap 13.0 5.0 - 15.0 mmol/L    eGFR 56.7 (L) >60.0 mL/min/1.73   Single High Sensitivity Troponin T    Collection Time: 10/17/24  9:18 PM    Specimen: Blood   Result Value Ref Range    HS Troponin T 88 (C) <14 ng/L   Magnesium    Collection Time: 10/17/24  9:18 PM    Specimen: Blood   Result Value Ref Range    Magnesium 1.8 1.6 - 2.4 mg/dL   Green Top (Gel)    Collection Time: 10/17/24  9:18 PM   Result Value Ref Range    Extra Tube Hold for add-ons.    Lavender Top    Collection Time: 10/17/24  9:18 PM   Result Value Ref Range    Extra Tube hold for add-on    Gold Top - SST    Collection Time: 10/17/24  9:18 PM   Result Value Ref Range    Extra Tube Hold for add-ons.    Gray Top    Collection Time: 10/17/24  9:18 PM   Result Value Ref Range    Extra Tube Hold for add-ons.    Light Blue Top    Collection Time: 10/17/24  9:18 PM   Result Value Ref Range    Extra Tube Hold for add-ons.    CBC Auto Differential    Collection Time: 10/17/24  9:18 PM     Specimen: Blood   Result Value Ref Range    WBC 10.16 3.40 - 10.80 10*3/mm3    RBC 2.93 (L) 3.77 - 5.28 10*6/mm3    Hemoglobin 7.0 (L) 12.0 - 15.9 g/dL    Hematocrit 23.6 (L) 34.0 - 46.6 %    MCV 80.5 79.0 - 97.0 fL    MCH 23.9 (L) 26.6 - 33.0 pg    MCHC 29.7 (L) 31.5 - 35.7 g/dL    RDW 23.3 (H) 12.3 - 15.4 %    RDW-SD 67.2 (H) 37.0 - 54.0 fl    MPV 8.7 6.0 - 12.0 fL    Platelets 251 140 - 450 10*3/mm3    Neutrophil % 82.7 (H) 42.7 - 76.0 %    Lymphocyte % 8.3 (L) 19.6 - 45.3 %    Monocyte % 7.3 5.0 - 12.0 %    Eosinophil % 0.7 0.3 - 6.2 %    Basophil % 0.4 0.0 - 1.5 %    Immature Grans % 0.6 (H) 0.0 - 0.5 %    Neutrophils, Absolute 8.41 (H) 1.70 - 7.00 10*3/mm3    Lymphocytes, Absolute 0.84 0.70 - 3.10 10*3/mm3    Monocytes, Absolute 0.74 0.10 - 0.90 10*3/mm3    Eosinophils, Absolute 0.07 0.00 - 0.40 10*3/mm3    Basophils, Absolute 0.04 0.00 - 0.20 10*3/mm3    Immature Grans, Absolute 0.06 (H) 0.00 - 0.05 10*3/mm3    nRBC 0.0 0.0 - 0.2 /100 WBC   Lactic Acid, Plasma    Collection Time: 10/17/24  9:18 PM    Specimen: Blood   Result Value Ref Range    Lactate 6.9 (C) 0.5 - 2.0 mmol/L   Acetaminophen Level    Collection Time: 10/17/24  9:18 PM    Specimen: Blood   Result Value Ref Range    Acetaminophen <5.0 0.0 - 30.0 mcg/mL   Ethanol    Collection Time: 10/17/24  9:18 PM    Specimen: Blood   Result Value Ref Range    Ethanol <10 0 - 10 mg/dL   Salicylate Level    Collection Time: 10/17/24  9:18 PM    Specimen: Blood   Result Value Ref Range    Salicylate <0.3 <=30.0 mg/dL   aPTT    Collection Time: 10/17/24  9:18 PM    Specimen: Blood   Result Value Ref Range    PTT 29.2 22.0 - 39.0 seconds   ECG 12 Lead ED Triage Standing Order; Weak / Dizzy / AMS    Collection Time: 10/17/24  9:23 PM   Result Value Ref Range    QT Interval 322 ms    QTC Interval 457 ms   POC Glucose Once    Collection Time: 10/17/24  9:31 PM    Specimen: Blood   Result Value Ref Range    Glucose 111 70 - 130 mg/dL   POC CHEM 8    Collection  Time: 10/17/24  9:32 PM    Specimen: Blood   Result Value Ref Range    Glucose 101 70 - 130 mg/dL    BUN 12 8 - 26 mg/dL    Creatinine 1.00 0.60 - 1.30 mg/dL    Sodium 137 (L) 138 - 146 mmol/L    POC Potassium 3.9 3.5 - 4.9 mmol/L    Chloride 100 98 - 109 mmol/L    Total CO2 21 (L) 24 - 29 mmol/L    Hemoglobin 7.1 (C) 12.0 - 17.0 g/dL    Hematocrit 21 (L) 38 - 51 %    Ionized Calcium 1.17 (L) 1.20 - 1.32 mmol/L    eGFR 56.7 (L) >60.0 mL/min/1.73     Note: In addition to lab results from this visit, the labs listed above may include labs taken at another facility or during a different encounter within the last 24 hours. Please correlate lab times with ED admission and discharge times for further clarification of the services performed during this visit.    CT Angiogram Head w AI Analysis of LVO   Final Result   There is diminished flow noted within the distal left anterior cerebral artery branches which likely supply a small area of diminished perfusion within the left frontal periventricular white matter. No definite infarct is appreciated however.               Electronically Signed: Davdi Heaton MD     10/17/2024 10:08 PM EDT     Workstation ID: [id number]      CT Angiogram Neck   Final Result   There is diminished flow noted within the distal left anterior cerebral artery branches which likely supply a small area of diminished perfusion within the left frontal periventricular white matter. No definite infarct is appreciated however.               Electronically Signed: David Heaton MD     10/17/2024 10:08 PM EDT     Workstation ID: [id number]      CT CEREBRAL PERFUSION WITH & WITHOUT CONTRAST   Final Result   There is diminished flow noted within the distal left anterior cerebral artery branches which likely supply a small area of diminished perfusion within the left frontal periventricular white matter. No definite infarct is appreciated however.               Electronically Signed: David Heaton MD     10/17/2024  10:08 PM EDT     Workstation ID: [id number]      CT Head Without Contrast   Final Result   Impression:   No acute intracranial abnormality. Chronic findings as above.            Electronically Signed: Nish Bangura MD     10/17/2024 9:49 PM EDT     Workstation ID: [id number]        Vitals:    10/17/24 2305 10/17/24 2316 10/17/24 2332 10/18/24 0034   BP: 107/66 112/65 114/65    BP Location: Right arm Right arm Right arm    Patient Position: Lying Lying Lying    Pulse: 102 104 102    Resp: 18 14 20    Temp: 98.1 °F (36.7 °C)      TempSrc: Axillary      SpO2: 98% 100% 100%    Weight:    69.9 kg (154 lb)   Height:         Medications   sodium chloride 0.9 % flush 10 mL (has no administration in time range)   sodium chloride 0.9 % flush 10 mL (has no administration in time range)   sodium chloride 0.9 % flush 10 mL (10 mL Intravenous Given 10/18/24 0129)   sodium chloride 0.9 % flush 10 mL (has no administration in time range)   acetaminophen (TYLENOL) tablet 650 mg (has no administration in time range)     Or   acetaminophen (TYLENOL) 160 MG/5ML oral solution 650 mg (has no administration in time range)     Or   acetaminophen (TYLENOL) suppository 650 mg (has no administration in time range)   sennosides-docusate (PERICOLACE) 8.6-50 MG per tablet 2 tablet (has no administration in time range)     And   polyethylene glycol (MIRALAX) packet 17 g (has no administration in time range)     And   bisacodyl (DULCOLAX) EC tablet 5 mg (has no administration in time range)     And   bisacodyl (DULCOLAX) suppository 10 mg (has no administration in time range)   levETIRAcetam (KEPPRA) injection 500 mg (has no administration in time range)   levETIRAcetam (KEPPRA) injection 1,500 mg (1,500 mg Intravenous Given 10/17/24 2119)   lactated ringers bolus 1,000 mL (0 mL Intravenous Stopped 10/17/24 2332)   midazolam (VERSED) injection 2 mg (2 mg Intravenous Given 10/17/24 2130)   midazolam (VERSED) injection 2 mg (2 mg Intravenous Given  10/17/24 2151)   lacosamide (VIMPAT) injection 200 mg (200 mg Intravenous Given 10/17/24 2201)   iopamidol (ISOVUE-370) 76 % injection 120 mL (120 mL Intravenous Given 10/17/24 2157)     ECG/EMG Results (last 24 hours)       Procedure Component Value Units Date/Time    ECG 12 Lead ED Triage Standing Order; Weak / Dizzy / AMS [[id number]] Collected: 10/17/24 2123     Updated: 10/17/24 2123     QT Interval 322 ms      QTC Interval 457 ms     Narrative:      Test Reason : ED Triage Standing Order~  Blood Pressure :   */*   mmHG  Vent. Rate : 121 BPM     Atrial Rate : 121 BPM     P-R Int : 138 ms          QRS Dur :  76 ms      QT Int : 322 ms       P-R-T Axes :  47 -47  54 degrees     QTc Int : 457 ms    Sinus tachycardia with occasional premature ventricular complexes  Left anterior fascicular block  Possible Anterior infarct (cited on or before 06-JUN-2024)  Abnormal ECG  When compared with ECG of 06-JUN-2024 09:03,  premature ventricular complexes are now present    Referred By: EDMD           Confirmed By:           ECG 12 Lead ED Triage Standing Order; Weak / Dizzy / AMS   Preliminary Result   Test Reason : ED Triage Standing Order~   Blood Pressure :   */*   mmHG   Vent. Rate : 121 BPM     Atrial Rate : 121 BPM      P-R Int : 138 ms          QRS Dur :  76 ms       QT Int : 322 ms       P-R-T Axes :  47 -47  54 degrees      QTc Int : 457 ms      Sinus tachycardia with occasional premature ventricular complexes   Left anterior fascicular block   Possible Anterior infarct (cited on or before 06-JUN-2024)   Abnormal ECG   When compared with ECG of 06-JUN-2024 09:03,   premature ventricular complexes are now present      Referred By: EDMD           Confirmed By:       ECG 12 Lead Altered Mental Status    (Results Pending)           No follow-up provider specified.       Medication List      No changes were made to your prescriptions during this visit.            Seven Ferrell MD  10/18/24 4643

## 2024-10-18 NOTE — PLAN OF CARE
Problem: Adult Inpatient Plan of Care  Goal: Plan of Care Review  Outcome: Progressing  Flowsheets (Taken 10/18/2024 0552)  Progress: no change  Plan of Care Reviewed With: patient  Goal: Patient-Specific Goal (Individualized)  Outcome: Progressing  Goal: Absence of Hospital-Acquired Illness or Injury  Outcome: Progressing  Intervention: Identify and Manage Fall Risk  Recent Flowsheet Documentation  Taken 10/18/2024 0551 by James Álvarez RN  Safety Promotion/Fall Prevention:   safety round/check completed   activity supervised  Taken 10/18/2024 0357 by James Álvarez RN  Safety Promotion/Fall Prevention:   safety round/check completed   activity supervised  Taken 10/18/2024 0201 by James Álvarez RN  Safety Promotion/Fall Prevention:   safety round/check completed   activity supervised  Taken 10/18/2024 0014 by James Álvarez RN  Safety Promotion/Fall Prevention:   safety round/check completed   activity supervised  Intervention: Prevent Skin Injury  Recent Flowsheet Documentation  Taken 10/18/2024 0551 by James Álvarez RN  Body Position:   turned   right  Skin Protection: transparent dressing maintained  Taken 10/18/2024 0357 by James Álvarez RN  Body Position:   turned   left  Skin Protection: transparent dressing maintained  Taken 10/18/2024 0201 by James Álvarez RN  Body Position:   turned   right  Skin Protection: transparent dressing maintained  Taken 10/18/2024 0014 by James Álvarez RN  Body Position:   turned   left  Skin Protection: transparent dressing maintained  Intervention: Prevent and Manage VTE (Venous Thromboembolism) Risk  Recent Flowsheet Documentation  Taken 10/18/2024 0551 by James Álvarez RN  VTE Prevention/Management: SCDs (sequential compression devices) on  Taken 10/18/2024 0357 by James Álvarez RN  VTE Prevention/Management: SCDs (sequential compression devices) on  Taken 10/18/2024 0201 by James Álvarez RN  VTE  Prevention/Management: SCDs (sequential compression devices) on  Taken 10/18/2024 0014 by James Álvarez RN  VTE Prevention/Management: SCDs (sequential compression devices) on  Intervention: Prevent Infection  Recent Flowsheet Documentation  Taken 10/18/2024 0551 by James Álvarez RN  Infection Prevention: environmental surveillance performed  Taken 10/18/2024 0357 by James Álvarez RN  Infection Prevention: environmental surveillance performed  Taken 10/18/2024 0201 by James Álvarez RN  Infection Prevention: environmental surveillance performed  Taken 10/18/2024 0014 by James Álvarez RN  Infection Prevention: environmental surveillance performed  Goal: Optimal Comfort and Wellbeing  Outcome: Progressing  Intervention: Provide Person-Centered Care  Recent Flowsheet Documentation  Taken 10/18/2024 0551 by James Álvarez RN  Trust Relationship/Rapport: care explained  Taken 10/18/2024 0357 by James Álvarez RN  Trust Relationship/Rapport: care explained  Taken 10/18/2024 0201 by James Álvarez RN  Trust Relationship/Rapport: care explained  Taken 10/18/2024 0014 by James Álvarez RN  Trust Relationship/Rapport: care explained  Goal: Readiness for Transition of Care  Outcome: Progressing     Problem: Fall Injury Risk  Goal: Absence of Fall and Fall-Related Injury  Outcome: Progressing  Intervention: Identify and Manage Contributors  Recent Flowsheet Documentation  Taken 10/18/2024 0551 by James Álvarez RN  Medication Review/Management: medications reviewed  Taken 10/18/2024 0357 by James Álvarez RN  Medication Review/Management: medications reviewed  Taken 10/18/2024 0201 by James Álvarez RN  Medication Review/Management: medications reviewed  Taken 10/18/2024 0014 by James Álvarez RN  Medication Review/Management: medications reviewed  Intervention: Promote Injury-Free Environment  Recent Flowsheet Documentation  Taken 10/18/2024 0551  by Moody Burns, Ray L, RN  Safety Promotion/Fall Prevention:   safety round/check completed   activity supervised  Taken 10/18/2024 0357 by James Álvarez, RN  Safety Promotion/Fall Prevention:   safety round/check completed   activity supervised  Taken 10/18/2024 0201 by James Álvarez RN  Safety Promotion/Fall Prevention:   safety round/check completed   activity supervised  Taken 10/18/2024 0014 by James Álvarez, RN  Safety Promotion/Fall Prevention:   safety round/check completed   activity supervised     Problem: Skin Injury Risk Increased  Goal: Skin Health and Integrity  Outcome: Progressing  Intervention: Optimize Skin Protection  Recent Flowsheet Documentation  Taken 10/18/2024 0551 by James Álvarez, RN  Activity Management: activity encouraged  Pressure Reduction Techniques: frequent weight shift encouraged  Head of Bed (HOB) Positioning: HOB elevated  Pressure Reduction Devices: pressure-redistributing mattress utilized  Skin Protection: transparent dressing maintained  Taken 10/18/2024 0357 by James Álvarez, RN  Activity Management: activity encouraged  Pressure Reduction Techniques: frequent weight shift encouraged  Head of Bed (HOB) Positioning: HOB elevated  Pressure Reduction Devices: pressure-redistributing mattress utilized  Skin Protection: transparent dressing maintained  Taken 10/18/2024 0201 by James Álvarez, RN  Activity Management: activity encouraged  Pressure Reduction Techniques: frequent weight shift encouraged  Head of Bed (HOB) Positioning: HOB elevated  Pressure Reduction Devices: pressure-redistributing mattress utilized  Skin Protection: transparent dressing maintained  Taken 10/18/2024 0014 by James Álvarez, RN  Activity Management: activity encouraged  Pressure Reduction Techniques: frequent weight shift encouraged  Head of Bed (HOB) Positioning: HOB elevated  Pressure Reduction Devices: pressure-redistributing mattress utilized  Skin  Protection: transparent dressing maintained   Goal Outcome Evaluation:  Plan of Care Reviewed With: patient        Progress: no change

## 2024-10-18 NOTE — CASE MANAGEMENT/SOCIAL WORK
Continued Stay Note  [facility]     Patient Name: Mónica Carrillo  MRN: [id number]  Today's Date: 10/18/2024    Admit Date: 10/17/2024    Plan: TBD   Discharge Plan       Row Name 10/18/24 8658       Plan    Plan TBD    Patient/Family in Agreement with Plan yes    Plan Comments Discussed Ms. Carrillo in MDR. Per hospice note, hospice team will make a visit tomorrow to assess if patient is appropriate for inpatient hospice. CM will continue to follow plan of care.      Row Name 10/18/24 7980       Plan    Plan Inpatient hospice to assess tomorrow    Plan Comments Hospice referral received, chart reviewed. Visit made to pt, pt's two sons, a daughter and granddaughter present. Pt with eyes closed, no response during the visit. Pt brought to the ED last evening with seizure activities. Family stated pt has previously had a stroke. Pt has been living in a long term care facility for the past 8 years, family reports since May of this year pt has declined and has not wanted to eat or drink. Pt with oxygen on, noted pt's breathing to be slightly labored during the visit. Teaching done on inpatient hospice vs outpatient hospice. At this time pt is limited support and has not received any prn medications for symptom management therefore pt does not meet criteria at this time for inpatient hospice.  Family stated the goal of care is comfort and is requesting pt to be changed to comfort measures instead of limited support. Dr. Tillman made a visit and was informed that the family has opted for comfort measures. Dr. Tillman stated will order morphine prn for any pain or shortness of breath pt may experience. Informed family that the inpatient hospice team will make a visit tomorrow to assess if pt is appropriate for inpatient hospice. Family agreeable to the plan. Will continue to follow. Please call [phone] if can be of further assistance.                   Discharge Codes    No documentation.                        Sherita Grewal, RN
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
## 2024-10-18 NOTE — DISCHARGE PLACEMENT REQUEST
"Henry Walls (79 y.o. Female)       Date of Birth   1945    Social Security Number       Address   [address] [facility] [address]    Home Phone   [phone]    MRN   [id number]       Rastafari   Scientologist    Marital Status                               Admission Date   10/17/24    Admission Type   Emergency    Admitting Provider   Kerrie Tillman DO    Attending Provider   Kerrie Tillman DO    Department, Room/Bed   [facility] 3F, S320/1       Discharge Date       Discharge Disposition       Discharge Destination                                 Attending Provider: Kerrie Tillman DO    Allergies: No Known Allergies    Isolation: None   Infection: None   Code Status: No CPR    Ht: 172.7 cm (68\")   Wt: 69.9 kg (154 lb)    Admission Cmt: None   Principal Problem: Status epilepticus [G40.901]                   Active Insurance as of 10/17/2024       Primary Coverage       Payor Plan Insurance Group Employer/Plan Group    MEDICARE MEDICARE A & B        Payor Plan Address Payor Plan Phone Number Payor Plan Fax Number Effective Dates    [address] [phone]  3/1/2012 - None Entered    [facility] [id number]         Subscriber Name Subscriber Birth Date Member ID       HENRY WALLS 1945 [id number]               Secondary Coverage       Payor Plan Insurance Group Employer/Plan Group    [place]        Payor Plan Address Payor Plan Phone Number Payor Plan Fax Number Effective Dates    [address] [phone]  4/3/2022 - None Entered    [facility] [id number]         Subscriber Name Subscriber Birth Date Member ID       HENRY WALLS 1945 [id number]                     Emergency Contacts        (Rel.) Home Phone Work Phone Mobile Phone    Hollie Wei (Daughter) [phone] -- --    ZachariahTiago (Son) [phone] -- --              Emergency Contact Information       Name Relation Home Work " Mobile    Hollie Wei Daughter [phone]            Other Contacts       Name Relation Home Work Mobile    Tiago Soni Son [phone]            Insurance Information                  MEDICARE/MEDICARE A & B Phone: [phone]    Subscriber: Mónica Carrillo Subscriber#: [id number]    Group#: -- Precert#: --    Authorization#: -- Effective Date: --        [place] Phone: [phone]    Subscriber: Mónica Carrillo Subscriber#: [id number]    Group#: -- Precert#: --    Authorization#: -- Effective Date: --             History & Physical        Hollie AlmeidaPAOLO at 10/17/24 2320       Attestation signed by Molina Yin MD at 10/18/24 0512    I have reviewed this documentation and agree.    Appreciate hospice care today    [facility] for end-of-life care                    [facility] Medicine Services  HISTORY AND PHYSICAL    Patient Name: Mónica Carrillo  : 1945  MRN: [id number]  Primary Care Physician: Molina Domínguez MD  Date of admission: 10/17/2024    Subjective  Subjective     Chief Complaint:  Seizure, no prior history    HPI:  Mónica Carrillo is a 79 y.o. female with significant medical history of severe dementia, currently in resident at [facility], history of iron deficiency anemia, HTN, hypothyroidism who presents to [facility] ED with status epilepticus.       HPI was obtained by medical record and next of kin.  Adult son at bedside states that [facility] contacted him at home on 10/18 at approximately 823 to inform him that his mother was having a seizure.  He elected at that time to have her transported via EMS to [facility] ED for evaluation.  He states that he is unaware of any seizure history for his mother.  Son states that his mother has been bedbound for 8 years, is an advanced stage of dementia, and has a progressive decline in health this year.  He states that his mother's oral intake is very  diminished, sometimes not eating for 4 to 5 days at a time.  Patient is unable to answer review of system and orientation questions, but is resting comfortably in bed.      In ED  Patient received lacosamide, Keppra, and Versed.  1 liter IVF    CT of head  There is diminished flow noted within the distal left anterior cerebral artery branches which likely supply a small area of diminished perfusion within the left frontal periventricular white matter. No definite infarct.     EKG sinus tach with occasional PVC    Review of Systems   Unable to perform ROS: Acuity of condition              Personal History     Past Medical History:   Diagnosis Date    Dementia     Hypertension     Mood disorder     Thyroid disease              History reviewed. No pertinent surgical history.    Family History: family history includes Cardiac arrest in her son; No Known Problems in her father and mother.     Social History:  reports that she has never smoked. She has never been exposed to tobacco smoke. She has never used smokeless tobacco. She reports that she does not drink alcohol and does not use drugs.  Social History     Social History Narrative    Not on file       Medications:  Ergocalciferol, Multiple Vitamins-Minerals, acetaminophen, atorvastatin, docusate sodium, ferrous sulfate, folic acid, levothyroxine, magnesium hydroxide, metoprolol tartrate, mirtazapine, pantoprazole, polyvinyl alcohol, and risperiDONE    No Known Allergies    Objective  Objective     Vital Signs:   Temp:  [98.1 °F (36.7 °C)] 98.1 °F (36.7 °C)  Heart Rate:  [101-126] 102  Resp:  [14-22] 20  BP: (100-114)/(54-75) 114/65  Flow (L/min) (Oxygen Therapy):  [15] 15    Physical Exam  Constitutional:       General: She is sleeping.   HENT:      Head: Normocephalic and atraumatic.   Cardiovascular:      Rate and Rhythm: Regular rhythm. Tachycardia present.   Pulmonary:      Effort: Respiratory distress present.      Comments: Patient requiring a nonrebreather  mask  Abdominal:      General: Abdomen is flat.      Palpations: Abdomen is soft.   Musculoskeletal:      Right lower leg: Edema present.      Left lower leg: Edema present.   Neurological:      Mental Status: She is lethargic and unresponsive.      GCS: GCS eye subscore is 1. GCS verbal subscore is 1. GCS motor subscore is 2.        Result Review:  I have personally reviewed the results from the time of this admission to 10/18/2024 00:55 EDT and agree with these findings:  [x]  Laboratory list / accordion  []  Microbiology  [x]  Radiology  [x]  EKG/Telemetry   []  Cardiology/Vascular   []  Pathology  [x]  Old records  []  Other:      LAB RESULTS:      Lab 10/17/24  2132 10/17/24  2118   WBC  --  10.16   HEMOGLOBIN  --  7.0*   HEMOGLOBIN, POC 7.1*  --    HEMATOCRIT  --  23.6*   HEMATOCRIT POC 21*  --    PLATELETS  --  251   NEUTROS ABS  --  8.41*   IMMATURE GRANS (ABS)  --  0.06*   LYMPHS ABS  --  0.84   MONOS ABS  --  0.74   EOS ABS  --  0.07   MCV  --  80.5   LACTATE  --  6.9*   APTT  --  29.2         Lab 10/17/24  2132 10/17/24  2118   SODIUM  --  138   POTASSIUM  --  5.2   CHLORIDE  --  101   CO2  --  24.0   ANION GAP  --  13.0   BUN  --  15   CREATININE 1.00 1.00   EGFR 56.7* 56.7*   GLUCOSE  --  120*   CALCIUM  --  8.5*   MAGNESIUM  --  1.8         Lab 10/17/24  2118   TOTAL PROTEIN 6.6   ALBUMIN 2.0*   GLOBULIN 4.6   ALT (SGPT) 6   AST (SGOT) 17   BILIRUBIN 0.3   ALK PHOS 98         Lab 10/17/24  2118   HSTROP T 88*                 Brief Urine Lab Results  (Last result in the past 365 days)        Color   Clarity   Blood   Leuk Est   Nitrite   Protein   CREAT   Urine HCG        06/05/24 1502 Yellow   Cloudy   Trace   Large (3+)   Negative   Negative                 Microbiology Results (last 10 days)       ** No results found for the last 240 hours. **            CT Angiogram Head w AI Analysis of LVO    Result Date: 10/17/2024  CT CEREBRAL PERFUSION W WO CONTRAST, CT ANGIOGRAM NECK, CT ANGIOGRAM HEAD W AI  ANALYSIS OF LVO Date of Exam: 10/17/2024 9:42 PM EDT Indication: Neuro Deficit, acute, Stroke suspected Neuro deficit, acute stroke suspected.  Comparison: 7/17/2022 Technique: Axial CT images of the brain were obtained prior to and after the administration of 120 cc Isovue-370 IV contrast . Core blood volume, core blood flow, mean transit time, and Tmax images were obtained utilizing the Rapid software protocol. A limited CT angiogram of the head was also performed to measure the blood vessel density. The radiation dose reduction device was turned on for each scan per the ALARA (As Low as Reasonably Achievable) protocol.  CTA of the head and neck was performed after the uneventful intravenous administration of iodinated contrast.  Reconstructed coronal and sagittal images were also obtained. In addition, a 3-D volume rendered image was created for interpretation. Automated exposure control and iterative reconstruction methods were used.  FINDINGS: There is approximately 24 cc mildly elevated Tmax greater than 4 seconds with patchy involvement of the frontal lobes. No significant elevation of the Tmax is noted. Approximately 4 cc of decreased vertebral blood flow involving the left frontal periventricular white matter. No decreased white blood volume. The arch of the aorta is normal. Vertebral the right common carotid. Mild atheromatous disease of the right carotid bulb and proximal right internal carotid artery. Mild atheromatous disease involving the intracranial segments of the right internal carotid artery. The right carotid terminus is normal. The visualized branches of the right anterior and middle cerebral arteries The left common carotid artery is normal. Mild atheromatous disease of the proximal left internal carotid artery. Mild atheromatous disease involving the intracranial segments of the left internal carotid artery. The left carotid terminus is normal. The left A1 and A2 segments are patent. There is  diminished size and flow within the left A3 branch which maintains tenuous flow. The smaller perforating branches likely supply the region of concern. No vessel occlusion. Both vertebral arteries arise from the subclavian arteries. The left vertebral artery is dominant. Both vertebral arteries are small. Both vertebral arteries supply the basilar artery which is also congenitally small. The basilar artery terminates in tiny bilateral posterior cerebral arteries. The majority of the flow to the posterior cerebral arteries is from the anterior circulation via prominent posterior communicating arteries. No abnormal intracranial enhancement. No intracranial hemorrhage. No intracranial mass. Intracranial venous sinuses are patent. The ventricles and sulci are normal in size and configuration. Orbital and periorbital soft tissues are normal. Complete opacification of the left maxillary sinus with mucoperiosteal reactive changes. The parotid and submandibular glands appear normal. Fibrotic changes at the right upper lobe. No adenopathy.     Impression: There is diminished flow noted within the distal left anterior cerebral artery branches which likely supply a small area of diminished perfusion within the left frontal periventricular white matter. No definite infarct is appreciated however. Electronically Signed: David Heaton MD  10/17/2024 10:08 PM EDT  Workstation ID: [id number]    CT Angiogram Neck    Result Date: 10/17/2024  CT CEREBRAL PERFUSION W WO CONTRAST, CT ANGIOGRAM NECK, CT ANGIOGRAM HEAD W AI ANALYSIS OF LVO Date of Exam: 10/17/2024 9:42 PM EDT Indication: Neuro Deficit, acute, Stroke suspected Neuro deficit, acute stroke suspected.  Comparison: 7/17/2022 Technique: Axial CT images of the brain were obtained prior to and after the administration of 120 cc Isovue-370 IV contrast . Core blood volume, core blood flow, mean transit time, and Tmax images were obtained utilizing the Rapid software protocol. A limited  CT angiogram of the head was also performed to measure the blood vessel density. The radiation dose reduction device was turned on for each scan per the ALARA (As Low as Reasonably Achievable) protocol.  CTA of the head and neck was performed after the uneventful intravenous administration of iodinated contrast.  Reconstructed coronal and sagittal images were also obtained. In addition, a 3-D volume rendered image was created for interpretation. Automated exposure control and iterative reconstruction methods were used.  FINDINGS: There is approximately 24 cc mildly elevated Tmax greater than 4 seconds with patchy involvement of the frontal lobes. No significant elevation of the Tmax is noted. Approximately 4 cc of decreased vertebral blood flow involving the left frontal periventricular white matter. No decreased white blood volume. The arch of the aorta is normal. Vertebral the right common carotid. Mild atheromatous disease of the right carotid bulb and proximal right internal carotid artery. Mild atheromatous disease involving the intracranial segments of the right internal carotid artery. The right carotid terminus is normal. The visualized branches of the right anterior and middle cerebral arteries The left common carotid artery is normal. Mild atheromatous disease of the proximal left internal carotid artery. Mild atheromatous disease involving the intracranial segments of the left internal carotid artery. The left carotid terminus is normal. The left A1 and A2 segments are patent. There is diminished size and flow within the left A3 branch which maintains tenuous flow. The smaller perforating branches likely supply the region of concern. No vessel occlusion. Both vertebral arteries arise from the subclavian arteries. The left vertebral artery is dominant. Both vertebral arteries are small. Both vertebral arteries supply the basilar artery which is also congenitally small. The basilar artery terminates in tiny  bilateral posterior cerebral arteries. The majority of the flow to the posterior cerebral arteries is from the anterior circulation via prominent posterior communicating arteries. No abnormal intracranial enhancement. No intracranial hemorrhage. No intracranial mass. Intracranial venous sinuses are patent. The ventricles and sulci are normal in size and configuration. Orbital and periorbital soft tissues are normal. Complete opacification of the left maxillary sinus with mucoperiosteal reactive changes. The parotid and submandibular glands appear normal. Fibrotic changes at the right upper lobe. No adenopathy.     Impression: There is diminished flow noted within the distal left anterior cerebral artery branches which likely supply a small area of diminished perfusion within the left frontal periventricular white matter. No definite infarct is appreciated however. Electronically Signed: David Heaton MD  10/17/2024 10:08 PM EDT  Workstation ID: [id number]    CT CEREBRAL PERFUSION WITH & WITHOUT CONTRAST    Result Date: 10/17/2024  CT CEREBRAL PERFUSION W WO CONTRAST, CT ANGIOGRAM NECK, CT ANGIOGRAM HEAD W AI ANALYSIS OF LVO Date of Exam: 10/17/2024 9:42 PM EDT Indication: Neuro Deficit, acute, Stroke suspected Neuro deficit, acute stroke suspected.  Comparison: 7/17/2022 Technique: Axial CT images of the brain were obtained prior to and after the administration of 120 cc Isovue-370 IV contrast . Core blood volume, core blood flow, mean transit time, and Tmax images were obtained utilizing the Rapid software protocol. A limited CT angiogram of the head was also performed to measure the blood vessel density. The radiation dose reduction device was turned on for each scan per the ALARA (As Low as Reasonably Achievable) protocol.  CTA of the head and neck was performed after the uneventful intravenous administration of iodinated contrast.  Reconstructed coronal and sagittal images were also obtained. In addition, a 3-D  volume rendered image was created for interpretation. Automated exposure control and iterative reconstruction methods were used.  FINDINGS: There is approximately 24 cc mildly elevated Tmax greater than 4 seconds with patchy involvement of the frontal lobes. No significant elevation of the Tmax is noted. Approximately 4 cc of decreased vertebral blood flow involving the left frontal periventricular white matter. No decreased white blood volume. The arch of the aorta is normal. Vertebral the right common carotid. Mild atheromatous disease of the right carotid bulb and proximal right internal carotid artery. Mild atheromatous disease involving the intracranial segments of the right internal carotid artery. The right carotid terminus is normal. The visualized branches of the right anterior and middle cerebral arteries The left common carotid artery is normal. Mild atheromatous disease of the proximal left internal carotid artery. Mild atheromatous disease involving the intracranial segments of the left internal carotid artery. The left carotid terminus is normal. The left A1 and A2 segments are patent. There is diminished size and flow within the left A3 branch which maintains tenuous flow. The smaller perforating branches likely supply the region of concern. No vessel occlusion. Both vertebral arteries arise from the subclavian arteries. The left vertebral artery is dominant. Both vertebral arteries are small. Both vertebral arteries supply the basilar artery which is also congenitally small. The basilar artery terminates in tiny bilateral posterior cerebral arteries. The majority of the flow to the posterior cerebral arteries is from the anterior circulation via prominent posterior communicating arteries. No abnormal intracranial enhancement. No intracranial hemorrhage. No intracranial mass. Intracranial venous sinuses are patent. The ventricles and sulci are normal in size and configuration. Orbital and periorbital  soft tissues are normal. Complete opacification of the left maxillary sinus with mucoperiosteal reactive changes. The parotid and submandibular glands appear normal. Fibrotic changes at the right upper lobe. No adenopathy.     Impression: There is diminished flow noted within the distal left anterior cerebral artery branches which likely supply a small area of diminished perfusion within the left frontal periventricular white matter. No definite infarct is appreciated however. Electronically Signed: David Heaton MD  10/17/2024 10:08 PM EDT  Workstation ID: [id number]    CT Head Without Contrast    Result Date: 10/17/2024  CT HEAD WO CONTRAST Date of Exam: 10/17/2024 9:32 PM EDT Indication: seizure, ams. Comparison: 7/17/2022 Technique: Axial CT images were obtained of the head without contrast administration.  Automated exposure control and iterative construction methods were used. Findings: There is no evidence of acute intracranial hemorrhage, mass, midline shift, new loss of gray-white matter differentiation, or extra-axial fluid collection. There is subcortical and periventricular white matter hypodensity, asymmetrically increased in the  left frontal lobe, similar to prior exam. This may reflect chronic small vessel ischemic disease. There is global parenchymal volume loss with ex vacuo dilation of the ventricular system. Basal cisterns are patent. There is no evidence of acute fracture. Near complete opacification of the left maxillary sinus. Frothy secretions in the left sphenoid sinus. Mastoid air cells are clear. Orbits and included soft tissues show no acute abnormality..     Impression: Impression: No acute intracranial abnormality. Chronic findings as above. Electronically Signed: Nish Bangura MD  10/17/2024 9:49 PM EDT  Workstation ID: [id number]         Assessment & Plan  Assessment & Plan       Status epilepticus    Anemia    Essential hypertension    Hypothyroidism    Hyperlipidemia    Dementia    Acute  respiratory failure with hypoxia    Somnambulance    Mónica Carrillo is a 79 y.o. female with significant medical history of severe dementia, currently in resident at [facility], history of iron deficiency anemia, HTN, hypothyroidism who presents to [facility] ED with status epilepticus.     Somnambulance  Status  epilepticus  Respiratory failure with hypoxia  -No history of seizure  -Given Versed, lactamide, and Keppra in ED  -GCS 4  -Seizure precautions  -Pain management and prevent seizures  -Continue Keppra  -Consult hospice, family interested in pursuing comfort measures only  -Consider spiritual consult    Alzheimer's dementia, severe  -Hold Remeron and risperidone due to decreased LOC    Hypothyroidism  -Hold Synthroid    Hypertension  -BP stable   -Hold metoprolol    Anemia  -Hemoglobin 7  -Family refuses blood products      **Patient in pain due to decreased level of responsiveness.  Holding all meds at this time.         DVT prophylaxis: Comfort care only     CODE STATUS: DNR/DNI  Medical Intervention Limits: No intubation (DNI); No antiarrhythmic drugs; No blood products; No cardioversion; No dialysis; No vasopressors; No artificial nutrition; No NIPPV (Non-Invasive Positive Pressure Ventilation)  Level Of Support Discussed With: Next of Kin (If No Surrogate)  Code Status (Patient has no pulse and is not breathing): No CPR (Do Not Attempt to Resuscitate)  Medical Interventions (Patient has pulse or is breathing): Limited Support      Expected Discharge  Expected discharge date/ time has not been documented.      This note has been completed as part of a split-shared workflow.     Signature: Electronically signed by PAOLO Lowe, 10/18/24, 12:32 AM EDT                Electronically signed by Molina Yin MD at 10/18/24 0595

## 2024-10-19 VITALS
DIASTOLIC BLOOD PRESSURE: 77 MMHG | RESPIRATION RATE: 18 BRPM | OXYGEN SATURATION: 100 % | HEIGHT: 68 IN | TEMPERATURE: 99.3 F | SYSTOLIC BLOOD PRESSURE: 101 MMHG | BODY MASS INDEX: 23.34 KG/M2 | HEART RATE: 133 BPM | WEIGHT: 154 LBS

## 2024-10-19 PROBLEM — G30.9 ALZHEIMER'S DEMENTIA: Status: ACTIVE | Noted: 2024-10-19

## 2024-10-19 PROBLEM — F02.80 ALZHEIMER'S DEMENTIA: Status: ACTIVE | Noted: 2024-01-01

## 2024-10-19 PROCEDURE — 25010000002 GLYCOPYRROLATE 0.2 MG/ML SOLUTION: Performed by: STUDENT IN AN ORGANIZED HEALTH CARE EDUCATION/TRAINING PROGRAM

## 2024-10-19 PROCEDURE — 25010000002 MIDAZOLAM PER 1 MG: Performed by: STUDENT IN AN ORGANIZED HEALTH CARE EDUCATION/TRAINING PROGRAM

## 2024-10-19 PROCEDURE — 25010000002 MORPHINE PER 10 MG: Performed by: STUDENT IN AN ORGANIZED HEALTH CARE EDUCATION/TRAINING PROGRAM

## 2024-10-19 PROCEDURE — 25010000002 LEVETRIRACETAM PER 10 MG

## 2024-10-19 RX ORDER — LEVETIRACETAM 500 MG/5ML
500 INJECTION, SOLUTION, CONCENTRATE INTRAVENOUS EVERY 12 HOURS SCHEDULED
Status: CANCELLED | OUTPATIENT
Start: 2024-10-19

## 2024-10-19 RX ORDER — GLYCOPYRROLATE 0.2 MG/ML
0.4 INJECTION INTRAMUSCULAR; INTRAVENOUS EVERY 6 HOURS SCHEDULED
Status: CANCELLED | OUTPATIENT
Start: 2024-10-19

## 2024-10-19 RX ORDER — SODIUM CHLORIDE 0.9 % (FLUSH) 0.9 %
10 SYRINGE (ML) INJECTION EVERY 12 HOURS SCHEDULED
Status: CANCELLED | OUTPATIENT
Start: 2024-10-19

## 2024-10-19 RX ORDER — SODIUM CHLORIDE 0.9 % (FLUSH) 0.9 %
10 SYRINGE (ML) INJECTION AS NEEDED
Status: CANCELLED | OUTPATIENT
Start: 2024-10-19

## 2024-10-19 RX ADMIN — Medication 10 ML: at 08:08

## 2024-10-19 RX ADMIN — GLYCOPYRROLATE 0.4 MG: 0.2 INJECTION INTRAMUSCULAR; INTRAVENOUS at 05:17

## 2024-10-19 RX ADMIN — GLYCOPYRROLATE 0.4 MG: 0.2 INJECTION INTRAMUSCULAR; INTRAVENOUS at 11:37

## 2024-10-19 RX ADMIN — LEVETIRACETAM 500 MG: 100 INJECTION, SOLUTION INTRAVENOUS at 08:08

## 2024-10-19 RX ADMIN — MORPHINE SULFATE 2 MG: 2 INJECTION, SOLUTION INTRAMUSCULAR; INTRAVENOUS at 11:38

## 2024-10-19 RX ADMIN — MIDAZOLAM HYDROCHLORIDE 1 MG: 1 INJECTION, SOLUTION INTRAMUSCULAR; INTRAVENOUS at 11:38

## 2024-10-19 NOTE — DISCHARGE SUMMARY
[facility] Medicine Services  DISCHARGE SUMMARY    Patient Name: Mónica Carrillo  : 1945  MRN: [id number]    Date of Admission: 10/17/2024  9:11 PM  Date of Discharge: 10/19/2024  Primary Care Physician: Molina Domínguez MD    Consults       No orders found from 2024 to 10/18/2024.            Hospital Course     Presenting Problem:     Active Hospital Problems    Diagnosis  POA    **Status epilepticus [G40.901]  Yes    Acute respiratory failure with hypoxia [J96.01]  Unknown    Somnambulance [F51.3]  Unknown    Essential hypertension [I10]  Yes    Hypothyroidism [E03.9]  Yes    Hyperlipidemia [E78.5]  Yes    Dementia [F03.90]  Yes    Anemia [D64.9]  Yes      Resolved Hospital Problems   No resolved problems to display.          Hospital Course:  Mónica Carrillo is a 79 y.o. female with significant medical history of severe dementia, currently in resident at [facility], history of iron deficiency anemia, HTN, hypothyroidism, CVA who presented to [facility] ED with seizure-like activity and found to be in status epilepticus.      Copied text in this note has been reviewed and is accurate as of 10/19/2024.  Patient is new to me today.        Status epilepticus  Goals of care  -Patient found to be in status epilepticus on arrival to the ER, Stroke Neurology evaluated. Started on Keppra 500 mg twice daily.  -Ultimately patient's family opted for comfort measures.  -Continue comfort care measures. Palliative and hospice consulted  - Has been evaluating for inpatient eligibility.     Poor oral intake  -worsening for months, per family  -No plan for feeding tube or enteral nutrition.      Discharge Follow Up Recommendations for outpatient labs/diagnostics:  Patient was discharged to inpatient hospice.    Day of Discharge           Vital Signs:   Temp:  [98.1 °F (36.7 °C)-99.3 °F (37.4 °C)] 99.3 °F (37.4 °C)  Heart Rate:  [112-133] 133  Resp:  [18-20] 18  BP:  ()/(53-77) 101/77  Flow (L/min) (Oxygen Therapy):  [2] 2      Physical Exam: Sleeping this morning.  Hospice team at bedside.      Pertinent  and/or Most Recent Results     LAB RESULTS:      Lab 10/17/24  2132 10/17/24  2118   WBC  --  10.16   HEMOGLOBIN  --  7.0*   HEMOGLOBIN, POC 7.1*  --    HEMATOCRIT  --  23.6*   HEMATOCRIT POC 21*  --    PLATELETS  --  251   NEUTROS ABS  --  8.41*   IMMATURE GRANS (ABS)  --  0.06*   LYMPHS ABS  --  0.84   MONOS ABS  --  0.74   EOS ABS  --  0.07   MCV  --  80.5   LACTATE  --  6.9*   APTT  --  29.2         Lab 10/17/24  2132 10/17/24  2118   SODIUM  --  138   POTASSIUM  --  5.2   CHLORIDE  --  101   CO2  --  24.0   ANION GAP  --  13.0   BUN  --  15   CREATININE 1.00 1.00   EGFR 56.7* 56.7*   GLUCOSE  --  120*   CALCIUM  --  8.5*   MAGNESIUM  --  1.8         Lab 10/17/24  2118   TOTAL PROTEIN 6.6   ALBUMIN 2.0*   GLOBULIN 4.6   ALT (SGPT) 6   AST (SGOT) 17   BILIRUBIN 0.3   ALK PHOS 98         Lab 10/17/24  2118   HSTROP T 88*                 Brief Urine Lab Results  (Last result in the past 365 days)        Color   Clarity   Blood   Leuk Est   Nitrite   Protein   CREAT   Urine HCG        06/05/24 1502 Yellow   Cloudy   Trace   Large (3+)   Negative   Negative                 Microbiology Results (last 10 days)       ** No results found for the last 240 hours. **            CT Angiogram Head w AI Analysis of LVO    Result Date: 10/17/2024  CT CEREBRAL PERFUSION W WO CONTRAST, CT ANGIOGRAM NECK, CT ANGIOGRAM HEAD W AI ANALYSIS OF LVO Date of Exam: 10/17/2024 9:42 PM EDT Indication: Neuro Deficit, acute, Stroke suspected Neuro deficit, acute stroke suspected.  Comparison: 7/17/2022 Technique: Axial CT images of the brain were obtained prior to and after the administration of 120 cc Isovue-370 IV contrast . Core blood volume, core blood flow, mean transit time, and Tmax images were obtained utilizing the Rapid software protocol. A limited CT angiogram of the head was also  performed to measure the blood vessel density. The radiation dose reduction device was turned on for each scan per the ALARA (As Low as Reasonably Achievable) protocol.  CTA of the head and neck was performed after the uneventful intravenous administration of iodinated contrast.  Reconstructed coronal and sagittal images were also obtained. In addition, a 3-D volume rendered image was created for interpretation. Automated exposure control and iterative reconstruction methods were used.  FINDINGS: There is approximately 24 cc mildly elevated Tmax greater than 4 seconds with patchy involvement of the frontal lobes. No significant elevation of the Tmax is noted. Approximately 4 cc of decreased vertebral blood flow involving the left frontal periventricular white matter. No decreased white blood volume. The arch of the aorta is normal. Vertebral the right common carotid. Mild atheromatous disease of the right carotid bulb and proximal right internal carotid artery. Mild atheromatous disease involving the intracranial segments of the right internal carotid artery. The right carotid terminus is normal. The visualized branches of the right anterior and middle cerebral arteries The left common carotid artery is normal. Mild atheromatous disease of the proximal left internal carotid artery. Mild atheromatous disease involving the intracranial segments of the left internal carotid artery. The left carotid terminus is normal. The left A1 and A2 segments are patent. There is diminished size and flow within the left A3 branch which maintains tenuous flow. The smaller perforating branches likely supply the region of concern. No vessel occlusion. Both vertebral arteries arise from the subclavian arteries. The left vertebral artery is dominant. Both vertebral arteries are small. Both vertebral arteries supply the basilar artery which is also congenitally small. The basilar artery terminates in tiny bilateral posterior cerebral  arteries. The majority of the flow to the posterior cerebral arteries is from the anterior circulation via prominent posterior communicating arteries. No abnormal intracranial enhancement. No intracranial hemorrhage. No intracranial mass. Intracranial venous sinuses are patent. The ventricles and sulci are normal in size and configuration. Orbital and periorbital soft tissues are normal. Complete opacification of the left maxillary sinus with mucoperiosteal reactive changes. The parotid and submandibular glands appear normal. Fibrotic changes at the right upper lobe. No adenopathy.     There is diminished flow noted within the distal left anterior cerebral artery branches which likely supply a small area of diminished perfusion within the left frontal periventricular white matter. No definite infarct is appreciated however. Electronically Signed: David Heaton MD  10/17/2024 10:08 PM EDT  Workstation ID: [id number]    CT Angiogram Neck    Result Date: 10/17/2024  CT CEREBRAL PERFUSION W WO CONTRAST, CT ANGIOGRAM NECK, CT ANGIOGRAM HEAD W AI ANALYSIS OF LVO Date of Exam: 10/17/2024 9:42 PM EDT Indication: Neuro Deficit, acute, Stroke suspected Neuro deficit, acute stroke suspected.  Comparison: 7/17/2022 Technique: Axial CT images of the brain were obtained prior to and after the administration of 120 cc Isovue-370 IV contrast . Core blood volume, core blood flow, mean transit time, and Tmax images were obtained utilizing the Rapid software protocol. A limited CT angiogram of the head was also performed to measure the blood vessel density. The radiation dose reduction device was turned on for each scan per the ALARA (As Low as Reasonably Achievable) protocol.  CTA of the head and neck was performed after the uneventful intravenous administration of iodinated contrast.  Reconstructed coronal and sagittal images were also obtained. In addition, a 3-D volume rendered image was created for interpretation. Automated exposure  control and iterative reconstruction methods were used.  FINDINGS: There is approximately 24 cc mildly elevated Tmax greater than 4 seconds with patchy involvement of the frontal lobes. No significant elevation of the Tmax is noted. Approximately 4 cc of decreased vertebral blood flow involving the left frontal periventricular white matter. No decreased white blood volume. The arch of the aorta is normal. Vertebral the right common carotid. Mild atheromatous disease of the right carotid bulb and proximal right internal carotid artery. Mild atheromatous disease involving the intracranial segments of the right internal carotid artery. The right carotid terminus is normal. The visualized branches of the right anterior and middle cerebral arteries The left common carotid artery is normal. Mild atheromatous disease of the proximal left internal carotid artery. Mild atheromatous disease involving the intracranial segments of the left internal carotid artery. The left carotid terminus is normal. The left A1 and A2 segments are patent. There is diminished size and flow within the left A3 branch which maintains tenuous flow. The smaller perforating branches likely supply the region of concern. No vessel occlusion. Both vertebral arteries arise from the subclavian arteries. The left vertebral artery is dominant. Both vertebral arteries are small. Both vertebral arteries supply the basilar artery which is also congenitally small. The basilar artery terminates in tiny bilateral posterior cerebral arteries. The majority of the flow to the posterior cerebral arteries is from the anterior circulation via prominent posterior communicating arteries. No abnormal intracranial enhancement. No intracranial hemorrhage. No intracranial mass. Intracranial venous sinuses are patent. The ventricles and sulci are normal in size and configuration. Orbital and periorbital soft tissues are normal. Complete opacification of the left maxillary  sinus with mucoperiosteal reactive changes. The parotid and submandibular glands appear normal. Fibrotic changes at the right upper lobe. No adenopathy.     There is diminished flow noted within the distal left anterior cerebral artery branches which likely supply a small area of diminished perfusion within the left frontal periventricular white matter. No definite infarct is appreciated however. Electronically Signed: David Heaton MD  10/17/2024 10:08 PM EDT  Workstation ID: [id number]    CT CEREBRAL PERFUSION WITH & WITHOUT CONTRAST    Result Date: 10/17/2024  CT CEREBRAL PERFUSION W WO CONTRAST, CT ANGIOGRAM NECK, CT ANGIOGRAM HEAD W AI ANALYSIS OF LVO Date of Exam: 10/17/2024 9:42 PM EDT Indication: Neuro Deficit, acute, Stroke suspected Neuro deficit, acute stroke suspected.  Comparison: 7/17/2022 Technique: Axial CT images of the brain were obtained prior to and after the administration of 120 cc Isovue-370 IV contrast . Core blood volume, core blood flow, mean transit time, and Tmax images were obtained utilizing the Rapid software protocol. A limited CT angiogram of the head was also performed to measure the blood vessel density. The radiation dose reduction device was turned on for each scan per the ALARA (As Low as Reasonably Achievable) protocol.  CTA of the head and neck was performed after the uneventful intravenous administration of iodinated contrast.  Reconstructed coronal and sagittal images were also obtained. In addition, a 3-D volume rendered image was created for interpretation. Automated exposure control and iterative reconstruction methods were used.  FINDINGS: There is approximately 24 cc mildly elevated Tmax greater than 4 seconds with patchy involvement of the frontal lobes. No significant elevation of the Tmax is noted. Approximately 4 cc of decreased vertebral blood flow involving the left frontal periventricular white matter. No decreased white blood volume. The arch of the aorta is  normal. Vertebral the right common carotid. Mild atheromatous disease of the right carotid bulb and proximal right internal carotid artery. Mild atheromatous disease involving the intracranial segments of the right internal carotid artery. The right carotid terminus is normal. The visualized branches of the right anterior and middle cerebral arteries The left common carotid artery is normal. Mild atheromatous disease of the proximal left internal carotid artery. Mild atheromatous disease involving the intracranial segments of the left internal carotid artery. The left carotid terminus is normal. The left A1 and A2 segments are patent. There is diminished size and flow within the left A3 branch which maintains tenuous flow. The smaller perforating branches likely supply the region of concern. No vessel occlusion. Both vertebral arteries arise from the subclavian arteries. The left vertebral artery is dominant. Both vertebral arteries are small. Both vertebral arteries supply the basilar artery which is also congenitally small. The basilar artery terminates in tiny bilateral posterior cerebral arteries. The majority of the flow to the posterior cerebral arteries is from the anterior circulation via prominent posterior communicating arteries. No abnormal intracranial enhancement. No intracranial hemorrhage. No intracranial mass. Intracranial venous sinuses are patent. The ventricles and sulci are normal in size and configuration. Orbital and periorbital soft tissues are normal. Complete opacification of the left maxillary sinus with mucoperiosteal reactive changes. The parotid and submandibular glands appear normal. Fibrotic changes at the right upper lobe. No adenopathy.     There is diminished flow noted within the distal left anterior cerebral artery branches which likely supply a small area of diminished perfusion within the left frontal periventricular white matter. No definite infarct is appreciated however.  Electronically Signed: David Heaton MD  10/17/2024 10:08 PM EDT  Workstation ID: [id number]    CT Head Without Contrast    Result Date: 10/17/2024  CT HEAD WO CONTRAST Date of Exam: 10/17/2024 9:32 PM EDT Indication: seizure, ams. Comparison: 7/17/2022 Technique: Axial CT images were obtained of the head without contrast administration.  Automated exposure control and iterative construction methods were used. Findings: There is no evidence of acute intracranial hemorrhage, mass, midline shift, new loss of gray-white matter differentiation, or extra-axial fluid collection. There is subcortical and periventricular white matter hypodensity, asymmetrically increased in the  left frontal lobe, similar to prior exam. This may reflect chronic small vessel ischemic disease. There is global parenchymal volume loss with ex vacuo dilation of the ventricular system. Basal cisterns are patent. There is no evidence of acute fracture. Near complete opacification of the left maxillary sinus. Frothy secretions in the left sphenoid sinus. Mastoid air cells are clear. Orbits and included soft tissues show no acute abnormality..     Impression: No acute intracranial abnormality. Chronic findings as above. Electronically Signed: Nish Bangura MD  10/17/2024 9:49 PM EDT  Workstation ID: [id number]                 Plan for Follow-up of Pending Labs/Results:     Discharge Details        Discharge Medications        Continue These Medications        Instructions Start Date   acetaminophen 500 MG tablet  Commonly known as: TYLENOL   500 mg, Oral, Every 6 Hours PRN      atorvastatin 40 MG tablet  Commonly known as: LIPITOR   40 mg, Oral, Every Morning      docusate sodium 100 MG capsule  Commonly known as: COLACE   100 mg, Oral, 2 Times Daily      Ergocalciferol 50 MCG (2000 UT) capsule   1 capsule, Oral, Every Morning      ferrous sulfate 325 (65 FE) MG tablet  Commonly known as: FerrouSul   325 mg, Oral, Every Other Day      folic acid 1 MG  tablet  Commonly known as: FOLVITE   1 mg, Oral, Daily      levothyroxine 50 MCG tablet  Commonly known as: SYNTHROID, LEVOTHROID   50 mcg, Oral, Daily      magnesium hydroxide 400 MG/5ML suspension  Commonly known as: MILK OF MAGNESIA   30 mL, Oral, Daily PRN      metoprolol tartrate 50 MG tablet  Commonly known as: LOPRESSOR   50 mg, Oral, 2 Times Daily      mirtazapine 7.5 MG tablet  Commonly known as: REMERON   7.5 mg, Oral, Nightly      MULTIVITAMIN & MINERAL PO   1 tablet, Oral, Every Morning      pantoprazole 40 MG EC tablet  Commonly known as: PROTONIX   40 mg, Oral, 2 Times Daily Before Meals      polyvinyl alcohol 1.4 % ophthalmic solution  Commonly known as: LIQUIFILM   2 drops, Both Eyes, Every 1 Hour PRN      risperiDONE 0.25 MG tablet  Commonly known as: risperDAL   0.25 mg, Oral, 2 Times Daily               No Known Allergies      Discharge Disposition:  Hospice/Medical Facility ([facility])    Diet:  Hospital:No active diet order                CODE STATUS:    Code Status and Medical Interventions: No CPR (Do Not Attempt to Resuscitate); Comfort Measures   Ordered at: 10/18/24 1217     Code Status (Patient has no pulse and is not breathing):    No CPR (Do Not Attempt to Resuscitate)     Medical Interventions (Patient has pulse or is breathing):    Comfort Measures       No future appointments.              Adalberto Tristan MD  10/19/24      Time Spent on Discharge:  I spent  25  minutes on this discharge activity which included: face-to-face encounter with the patient, reviewing the data in the system, coordination of the care with the nursing staff as well as consultants, documentation, and entering orders.

## 2024-10-19 NOTE — PLAN OF CARE
Problem: Adult Inpatient Plan of Care  Goal: Plan of Care Review  Outcome: Progressing  Goal: Patient-Specific Goal (Individualized)  Outcome: Progressing  Goal: Absence of Hospital-Acquired Illness or Injury  Outcome: Progressing  Intervention: Identify and Manage Fall Risk  Intervention: Prevent Skin Injury  Intervention: Prevent and Manage VTE (Venous Thromboembolism) Risk  Intervention: Prevent Infection  Goal: Optimal Comfort and Wellbeing  Outcome: Progressing  Intervention: Provide Person-Centered Care  Goal: Readiness for Transition of Care  Outcome: Progressing     Problem: Fall Injury Risk  Goal: Absence of Fall and Fall-Related Injury  Outcome: Progressing  Intervention: Identify and Manage Contributors  Intervention: Promote Injury-Free Environment     Problem: Skin Injury Risk Increased  Goal: Skin Health and Integrity  Outcome: Progressing  Intervention: Optimize Skin Protection   Goal Outcome Evaluation:  Plan of Care Reviewed With: patient

## 2024-10-19 NOTE — CONSULTS
Hospice History and Physical     Patient Name:  Mónica Carrillo   : 1945   Sex: female    Patient Care Team:  Molina Domínguez MD as PCP - General (Family Medicine)    Code Status: DNR Comfort Care    Subjective     79 y.o.female presented to ED on 10/14/2024 with c/o seizures.  At baseline patient bed bound for previous eight years, resident at [facility].     PMH significant for iron deficiency anemia, HTN, hypothyroidism. Pt presented to ED on 10/17/2024 with AMS and seizure activity via EMS from nursing home. Pt was still seizing when EMS arrived.      In ED workup CT without any acute process. Pt was loaded with 1500 mg IV Keppra. Family arrived in ED and discussed with ED physician, Family elected to opt for comfort care including stopping any future seizures but defer and further diagnostic workup.    Hospital Course:   Hospice was consulted per family request.     Pt is seen and examined at bedside, [facility] hospice RN is present. Pt is laying in bed, non responsive, appears comfortable.     Inpatient hospice team met with both sons at bedside, Tiago Soni is POA.  Inpatient team reviewed inpatient hospice service, medication management, and goals of care.  Family elected comfort focused POC.    Patient admitted to inpatient hospice on 10/17/2024 with terminal diagnosis of Alzheimer's dementia with related Seizures for management of severe pain, anxiety and terminal restlessness that will require injectable medications and frequent skilled nursing assessments to manage.  Prognosis grim.      Review of Systems:  UTO 2/2 acuity    History:  Past Medical History:   Diagnosis Date    Dementia     Hypertension     Mood disorder     Thyroid disease      History reviewed. No pertinent surgical history.  Current Facility-Administered Medications   Medication Dose Route Frequency Provider Last Rate Last Admin    acetaminophen (TYLENOL) tablet 650 mg  650 mg Oral Q4H PRN Hollie Almeida APRN        Or     acetaminophen (TYLENOL) 160 MG/5ML oral solution 650 mg  650 mg Oral Q4H PRN Hollie Almeida, APRN        Or    acetaminophen (TYLENOL) suppository 650 mg  650 mg Rectal Q4H PRN Hollie Almeida, APRN        glycopyrrolate (ROBINUL) injection 0.4 mg  0.4 mg Intravenous Q6H Marcinkevicius, Kerrie, DO   0.4 mg at 10/19/24 0517    levETIRAcetam (KEPPRA) injection 500 mg  500 mg Intravenous Q12H Hollie Almeida, APRN   500 mg at 10/19/24 0808    midazolam (VERSED) injection 1 mg  1 mg Intravenous Q4H PRN Marcinkevicius, Kerrie, DO        morphine concentrated solution 5 mg  5 mg Oral Q3H PRN Marcinkevicius, Kerrie, DO        morphine injection 2 mg  2 mg Intravenous Q2H PRN Marcinkevicius, Kerrie, DO   2 mg at 10/18/24 1740    sodium chloride 0.9 % flush 10 mL  10 mL Intravenous PRN Seven Ferrell MD        sodium chloride 0.9 % flush 10 mL  10 mL Intravenous PRN Seven Ferrell MD        sodium chloride 0.9 % flush 10 mL  10 mL Intravenous Q12H Hollie Almeida, APRN   10 mL at 10/19/24 0808    sodium chloride 0.9 % flush 10 mL  10 mL Intravenous PRN Hollie Almeida, APRN              acetaminophen **OR** acetaminophen **OR** acetaminophen    midazolam    morphine    Morphine    sodium chloride    sodium chloride    sodium chloride  No Known Allergies  Family History   Problem Relation Age of Onset    No Known Problems Mother     No Known Problems Father     Other (Cardiac arrest) Son      Social History     Socioeconomic History    Marital status:    Tobacco Use    Smoking status: Never     Passive exposure: Never    Smokeless tobacco: Never   Vaping Use    Vaping status: Never Used   Substance and Sexual Activity    Alcohol use: Never    Drug use: Never    Sexual activity: Defer       Objective     Vital Signs:  Temp:  [98.1 °F (36.7 °C)-99.3 °F (37.4 °C)] 99.3 °F (37.4 °C)  Heart Rate:  [112-133] 133  Resp:  [18-20] 18  BP: ()/(53-77) 101/77    PPS: 10%    Physical Exam:  Gen: elderly white female laying in bed, ill  appearing  HENT: NC/AT, eyes closed, patent nares, temporal wasting, neck supple  Cardiac: RRR, no M/G/R  Lungs: CTAB, diminished  Abd: hypoactive BS  Skin: warm and dry  Ext: trace edema, bilateral foot drop, contractures of both hands/fingers  Neuro: non responsive  Psych: calm      Results Reviewed:  LAB RESULTS:      Lab 10/17/24  2132 10/17/24  2118   WBC  --  10.16   HEMOGLOBIN  --  7.0*   HEMOGLOBIN, POC 7.1*  --    HEMATOCRIT  --  23.6*   HEMATOCRIT POC 21*  --    PLATELETS  --  251   NEUTROS ABS  --  8.41*   IMMATURE GRANS (ABS)  --  0.06*   LYMPHS ABS  --  0.84   MONOS ABS  --  0.74   EOS ABS  --  0.07   MCV  --  80.5   LACTATE  --  6.9*   APTT  --  29.2         Lab 10/17/24  2132 10/17/24  2118   SODIUM  --  138   POTASSIUM  --  5.2   CHLORIDE  --  101   CO2  --  24.0   ANION GAP  --  13.0   BUN  --  15   CREATININE 1.00 1.00   EGFR 56.7* 56.7*   GLUCOSE  --  120*   CALCIUM  --  8.5*   MAGNESIUM  --  1.8         Lab 10/17/24  2118   TOTAL PROTEIN 6.6   ALBUMIN 2.0*   GLOBULIN 4.6   ALT (SGPT) 6   AST (SGOT) 17   BILIRUBIN 0.3   ALK PHOS 98         Lab 10/17/24  2118   HSTROP T 88*                 Brief Urine Lab Results  (Last result in the past 365 days)        Color   Clarity   Blood   Leuk Est   Nitrite   Protein   CREAT   Urine HCG        06/05/24 1502 Yellow   Cloudy   Trace   Large (3+)   Negative   Negative                   Microbiology Results Abnormal       None              Assessment/Plan:   Alzheimer's Dementia  Status epilepticus  Anemia  Essential hypertension  Hypothyroidism  Hyperlipidemia  Dementia  Acute respiratory failure with hypoxia  Somnolence  Arthritis      -Admit to inpatient hospice service 10/17/2024 with Alzheimer's Dementia with related Seizures.     -Coordination of care with nursing staff and [facility] IDT.     -Pain: Morphine 2 mg IV q 1 PRN    -Dyspnea:  Morphine  as above, oxygen via NC PRN     -Nausea/Vomiting: Zofran 4 mg IV q 6 hr PRN       -Anxiety/Agitation: Versed 1 mg  q 4 hrs PRN       -Bowel/bladder: bisacodyl supp q day PRN     -Nutrition: Comfort diet as tolerated     -ADLs: total care     -Terminal fever: tylenol supp 650 mg q 6 hr PRN. tordal 15 mg IV q 6 hr PRN     -Terminal secretions:  May  PRN robinul 0.4 mg q 4 prn/scop patch q 72 prn      -Patient comfort: palliative oral rinse PRN, eye drops PRN     -Transfer to [facility]     -Goals of Care: achieve comfortable death, educate and support family through this process.           Total Time: 42 minutes  Face to Face Time: 15 minutes  Total time spent includes time reviewing chart, face-to-face time, counseling patient/family/caregiver, ordering medications/tests/procedures, communicating with other health care professionals, documenting clinical information in the electronic health record, and coordination of care with facility staff and [facility] IDT.      Verbal certification obtained from Dr. Cassidy who has determined patient's eligibility with terminal diagnosis of Alzheimer's Dementia with related Seizures.  Medications and diagnosis determined to be related to terminal prognosis. See physician's CTI for information on eligibility determination.      Justification for care:  Patient meets criteria for acute in-patient care with required nursing assessment and interventions for symptoms with IV medications.      Eleanor Loomis, MSN, APRN  [facility]  Hospice and Palliative Care Nurse Practitioner  10/19/24  10:53 EDT

## 2024-10-19 NOTE — PLAN OF CARE
Goal Outcome Evaluation:              Outcome Evaluation: pt comfortable, occassional lt hand twitching, hunt in  place, family at bedside, will continue POC

## 2024-10-19 NOTE — PLAN OF CARE
Goal Outcome Evaluation:  Plan of Care Reviewed With: patient

## 2024-10-19 NOTE — PROGRESS NOTES
Continued Stay Note   [facility]     Patient Name: Mónica Carrillo  MRN: [id number]  Today's Date: 10/19/2024    Admit Date: 10/17/2024    Plan: TBD   Discharge Plan       Row Name 10/19/24 1045       Plan    Plan Comments Hospice spoke with son, Tiago Soni, who relays that he will be here at approximately 0930 but can meet with hospice at any time this am. Hospice RN and MERRITT BOONE visit to room. Patient with relaxed face, respirations, body posture. Warm to the touch. Nonverbal. No purposeful response to stimuli, assess. Lungs CTA, Tachycardic. Rn reassures patient that she is safe and cared for. Plan: Inpatient hospice to meet with patient/family in room this am to discuss inpatient hospice.                   Discharge Codes    No documentation.                       Whit Bolivar RN

## 2024-10-20 NOTE — PROGRESS NOTES
Hospice Progress Note    Patient Name: Mónica Carrillo   : 1945  Gender: female    Code Status: comfort measures    Date of Admission: 10/19/2024    Subjective:  Mónica Carrillo is a 79 y.o. female admitted to inpatient hospice 10/19/2024 with diagnosis of Alzheimer's dementia [G30.9, F02.80]  for symptom management.     Patient is seen and examined at bedside today, hospice RN Whit is present.  Patient's son, his wife and daughter are present in room.  Patient is on Versed drip today at 1 mg an hour, no ongoing signs of seizure activity reported, appears comfortable.  Education done with family regarding Alzheimer's dementia and seizures.  Answered questions to the best of our ability.  Patient is nonresponsive.      - Scheduled:  Robinul 0.4 mg every 6  Keppra 500 mg IV every 12  Versed drip 1 mg/h    - PRNs:  Morphine 2 mg IV x 1    - Intake/Output  Intake & Output (last 3 days)         10/17 0701  10/18 0700 10/18 0701  10/19 0700 10/19 0701  10/20 0700 10/20 0701  10/21 07    P.O.   0     Total Intake(mL/kg)   0 (0)     Urine (mL/kg/hr)  500 (0.3) 225 (0.1)     Total Output  500 225     Net  -500 -225                        ROS:  Review of Systems    Reviewed current scheduled and prn medications for route, type, dose and frequency.  Midazolam 1 mg/mL 100mL NS, 1-3 mg/hr, Last Rate: 1 mg/hr (10/19/24 2154)        acetaminophen    bisacodyl    glycopyrrolate    ketorolac    LORazepam    Morphine    ondansetron    palliative care oral rinse    Polyvinyl Alcohol-Povidone PF    Scopolamine    sodium chloride    Objective:   LMP  (LMP Unknown)      PPS: 10%    Physical Exam:  Gen: elderly white female laying in bed, ill appearing  HENT: NC/AT, eyes closed, patent nares, temporal wasting, neck supple  Cardiac: RRR, no M/G/R  Lungs: CTAB, diminished  Abd: hypoactive BS  Skin: warm and dry, more pale today  Ext: trace edema, bilateral foot drop, contractures of both hands/fingers  Neuro: non  responsive  Psych: calm       Assessment/Plan:   Mónica Carrillo is a 79 y.o. female admitted to inpatient hospice 10/19/2024 with diagnosis of Alzheimer's dementia [G30.9, F02.80]  for symptom management.       Symptoms:  Seizure activity-Versed drip and Versed as needed, Keppra twice daily  Pain-IV morphine as needed  Dyspnea-IV morphine  Nausea-Zofran IV  Terminal secretions-Robinul as needed    Justification for care:  Patient meets criteria for acute in-patient care due to need for frequent skilled nursing assessments to determine patient comfort and medication effectiveness at end of life.  Frequent adjustments to medications and interventions for symptom management, including injectable medications.      Eleanor Loomis, MSN, APRN  [facility] Care Navigators  Hospice and Palliative Care Nurse Practitioner  10/20/24  13:02 EDT

## 2024-10-20 NOTE — PROGRESS NOTES
Continued Stay Note  [facility]     Patient Name: Mónica Carrillo  MRN: [id number]  Today's Date: 10/20/2024    Admit Date: 10/19/2024    Plan: Inpatient hospice   Discharge Plan       Row Name 10/20/24 1918       Plan    Plan Inpatient hospice    Plan Comments CC 10% pps is a 80yo female with terminal diagnosis of Alzheimer's Dementia. Chart reviewed (LBM 10/18/24, PRN's: morphine x1), visit to bedside with MERRITT BOONE, assess completed. Patient's son from [place], daughter in law, and daughter at bedside. [place] and supportive of patient. Rn and APRN actively listen as they relay goal of comfort for their mother. Patient observed unresponsive, eyes closed, relaxed face, jaw, body posture, respirations. No tremors or seizure activity noted or reported since versed infusion placed last night. Toes are cool today and patient is observed with increased pallor. Discussed assess, condition, changes in assess, decline, what to possibly expect from this point forward, eol communication, eol signs/symptoms, medications, and self care. Ongoing support and open communication provided. Family verbalized understanding. Care coordinated with Pablo AMAYA [facility]. Patient continues to require injectable medications for symptom palliation necessitating skilled nursing care.    Final Discharge Disposition Code 51 - hospice medical facility                   Discharge Codes    No documentation.                       Whit Bolivar RN

## 2024-10-20 NOTE — PLAN OF CARE
Goal Outcome Evaluation:              Outcome Evaluation: pt comfortable all shift, no prn meds given, family at bedside all shift, hunt in place, will continue POC

## 2024-10-20 NOTE — PLAN OF CARE
Goal Outcome Evaluation:  Plan of Care Reviewed With: patient        Progress: declining  Outcome Evaluation: Pt rested well throughout shift. RN witness two episodes of seizure activity. PRN seizure medications administered. Versed drip initiated to manage seizure activity. Versed drip effective. Lemus in place and IVs patent. Family @ bedside. No other concerns noted at this time.

## 2024-10-21 NOTE — PROGRESS NOTES
Continued Stay Note  [facility]     Patient Name: Mónica Carrillo  MRN: [id number]  Today's Date: 10/21/2024    Admit Date: 10/19/2024    Plan: Inpatient hospice   Discharge Plan       Row Name 10/21/24 0910       Plan    Plan Inpatient hospice    Plan Comments 10% PPS. Patient resting peacefully at this time. Face and body posture relaxed. Breathing even and unlabored. Undisturbed by assessment. She required 1 prn of Morphine overnight. Son, OJ and daughter-in-law at bedside. Patient continues to require inpatient hospice for skilled nursing assessment, medication titration, and injectable medication administration for palliation of pain, anxiety, and seizure management.                   Discharge Codes    No documentation.                       Michell Galindo RN

## 2024-10-21 NOTE — PLAN OF CARE
Goal Outcome Evaluation:  Plan of Care Reviewed With: patient        Progress: declining  Outcome Evaluation: Pt rested well throughout shift x1 PRN pain med indicated. Comfort measures maintained on 2LNC. Family @ bedside. No other concerns noted at this time.

## 2024-10-21 NOTE — PROGRESS NOTES
Hospice Progress Note    Patient Name: Mónica Carrillo   : 1945  Gender: female    Code Status: comfort measures    Date of Admission: 10/19/2024    Subjective:  Mónica Carrillo is a 79 y.o. female admitted to inpatient hospice 10/19/2024 with diagnosis of Alzheimer's dementia [G30.9, F02.80]  for symptom management.     Multiple family members at bedside.  No s/s of seizure activity noted.  Pt comfortable on exam.      - Scheduled:  Robinul 0.4 mg q 6 hrs   Keppra 500 mg BID   Versed 1 mg/hr cont     - PRNs:  Morphine 2 mg x 1       - Intake/Output  Intake & Output (last 3 days)         10/18 0701  10/19 0700 10/19 0701  10/20 0700 10/20 0701  10/21 0700 10/21 0701  10/22 07    P.O.  0  0    Total Intake(mL/kg)  0 (0)  0 (0)    Urine (mL/kg/hr) 500 (0.3) 225 (0.1) 200 (0.1) 25 (0.1)    Total Output 500 225 200 25    Net -500 -225 -200 -25                       ROS:  Review of Systems   Unable to perform ROS: Acuity of condition       Reviewed current scheduled and prn medications for route, type, dose and frequency.  Midazolam 1 mg/mL 100mL NS, 1-3 mg/hr, Last Rate: 1 mg/hr (10/19/24 2154)        acetaminophen    bisacodyl    glycopyrrolate    ketorolac    LORazepam    Morphine    ondansetron    palliative care oral rinse    Polyvinyl Alcohol-Povidone PF    Scopolamine    sodium chloride    Objective:   LMP  (LMP Unknown)      PPS: Palliative Performance Scale score as of 10/28/2024, 15:36 EDT is 10% based on the following measures:   Ambulation: Totally bed bound  Activity and Evidence of Disease: Unable to do any work, extensive evidence of disease  Self-Care: Total care  Intake:  Mouth care only  LOC: Drowsy or coma     Physical Exam:  Physical Exam  Constitutional:       General: She is not in acute distress.     Appearance: She is ill-appearing.   HENT:      Head: Normocephalic.      Mouth/Throat:      Mouth: Mucous membranes are moist.   Cardiovascular:      Rate and Rhythm: Tachycardia present.  Rhythm irregular.   Pulmonary:      Effort: Pulmonary effort is normal.      Breath sounds: No rales.   Abdominal:      General: Bowel sounds are normal. There is no distension.   Skin:     General: Skin is warm.             Alzheimer's dementia      Assessment/Plan:   Mónica Carrillo is a 79 y.o. female admitted to inpatient hospice 10/19/2024 with diagnosis of Alzheimer's dementia [G30.9, F02.80]  for symptom management.     Symptoms:  Pain   Anxiety   Seizures     Discharge Disposition: [facility]     Pain   -Morphine 2 mg q 1 hr PRN     Anxiety   -Versed PRN     Seizures   -Versed infusion at 1 mg/hr cont     Total Visit Time: 20 min   Face to Face Time: 10 min     Justification for care:  Patient meets criteria for acute in-patient care due to need for frequent skilled nursing assessments to determine patient comfort and medication effectiveness at end of life.  Frequent adjustments to medications and interventions for symptom management, including injectable medications.      Vangie Soni, MSN, APRN  [facility] Nav[facility]ators  Hospice and Palliative Care Nurse Practitioner  10/21/24  12:56 EDT

## 2024-10-21 NOTE — PLAN OF CARE
Goal Outcome Evaluation:  Plan of Care Reviewed With: patient, family           Outcome Evaluation: Pt has rested throughout shift with no PRN medications required. IV infusion of versed continues. Comfort measures continue with Q4 hr turns, oxygen on 2L NC, and heels elevated. Continuing POC and reporting as needed.

## 2024-10-22 NOTE — PROGRESS NOTES
Hospice Progress Note    Patient Name: Mónica Carrillo   : 1945  Gender: female    Code Status: comfort measures    Date of Admission: 10/19/2024    Subjective:  Mónica Carrillo is a 79 y.o. female admitted to inpatient hospice 10/19/2024 with diagnosis of Alzheimer's dementia [G30.9, F02.80]  for symptom management.     No family present at bedside.  No s/s of seizure activity noted.  Pt comfortable on exam.  Unknown last BM.     - Scheduled:  Robinul 0.4 mg q 6 hrs   Keppra 500 mg BID   Versed 1 mg/hr cont   Palliative oral rinse q 6 hrs     - PRNs:  Morphine 2 mg x 1       - Intake/Output  Intake & Output (last 3 days)         10/18 0701  10/19 0700 10/19 0701  10/20 0700 10/20 0701  10/21 0700 10/21 0701  10/22 07    P.O.  0  0    Total Intake(mL/kg)  0 (0)  0 (0)    Urine (mL/kg/hr) 500 (0.3) 225 (0.1) 200 (0.1) 25 (0.1)    Total Output 500 225 200 25    Net -500 -225 -200 -25                       ROS:  Review of Systems   Unable to perform ROS: Acuity of condition       Reviewed current scheduled and prn medications for route, type, dose and frequency.  No current facility-administered medications for this encounter.        Objective:   LMP  (LMP Unknown)      PPS: Palliative Performance Scale score as of 10/28/2024, 15:39 EDT is 10% based on the following measures:   Ambulation: Totally bed bound  Activity and Evidence of Disease: Unable to do any work, extensive evidence of disease  Self-Care: Total care  Intake:  Mouth care only  LOC: Drowsy or coma     Physical Exam:  Physical Exam  Constitutional:       General: She is not in acute distress.     Appearance: She is ill-appearing.   HENT:      Head: Normocephalic.      Mouth/Throat:      Mouth: Mucous membranes are dry.      Pharynx: Oropharyngeal exudate present.      Comments: Halitosis noted  Cardiovascular:      Rate and Rhythm: Tachycardia present. Rhythm irregular.   Pulmonary:      Effort: Pulmonary effort is normal.      Breath sounds:  No rales.   Abdominal:      General: Bowel sounds are normal. There is no distension.   Skin:     General: Skin is warm.             Alzheimer's dementia      Assessment/Plan:   Mónica Carrillo is a 79 y.o. female admitted to inpatient hospice 10/19/2024 with diagnosis of Alzheimer's dementia [G30.9, F02.80]  for symptom management.     Symptoms:  Pain   Anxiety   Seizures   Constipation     Discharge Disposition: [facility]     Pain   -Morphine 2 mg q 1 hr PRN     Anxiety   -Versed PRN     Seizures   -Versed infusion at 1 mg/hr cont     4. Constipation   -Last BM unknown   -Dulcolax suppository today     Total Visit Time: 20 min   Face to Face Time: 10 min     Justification for care:  Patient meets criteria for acute in-patient care due to need for frequent skilled nursing assessments to determine patient comfort and medication effectiveness at end of life.  Frequent adjustments to medications and interventions for symptom management, including injectable medications.      Vangie Soni, MSN, APRN  [facility]  Hospice and Palliative Care Nurse Practitioner  10/28/24  15:39 EDT

## 2024-10-22 NOTE — PROGRESS NOTES
Continued Stay Note   [place]     Patient Name: Mónica Carrillo  MRN: [id number]  Today's Date: 10/22/2024    Admit Date: 10/19/2024    Plan: Inpatient hospice   Discharge Plan       Row Name 10/22/24 2772       Plan    Plan Inpatient hospice    Plan Comments CC 10% pps is a 78yo female with terminal diagnosis of Alzheimer's dementia. Chart reviewed, visit to bedside, assess completed. Versed infusion intact at 1mg/hr. Patient observed unresponsive. Respirations even, unlabored with relaxed face, jaw, body posture. Warm to the touch. Rn reassures patient that she is safe and cared for. Care coordinated with Ellie AMA[facility]. Updated V. Zachariah JONESN. Patient continues to require injectable medications for symptom palliation necessitating skilled nursing care.    Final Discharge Disposition Code 51 - hospice medical facility                   Discharge Codes    No documentation.                       Whit Bolivar RN

## 2024-10-22 NOTE — PLAN OF CARE
Goal Outcome Evaluation:           Progress: (P) no change  Outcome Evaluation: (P) Pt resting throughout the night. No PRN medication given. IV patent and infusing versed 1mg/hr. FC patent and draining madelin colored urine. 2L NC for comfort. No concerns at this time. Will continue with plan of care.

## 2024-10-22 NOTE — PLAN OF CARE
Goal Outcome Evaluation:      Patient on versed drip. Small movements at times O2 at 2 liters n/c, Lemus Cath draining dark yellow urine. Edema noted to bilateral extremities. Foot drop noted. Will continue to monitor

## 2024-10-23 NOTE — SIGNIFICANT NOTE
Exam confirms with auscultation zero audible heart tones and zero audible respirations. Ms.Carolyn Carrillo was pronounced dead at 1647.  MD notified by Patient's RN.    Vinicio Serrano RN  Clinical House Supervisor  10/23/2024 18:07 EDT

## 2024-10-23 NOTE — PLAN OF CARE
Goal Outcome Evaluation:  Plan of Care Reviewed With: patient        Progress: declining  Outcome Evaluation: On 2L NC. Patient unresponsive to external stimuli. Versed gtt remains unchanged at 1 mg/hr. PRN morphine given x1 for dyspnea. Scopolamine patch placed for excess secretions. Minimal UOP from urinary catheter. No BM. Comfort measures continued.

## 2024-10-23 NOTE — NURSING NOTE
Patient without audible breath sounds or heart beat. No pulse noted. Time of passing is 4:47 pm. Daughter in room.IV removed, Lemus Cath removed. House notified, POA son notified.  home notified.

## 2024-10-23 NOTE — PROGRESS NOTES
Hospice level of care is [facility].  Chart reviewed, medications noted.  Clinical status and needs discussed with Hospice RN.  Orders placed as necessary.    Vangie Soni, MSN, APRN, [facility]  [facility]  Hospice and Palliative Care Nurse Practitioner  10/23/24  14:11 EDT    9

## 2024-10-23 NOTE — PROGRESS NOTES
Continued Stay Note  [facility]     Patient Name: Mónica Carrillo  MRN: [id number]  Today's Date: 10/23/2024    Admit Date: 10/19/2024    Plan: Inpatient hospice   Discharge Plan       Row Name 10/23/24 0925       Plan    Plan Inpatient hospice    Plan Comments 10% PPS. Patient resting with eyes closed. Noted with grunting respirations and moan. Asked nurse to give prn Morphine. Patient undisturbed by assessment. She required 1 prn of Morphine overnight. Called son, Tiago with an update. Spoke with his spouse, Fatmata. Provided hospice number and asked Fatmata to have Ta[facility] call if he had any needs. Patient is changed to routine home care status effective today, 10/23/2024. She will continue under the care of the inpatient hospice team.                   Discharge Codes    No documentation.                       Michell Galindo RN

## 2024-10-24 NOTE — DISCHARGE SUMMARY
Date of Admission: 10/19/2024   Date and Time of Death: 10/23/2024 at 4:47 PM    Hospital Course:  Mónica Carrillo is a 79 y.o. female admitted to inpatient hospice with diagnosis of Alzheimer's dementia [G30.9, F02.80]  for symptom management. Medications were available throughout admission for symptom management and comfort. Patient continued to decline after admission as expected ultimately to their death on 10/23/2024 at 4:47 PM. Patient was pronounced dead by Clinical House Supervisor. Spiritual and psychosocial support were available to patient and family throughout admission. Patient's remains were released per [facility] protocol.       Vangie Soni, MSN, APRN, ACHPN  [facility]  Hospice and Palliative Care Nurse Practitioner  10/24/24  09:13 EDT